# Patient Record
Sex: FEMALE | Race: WHITE | Employment: OTHER | URBAN - METROPOLITAN AREA
[De-identification: names, ages, dates, MRNs, and addresses within clinical notes are randomized per-mention and may not be internally consistent; named-entity substitution may affect disease eponyms.]

---

## 2018-01-18 ENCOUNTER — OFFICE VISIT (OUTPATIENT)
Dept: ENDOCRINOLOGY | Age: 53
End: 2018-01-18

## 2018-01-18 VITALS
HEIGHT: 63 IN | RESPIRATION RATE: 16 BRPM | TEMPERATURE: 98.4 F | DIASTOLIC BLOOD PRESSURE: 87 MMHG | WEIGHT: 177.8 LBS | SYSTOLIC BLOOD PRESSURE: 129 MMHG | HEART RATE: 86 BPM | OXYGEN SATURATION: 98 % | BODY MASS INDEX: 31.5 KG/M2

## 2018-01-18 DIAGNOSIS — E04.1 THYROID NODULE: Primary | ICD-10-CM

## 2018-01-18 DIAGNOSIS — E55.9 VITAMIN D DEFICIENCY: ICD-10-CM

## 2018-01-18 DIAGNOSIS — E03.9 ACQUIRED HYPOTHYROIDISM: ICD-10-CM

## 2018-01-18 DIAGNOSIS — E04.1 THYROID NODULE: ICD-10-CM

## 2018-01-18 LAB
A/G RATIO: 2.3 (ref 1.1–2.2)
ALBUMIN SERPL-MCNC: 4.9 G/DL (ref 3.4–5)
ALP BLD-CCNC: 58 U/L (ref 40–129)
ALT SERPL-CCNC: 16 U/L (ref 10–40)
ANION GAP SERPL CALCULATED.3IONS-SCNC: 13 MMOL/L (ref 3–16)
AST SERPL-CCNC: 16 U/L (ref 15–37)
BILIRUB SERPL-MCNC: 0.4 MG/DL (ref 0–1)
BUN BLDV-MCNC: 10 MG/DL (ref 7–20)
CALCIUM SERPL-MCNC: 9.9 MG/DL (ref 8.3–10.6)
CHLORIDE BLD-SCNC: 101 MMOL/L (ref 99–110)
CO2: 27 MMOL/L (ref 21–32)
CREAT SERPL-MCNC: 0.7 MG/DL (ref 0.6–1.1)
GFR AFRICAN AMERICAN: >60
GFR NON-AFRICAN AMERICAN: >60
GLOBULIN: 2.1 G/DL
GLUCOSE BLD-MCNC: 96 MG/DL (ref 70–99)
POTASSIUM SERPL-SCNC: 4.7 MMOL/L (ref 3.5–5.1)
SODIUM BLD-SCNC: 141 MMOL/L (ref 136–145)
T4 FREE: 1.3 NG/DL (ref 0.9–1.8)
TOTAL PROTEIN: 7 G/DL (ref 6.4–8.2)
TSH SERPL DL<=0.05 MIU/L-ACNC: 2.81 UIU/ML (ref 0.27–4.2)

## 2018-01-18 PROCEDURE — 99204 OFFICE O/P NEW MOD 45 MIN: CPT | Performed by: INTERNAL MEDICINE

## 2018-01-18 RX ORDER — DULOXETIN HYDROCHLORIDE 20 MG/1
20 CAPSULE, DELAYED RELEASE ORAL DAILY
COMMUNITY
End: 2018-04-19 | Stop reason: SDUPTHER

## 2018-01-18 RX ORDER — LEVOTHYROXINE SODIUM 0.05 MG/1
50 TABLET ORAL DAILY
COMMUNITY
End: 2018-04-19 | Stop reason: SDUPTHER

## 2018-01-18 RX ORDER — IBUPROFEN 200 MG
600 TABLET ORAL DAILY
COMMUNITY
End: 2019-05-22 | Stop reason: ALTCHOICE

## 2018-04-19 ENCOUNTER — TELEPHONE (OUTPATIENT)
Dept: ENDOCRINOLOGY | Age: 53
End: 2018-04-19

## 2018-04-19 RX ORDER — LEVOTHYROXINE SODIUM 0.05 MG/1
50 TABLET ORAL DAILY
Qty: 90 TABLET | Refills: 1 | Status: SHIPPED | OUTPATIENT
Start: 2018-04-19 | End: 2018-08-10 | Stop reason: SDUPTHER

## 2018-04-19 RX ORDER — DULOXETIN HYDROCHLORIDE 20 MG/1
20 CAPSULE, DELAYED RELEASE ORAL DAILY
Qty: 90 CAPSULE | Refills: 0 | Status: SHIPPED | OUTPATIENT
Start: 2018-04-19 | End: 2018-08-10 | Stop reason: SDUPTHER

## 2018-05-24 ENCOUNTER — OFFICE VISIT (OUTPATIENT)
Dept: ENDOCRINOLOGY | Age: 53
End: 2018-05-24

## 2018-05-24 VITALS
HEART RATE: 91 BPM | WEIGHT: 179.8 LBS | BODY MASS INDEX: 28.22 KG/M2 | OXYGEN SATURATION: 99 % | DIASTOLIC BLOOD PRESSURE: 83 MMHG | HEIGHT: 67 IN | RESPIRATION RATE: 18 BRPM | SYSTOLIC BLOOD PRESSURE: 120 MMHG

## 2018-05-24 DIAGNOSIS — E03.9 ACQUIRED HYPOTHYROIDISM: ICD-10-CM

## 2018-05-24 DIAGNOSIS — E55.9 VITAMIN D DEFICIENCY: ICD-10-CM

## 2018-05-24 DIAGNOSIS — E03.9 ACQUIRED HYPOTHYROIDISM: Primary | ICD-10-CM

## 2018-05-24 DIAGNOSIS — E04.1 THYROID NODULE: ICD-10-CM

## 2018-05-24 PROCEDURE — 99213 OFFICE O/P EST LOW 20 MIN: CPT | Performed by: INTERNAL MEDICINE

## 2018-05-24 ASSESSMENT — ENCOUNTER SYMPTOMS
BACK PAIN: 0
PHOTOPHOBIA: 0
HEMOPTYSIS: 0
DOUBLE VISION: 0
BLURRED VISION: 0
COUGH: 0
ORTHOPNEA: 0

## 2018-05-25 LAB
T4 FREE: 1.5 NG/DL (ref 0.9–1.8)
TSH SERPL DL<=0.05 MIU/L-ACNC: 3.34 UIU/ML (ref 0.27–4.2)

## 2018-08-10 RX ORDER — LEVOTHYROXINE SODIUM 0.05 MG/1
50 TABLET ORAL DAILY
Qty: 90 TABLET | Refills: 1 | Status: SHIPPED | OUTPATIENT
Start: 2018-08-10 | End: 2018-11-06 | Stop reason: SDUPTHER

## 2018-08-10 RX ORDER — DULOXETIN HYDROCHLORIDE 20 MG/1
20 CAPSULE, DELAYED RELEASE ORAL DAILY
Qty: 90 CAPSULE | Refills: 0 | Status: SHIPPED | OUTPATIENT
Start: 2018-08-10 | End: 2018-11-06 | Stop reason: SDUPTHER

## 2018-11-06 RX ORDER — DULOXETIN HYDROCHLORIDE 20 MG/1
20 CAPSULE, DELAYED RELEASE ORAL DAILY
Qty: 90 CAPSULE | Refills: 1 | Status: SHIPPED | OUTPATIENT
Start: 2018-11-06 | End: 2019-05-22

## 2018-11-06 RX ORDER — LEVOTHYROXINE SODIUM 0.05 MG/1
50 TABLET ORAL DAILY
Qty: 90 TABLET | Refills: 1 | Status: SHIPPED | OUTPATIENT
Start: 2018-11-06 | End: 2019-05-22

## 2018-11-29 ENCOUNTER — OFFICE VISIT (OUTPATIENT)
Dept: ENDOCRINOLOGY | Age: 53
End: 2018-11-29
Payer: COMMERCIAL

## 2018-11-29 VITALS
BODY MASS INDEX: 29.19 KG/M2 | WEIGHT: 186 LBS | HEIGHT: 67 IN | RESPIRATION RATE: 18 BRPM | DIASTOLIC BLOOD PRESSURE: 81 MMHG | OXYGEN SATURATION: 99 % | HEART RATE: 93 BPM | SYSTOLIC BLOOD PRESSURE: 125 MMHG

## 2018-11-29 DIAGNOSIS — E03.9 ACQUIRED HYPOTHYROIDISM: ICD-10-CM

## 2018-11-29 DIAGNOSIS — E04.1 THYROID NODULE: ICD-10-CM

## 2018-11-29 DIAGNOSIS — E55.9 VITAMIN D DEFICIENCY: ICD-10-CM

## 2018-11-29 DIAGNOSIS — E03.9 ACQUIRED HYPOTHYROIDISM: Primary | ICD-10-CM

## 2018-11-29 LAB
T4 FREE: 1.2 NG/DL (ref 0.9–1.8)
TSH SERPL DL<=0.05 MIU/L-ACNC: 1.85 UIU/ML (ref 0.27–4.2)
VITAMIN D 25-HYDROXY: 68.7 NG/ML

## 2018-11-29 PROCEDURE — 99214 OFFICE O/P EST MOD 30 MIN: CPT | Performed by: INTERNAL MEDICINE

## 2018-11-29 ASSESSMENT — ENCOUNTER SYMPTOMS
COUGH: 0
PHOTOPHOBIA: 0
HEMOPTYSIS: 0
DOUBLE VISION: 0
ORTHOPNEA: 0
BLURRED VISION: 0
BACK PAIN: 0

## 2018-11-29 NOTE — PROGRESS NOTES
She is oriented to person, place, and time. She appears well-developed. No distress. HENT:   Mouth/Throat: Oropharynx is clear and moist.   Eyes: EOM are normal.   Neck: No thyromegaly present. Cardiovascular: Normal rate and normal heart sounds. Pulmonary/Chest: Effort normal. No respiratory distress. She has no wheezes. Abdominal: Soft. Bowel sounds are normal. There is no tenderness. Musculoskeletal: She exhibits no edema. Neurological: She is alert and oriented to person, place, and time. Skin: Skin is warm and dry. She is not diaphoretic. Psychiatric: Her behavior is normal. Thought content normal.     EXAM: US-THYROID/NECK/HEAD 11/17    INDICATION: Nontoxic single thyroid nodule    COMPARISON: September 16, 2016    TECHNIQUE: Multiplanar grayscale analysis of the thyroid was performed. FINDINGS:    The right lobe measures 4.8 x 1.1 x 1.9 cm. The left lobe measures 4.1 x 0.9 x 1.5 cm. The isthmus measures 4 mm in anteroposterior dimension. The thyroid gland appears normal in size, contour, and echogenicity  bilaterally. There are unchanged tiny 3 mm nodules within both thyroid  Lobes. IMPRESSION:    Stable tiny thyroid nodules almost certainly benign. No further  follow-up is indicated. Assessment/Plan    1. Hypothyroidism    This 48 yrs old female has hypothyroidism. She is currently on levothyroxine 50 mcg daily. Levels were normal in 01/18 and 05/18  She has gained weight. Will repeat her labs. 2. Thyroid nodules. US in 11/17 showed small 3 mm nodules in both lobes. No change since initial US in 03/16    No further follow-up US recommended by radiologist given small size and benign appearance of the nodules. Normal thyroid on exam    Will continue to monitor with serial exams       3. Vitamin D def. On replacement. Vitamin D normal in 11/17. Results via my chart.

## 2019-05-22 ENCOUNTER — OFFICE VISIT (OUTPATIENT)
Dept: FAMILY MEDICINE CLINIC | Age: 54
End: 2019-05-22
Payer: COMMERCIAL

## 2019-05-22 VITALS
OXYGEN SATURATION: 96 % | SYSTOLIC BLOOD PRESSURE: 126 MMHG | DIASTOLIC BLOOD PRESSURE: 82 MMHG | BODY MASS INDEX: 28.61 KG/M2 | HEART RATE: 90 BPM | WEIGHT: 178 LBS | HEIGHT: 66 IN

## 2019-05-22 DIAGNOSIS — Z00.00 HEALTHCARE MAINTENANCE: ICD-10-CM

## 2019-05-22 DIAGNOSIS — K21.9 GASTROESOPHAGEAL REFLUX DISEASE, ESOPHAGITIS PRESENCE NOT SPECIFIED: ICD-10-CM

## 2019-05-22 DIAGNOSIS — R19.5 POSITIVE COLORECTAL CANCER SCREENING USING COLOGUARD TEST: ICD-10-CM

## 2019-05-22 DIAGNOSIS — E04.1 THYROID NODULE: ICD-10-CM

## 2019-05-22 DIAGNOSIS — R19.7 DIARRHEA, UNSPECIFIED TYPE: ICD-10-CM

## 2019-05-22 DIAGNOSIS — J30.89 NON-SEASONAL ALLERGIC RHINITIS, UNSPECIFIED TRIGGER: ICD-10-CM

## 2019-05-22 DIAGNOSIS — E03.9 ACQUIRED HYPOTHYROIDISM: ICD-10-CM

## 2019-05-22 DIAGNOSIS — Z76.89 ENCOUNTER TO ESTABLISH CARE: ICD-10-CM

## 2019-05-22 DIAGNOSIS — Z76.89 ENCOUNTER TO ESTABLISH CARE: Primary | ICD-10-CM

## 2019-05-22 LAB
A/G RATIO: 2 (ref 1.1–2.2)
ALBUMIN SERPL-MCNC: 5.1 G/DL (ref 3.4–5)
ALP BLD-CCNC: 58 U/L (ref 40–129)
ALT SERPL-CCNC: 14 U/L (ref 10–40)
ANION GAP SERPL CALCULATED.3IONS-SCNC: 20 MMOL/L (ref 3–16)
AST SERPL-CCNC: 12 U/L (ref 15–37)
BASOPHILS ABSOLUTE: 0.1 K/UL (ref 0–0.2)
BASOPHILS RELATIVE PERCENT: 0.7 %
BILIRUB SERPL-MCNC: <0.2 MG/DL (ref 0–1)
BUN BLDV-MCNC: 9 MG/DL (ref 7–20)
CALCIUM SERPL-MCNC: 10.5 MG/DL (ref 8.3–10.6)
CHLORIDE BLD-SCNC: 107 MMOL/L (ref 99–110)
CHOLESTEROL, TOTAL: 187 MG/DL (ref 0–199)
CO2: 20 MMOL/L (ref 21–32)
CREAT SERPL-MCNC: 0.8 MG/DL (ref 0.6–1.1)
EOSINOPHILS ABSOLUTE: 0.1 K/UL (ref 0–0.6)
EOSINOPHILS RELATIVE PERCENT: 0.9 %
GFR AFRICAN AMERICAN: >60
GFR NON-AFRICAN AMERICAN: >60
GLOBULIN: 2.6 G/DL
GLUCOSE BLD-MCNC: 114 MG/DL (ref 70–99)
HCT VFR BLD CALC: 42.9 % (ref 36–48)
HDLC SERPL-MCNC: 52 MG/DL (ref 40–60)
HEMOGLOBIN: 14.3 G/DL (ref 12–16)
LDL CHOLESTEROL CALCULATED: 97 MG/DL
LYMPHOCYTES ABSOLUTE: 1.7 K/UL (ref 1–5.1)
LYMPHOCYTES RELATIVE PERCENT: 18.9 %
MCH RBC QN AUTO: 30.7 PG (ref 26–34)
MCHC RBC AUTO-ENTMCNC: 33.3 G/DL (ref 31–36)
MCV RBC AUTO: 92.1 FL (ref 80–100)
MONOCYTES ABSOLUTE: 0.4 K/UL (ref 0–1.3)
MONOCYTES RELATIVE PERCENT: 4.8 %
NEUTROPHILS ABSOLUTE: 6.9 K/UL (ref 1.7–7.7)
NEUTROPHILS RELATIVE PERCENT: 74.7 %
PDW BLD-RTO: 13.3 % (ref 12.4–15.4)
PLATELET # BLD: 396 K/UL (ref 135–450)
PMV BLD AUTO: 7.5 FL (ref 5–10.5)
POTASSIUM SERPL-SCNC: 4.4 MMOL/L (ref 3.5–5.1)
RBC # BLD: 4.66 M/UL (ref 4–5.2)
SODIUM BLD-SCNC: 147 MMOL/L (ref 136–145)
T4 FREE: 1.3 NG/DL (ref 0.9–1.8)
TOTAL PROTEIN: 7.7 G/DL (ref 6.4–8.2)
TRIGL SERPL-MCNC: 189 MG/DL (ref 0–150)
TSH REFLEX: 4.55 UIU/ML (ref 0.27–4.2)
VITAMIN D 25-HYDROXY: 50.6 NG/ML
VLDLC SERPL CALC-MCNC: 38 MG/DL
WBC # BLD: 9.2 K/UL (ref 4–11)

## 2019-05-22 PROCEDURE — 99204 OFFICE O/P NEW MOD 45 MIN: CPT | Performed by: FAMILY MEDICINE

## 2019-05-22 ASSESSMENT — ENCOUNTER SYMPTOMS
BACK PAIN: 0
ABDOMINAL PAIN: 0
COUGH: 0
VOMITING: 0
NAUSEA: 0
CONSTIPATION: 0
BLOOD IN STOOL: 0
TROUBLE SWALLOWING: 0
COLOR CHANGE: 0
DIARRHEA: 1
SINUS PRESSURE: 1
SHORTNESS OF BREATH: 0

## 2019-05-22 NOTE — PROGRESS NOTES
Riley Mata  YOB: 1965    Date of Service:  5/22/2019    Chief Complaint:   Riley Mata is a 48 y.o. female who presents for new patient physical examination and to discuss her medications and thyroid problems. HPI:      Stopped taking her medications altogether last month. Was tired of being tired.      LMP: recently   Every 29 days  Having menopausal sxs    Was on Cymbalta due to multiple close family losses in 3 years  Was fake laughing and knew she wasn't in a good place  Stopped cold turkey while her  was out of town  Felt better after day 3    PPI messed her stomach up   Stopped her PPI after 1-2 days  Is drinking Baking soda water and feels better     Has Pop every once in a while  No coffee  Alcohol intermittently     Still having diarrhea  Cut back on junk and eating more fiber  No blood     No hemoptysis, hematemesis, melena, untinentional WL, dysphagia    Mammogram with [address]  Normal   Cologuard last year, +     Dr. Damián Tapia, Gyn  Saw on the 20th   Stopped her medications then     No problems with falling or staying asleep   Massage every 2 weeks   Working with a chiropractor, took an X-ray, stated her C1/2 were out of wack     Honey locust and honey suckle  Allergic rhinitis is worse   [employer] Readings from Last 3 Encounters:   05/22/19 178 lb (80.7 kg)   11/29/18 186 lb (84.4 kg)   05/24/18 179 lb 12.8 oz (81.6 kg)     BP Readings from Last 3 Encounters:   05/22/19 126/82   11/29/18 125/81   05/24/18 120/83       Patient Active Problem List   Diagnosis    PMDD (premenstrual dysphoric disorder)    Non-seasonal allergic rhinitis    Thoracic sprain and strain    Thyroid nodule    Acquired hypothyroidism    Positive colorectal cancer screening using Cologuard test    Diarrhea    Gastroesophageal reflux disease       Health Maintenance   Topic Date Due    HIV screen  10/23/1982    DTaP/Tdap/Td vaccine (1 - Tdap) 10/23/1986    Breast cancer screen  10/23/2017    Shingles Vaccine (1 of 2) 10/23/2017    Colon cancer screen colonoscopy  10/23/2017    Flu vaccine (Season Ended) 2019    TSH testing  2019    Cervical cancer screen  03/15/2021    Lipid screen  2021    Pneumococcal 0-64 years Vaccine  Aged Out         There is no immunization history on file for this patient. Allergies   Allergen Reactions    Morphine     Tetracyclines & Related      No current outpatient medications on file. No current facility-administered medications for this visit. No past medical history on file. Past Surgical History:   Procedure Laterality Date    APPENDECTOMY      WISDOM TOOTH EXTRACTION       Family History   Problem Relation Age of Onset    Diabetes Maternal Grandmother     Diabetes Other         great aunts and uncles    Esophageal Cancer Maternal Grandfather     Lupus Other         maternal great aunt   [facility] COPD Mother     Alcohol Abuse Mother         recovering    Other Mother         Mac Lung dz     Social History     Socioeconomic History    Marital status:      Spouse name: Not on file    Number of children: Not on file    Years of education: Not on file    Highest education level: Not on file   Occupational History    Not on file   Social Needs    Financial resource strain: Not on file    Food insecurity:     Worry: Not on file     Inability: Not on file    Transportation needs:     Medical: Not on file     Non-medical: Not on file   Tobacco Use    Smoking status: Former Smoker     Years: 20.00     Last attempt to quit: 2002     Years since quittin.3    Smokeless tobacco: Never Used   Substance and Sexual Activity    Alcohol use:  Yes     Alcohol/week: 0.0 oz     Comment: 1-2 daily    Drug use: Not on file    Sexual activity: Not on file   Lifestyle    Physical activity:     Days per week: Not on file     Minutes per session: Not on file    Stress: Not on file Relationships    Social connections:     Talks on phone: Not on file     Gets together: Not on file     Attends Scientologist service: Not on file     Active member of club or organization: Not on file     Attends meetings of clubs or organizations: Not on file     Relationship status: Not on file    Intimate partner violence:     Fear of current or ex partner: Not on file     Emotionally abused: Not on file     Physically abused: Not on file     Forced sexual activity: Not on file   Other Topics Concern    Not on file   Social History Narrative    Not on file       Review of Systems:  Review of Systems   Constitutional: Positive for activity change and fatigue. Negative for appetite change, chills, diaphoresis, fever and unexpected weight change. HENT: Positive for congestion, sinus pressure and sneezing. Negative for hearing loss and trouble swallowing. Eyes: Negative for visual disturbance. Respiratory: Negative for cough and shortness of breath. Cardiovascular: Negative for chest pain, palpitations and leg swelling. Gastrointestinal: Positive for diarrhea. Negative for abdominal pain, blood in stool, constipation, nausea and vomiting. Genitourinary: Negative for decreased urine volume, difficulty urinating, dysuria, flank pain, hematuria and urgency. Musculoskeletal: Positive for arthralgias. Negative for back pain. Skin: Negative for color change and rash. Neurological: Negative for dizziness, weakness, light-headedness, numbness and headaches. Psychiatric/Behavioral: Negative for dysphoric mood and sleep disturbance. The patient is not nervous/anxious. Physical Exam:   Vitals:    05/22/19 0804   BP: 126/82   Site: Left Upper Arm   Position: Sitting   Cuff Size: Medium Adult   Pulse: 90   SpO2: 96%   Weight: 178 lb (80.7 kg)   Height: 5' 6\" (1.676 m)     Body mass index is 28.73 kg/m². Physical Exam   Constitutional: She appears well-developed and well-nourished. No distress.

## 2019-05-22 NOTE — PATIENT INSTRUCTIONS
antioxidants. New research shows that fasting helps lower risk of breast cancer, diabetes, inflammation, cardiovascular disease, Alzheimers, and increases longevity. We don't have a lot of information on how much to fast, but even overnight fasts of 13 hours makes a difference. Consider skipping snacks after dinner. Exercise 1 hour a day at least 5 days a week. If you do not currently exercise, start slow by maybe walking 5 minutes out, 5 minutes back. Increase the amount of time you exercise every day by 2 - 5 minutes, as tolerated. Your goal should be to get to 1/2 - 1 hour a day. Exercise will help you control metabolic diseases, maintain independence, and reduce your risk for dementia. Weight training and resistance exercises have been shown to help preserve muscle mass and strength. It is recommended that these be done twice a week. Balance is important to prevent falls. An easy way to improve this is to stand on one leg at a time while you brush your teeth. Gently stretch your joints to maintain flexibilty. And maintain good posture to protect your spine.

## 2019-05-23 LAB
ESTIMATED AVERAGE GLUCOSE: 125.5 MG/DL
HBA1C MFR BLD: 6 %

## 2019-05-24 ENCOUNTER — INITIAL CONSULT (OUTPATIENT)
Dept: GASTROENTEROLOGY | Age: 54
End: 2019-05-24
Payer: COMMERCIAL

## 2019-05-24 VITALS
SYSTOLIC BLOOD PRESSURE: 128 MMHG | WEIGHT: 180 LBS | HEIGHT: 66 IN | DIASTOLIC BLOOD PRESSURE: 74 MMHG | BODY MASS INDEX: 28.93 KG/M2

## 2019-05-24 DIAGNOSIS — R19.5 POSITIVE COLORECTAL CANCER SCREENING USING COLOGUARD TEST: Primary | ICD-10-CM

## 2019-05-24 DIAGNOSIS — R19.7 DIARRHEA, UNSPECIFIED TYPE: ICD-10-CM

## 2019-05-24 LAB — MISCELLANEOUS LAB TEST ORDER: NORMAL

## 2019-05-24 PROCEDURE — 99203 OFFICE O/P NEW LOW 30 MIN: CPT | Performed by: INTERNAL MEDICINE

## 2019-05-24 RX ORDER — POLYETHYLENE GLYCOL 3350 17 G/17G
238 POWDER ORAL ONCE
Qty: 238 G | Refills: 0 | Status: SHIPPED | OUTPATIENT
Start: 2019-05-24 | End: 2019-05-24

## 2019-05-24 NOTE — PROGRESS NOTES
[address]  [address] [facility]  Phone: [phone]   [phone]    CHIEF COMPLAINT     Chief Complaint   Patient presents with    New Patient     Postive colorgaurd r/b Dr. Venus Velarde         HPI     Deepali Felton is a 48 y.o. female who presents for diarrhea, positive Cologuard test. Patient had a positive Cologuard test for colon cancer screening done 3/26/19. She has never had a colonoscopy before. Denies rectal bleeding. She has diarrhea for about 3 months but only has 2 loose daily stools. Her mother and her brother both had diverticulitis and needed surgery for it. No family history of colon cancer. She denies any abdominal pain currently. She had some issues with GERD in the past but none for sometime now. CBC and CMP normal 19. PAST MEDICAL HISTORY   History reviewed. No pertinent past medical history. FAMILY HISTORY     Family History   Problem Relation Age of Onset    Diabetes Maternal Grandmother     Diabetes Other         great aunts and uncles    Esophageal Cancer Maternal Grandfather     Lupus Other         maternal great aunt   [facility] COPD Mother     Alcohol Abuse Mother         recovering    Other Mother         Mac Lung dz     SOCIAL HISTORY     Social History     Socioeconomic History    Marital status:      Spouse name: Not on file    Number of children: Not on file    Years of education: Not on file    Highest education level: Not on file   Occupational History    Not on file   Social Needs    Financial resource strain: Not on file    Food insecurity:     Worry: Not on file     Inability: Not on file    Transportation needs:     Medical: Not on file     Non-medical: Not on file   Tobacco Use    Smoking status: Former Smoker     Years: 20.00     Last attempt to quit: 2002     Years since quittin.3    Smokeless tobacco: Never Used   Substance and Sexual Activity    Alcohol use:  Yes     Alcohol/week: 0.0 oz     Comment: 1-2 daily    Drug use: Never    Sexual activity: Not Currently   Lifestyle    Physical activity:     Days per week: Not on file     Minutes per session: Not on file    Stress: Not on file   Relationships    Social connections:     Talks on phone: Not on file     Gets together: Not on file     Attends Orthodox service: Not on file     Active member of club or organization: Not on file     Attends meetings of clubs or organizations: Not on file     Relationship status: Not on file    Intimate partner violence:     Fear of current or ex partner: Not on file     Emotionally abused: Not on file     Physically abused: Not on file     Forced sexual activity: Not on file   Other Topics Concern    Not on file   Social History Narrative    Not on file     SURGICAL HISTORY     Past Surgical History:   Procedure Laterality Date    APPENDECTOMY      WISDOM TOOTH EXTRACTION       [address]   (This list may include medications prescribed during this encounter as epic can not insert only the list prior to this encounter.)     ALLERGIES     Allergies   Allergen Reactions    Morphine     Tetracyclines & Related      IMMUNIZATIONS     There is no immunization history on file for this patient. REVIEW OF SYSTEMS     Constitutional: denies fever and unexpected weight change. HENT: Negative for ear pain, hearing loss and nosebleeds. Eyes: Negative for pain and visual disturbance. Respiratory: Negative for cough, shortness of breath and wheezing. Cardiovascular: Negative for chest pain, palpitations and leg swelling. Gastrointestinal: see HPI for details. Endocrine: Negative for polydipsia, polyphagia and polyuria. Genitourinary: Negative for difficulty urinating, dysuria, hematuria and urgency. Musculoskeletal: Positive for arthralgias and back pain. Skin: Negative for pallor and rash. Allergic/Immunologic: Negative for environmental allergies and immunocompromised state. Neurological: Negative for seizures, syncope. Hematological: Negative for adenopathy. Does not bruise/bleed easily. Psychiatric/Behavioral: Negative for agitation, confusion, hallucinations. PHYSICAL EXAM   /74 (Site: Left Upper Arm, Position: Sitting, Cuff Size: Small Adult)   Ht 5' 6\" (1.676 m)   Wt 180 lb (81.6 kg)   LMP 05/08/2019   BMI 29.05 kg/m²   Wt Readings from Last 3 Encounters:   05/24/19 180 lb (81.6 kg)   05/22/19 178 lb (80.7 kg)   11/29/18 186 lb (84.4 kg)     Constitutional: AAO3, No acute distress  HEENT: no pallor or icterus. Neck: supple, no adenopathy  Cardiovascular: Normal heart rate, Normal rhythm, No murmurs,. RS: Normal breath sounds, No wheezing,   Abdomen: soft, non tender, not distended, BS+. No hepatosplenomegaly. Extremities:  No edema. Neuro: AAO3, non focal.      FINAL IMPRESSION     Positive Cologuard test - mild diarrhea (2 loose bowel movements), no rectal bleeding. A colonoscopy is indicated to rule out large colon polyps or colon cancer. Procedure discussed with patient in detail including risks and benefits. Anesthesia risks, risk of perforation, bleeding discussed with the patient. Diarrhea is mild and could be functional. Trial of fiber supplementation daily. Reduce lactose containing foods.

## 2019-05-24 NOTE — LETTER
COLONOSCOPY PREP INSTRUCTIONS  MlRALAX SPLIT DOSE   [facility] PHYSICIAN ENDOSCOPY    Your colonoscopy is scheduled on: _6/4/19 @ 11:30am_   __Paulo/Marky_  Arrival Time: __10:30am_   DO NOT EAT OR DRINK (INCLUDING WATER) AFTER: _6:30am after drinking second dose of prep Milagros Coronel Name_[facility] [phone]  [facility]- [phone]    Keep these papers together; REVIEW ALL OF THEM AT LEAST 7 DAYS BEFORE THE PROCEDURE. Please complete all paperwork; including a current list of your medications, to avoid delays in the admission process. The following instructions must be followed in order to ensure your procedure has optimal outcomes. - KEEP YOUR APPOINTMENT. If for any reason, you are unable to keep your appointment, please notify us within 72 hours before your procedure. - You MUST have a responsible adult to drive you, who MUST remain at our facility the ENTIRE time. If not the procedure will be cancelled. You may go by taxi ONLY if you have a responsible adult with you. You may experience light headedness, dizziness etc., therefore you should have a responsible adult remain with you until the morning after your procedure. - Bring your insurance card and 's license. Call your insurance carrier to verify your benefits, and confirm that our facility is in your network, prior to the procedure date to ensure coverage. The facility name is listed as [facility] or [facility] [address]  and the tax ID# is [id number].  - Due to the safety and privacy of our patients, only one visitor is allowed in the recovery area after the procedure. The center will not be responsible for lost valuables so please leave them at home. - Try to avoid seeds (strawberries, oren, and rye) for one week prior to your procedure.   - If you have questions after beginning the prep, call between 8:30 am & 4:30pm you will speak to a nurse or medical assistant, after 4:30pm you will reach the physician on call. - Hydration (body fluid) is the most important aspect of an effective and safe prep. If you are not drinking enough fluid you may experience cramping, nausea and dizziness. - Common side effects of the prep are nausea, bloating and shaking chills. If any of these occur, take a break from the prep (30 minutes to 1 hour) and then restart. If unable to complete after that notify the Physician as your procedure may need to be cancelled. Nausea medication or an alternative prep is sometimes called in.  - Do not leave home after starting the prep. Frequent, liquid stools may begin as soon as 15 minutes after the first bottle, although it could take up to 4 hours or longer for your first bowel movement. - Even if your stools are clear and watery in appearance, TAKE ALL OF THE PREP.  - Using baby wipes and nonprescription ointments, such as Desitin, will help with the irritation caused by frequent loose stools. - Due to unforeseen schedule changes, you may be asked to move your appointment time to an earlier/later time slot. To insure that your prep gives you the best results for your Colonoscopy, Please follow all directions closely. 3-5 days prior to your procedure:  1. Begin a low-fiber diet (no corn, dried beans, tomatoes, nuts, seeds, lettuce). 2. No bran or bulking agents. 3. Stop taking your multivitamin or iron supplements.   4. If you currently take Aggrenox, Dipyridamole, Persantine, Fondaparinux sodium (Arixtra), Dalteparin sodium Klarissa Michaels), Warfarin (Coumadin), Clopidogrel (Plavix), Prasugrel (Effient), Enoxaparin, Lovenox, or Heparin, Cilostazol (Pletal), Rivoroxaban (Xarelto), Desirudin (Iprivask), Cyclopentyltrazolopyrimide (Ticagrelor or Brilinta), Cangrelor (Michelle Six), Dabigatran (Pradaxa), Apixaban (Eliquis), Edoxaban (Savaysa)you should have received instructions regarding if and when to discontinue the medication. If you have not, or do not clearly understand the instructions, please call the office for clarification (number listed above). 5. Drink plenty of fluid. 1 day prior to your procedure:  1. Do not eat any SOLID FOOD, beginning with breakfast drink clear liquids only, which includes: Chicken or Beef Broth, Coffee/tea (without milk or creamer) Gatorade/PowerAde (no red or purple), JeIl-O (no red or purple), All Soda (even dark cola), Sorbet/Popsicles (no red or purple), Water If you take Diabetic medications (insulin/oral medication)-reduce the amount by one half on the morning of prep. You may resume the meds once you begin eating again. You must drink 8oz of liquids every hour to avoid dehydration. 2. If you take Diabetic medications (insulin/oral medication) - reduce the amount by one half on morning of prep. You may resume the meds once you begin eating again. 3. Bowel Cleansing Prep  ** 3:00pm Take 4 dulcolax (bisacodyl) tablets with a full glass of water  ** 5:00pm Mix one bottle of Miralax (polyethlene glycol) (238/255gm) in one bottle of Gatorade (64oz). Shake until dissolved. Drink 8 oz every 15 minutes until you have finished half of the solution. MORNING OF PROCEDURE  1. __6:30AM__ (5 hours prior to the procedure) Drink the remaining portion of the solution 8 oz. every 15 minutes until completely finished, followed by 8 oz of any of the approved liquids. 2. Take your Blood pressure, Heart and Seizure medication the morning of the procedure with sips of water. 3. Bring inhalers with you. 4. Do not take your Diabetic medication the morning of procedure. 5. You must have a  stay with you during the entire procedure. Dear Patient,      Brandie Molina will receive a call from the [facility] pre-registration department prior to your GI procedure.  This will help streamline your check-in process on the day of service. During this call a skilled associate will review your demographic and insurance benefits information. The associate will answer any question pertaining to your insurance contract, such as deductible/co-insurance/ co-pay or any other financial concerns. They may offer an amount that you could pay on the day of surgery but this is not a requirement. Thank you for allowing [facility] to be part of your [address]  [address], [address] [facility] [address] Is located at the intersection of [address] [place] [address] and [facility], next to [facility]. From [address]: Exit at [address]. Travel on [address] [facility] past [facility] and turn right onto [facility].  Then turn left into the main driveway facing the [facility], bare to the right of the driveway and look for the building to the right of the hospital.    If you need further directions, pleae call our main number at [phone]

## 2019-05-30 ENCOUNTER — OFFICE VISIT (OUTPATIENT)
Dept: ENDOCRINOLOGY | Age: 54
End: 2019-05-30
Payer: COMMERCIAL

## 2019-05-30 VITALS
DIASTOLIC BLOOD PRESSURE: 76 MMHG | BODY MASS INDEX: 27.88 KG/M2 | OXYGEN SATURATION: 100 % | WEIGHT: 177.6 LBS | HEART RATE: 94 BPM | SYSTOLIC BLOOD PRESSURE: 108 MMHG | HEIGHT: 67 IN

## 2019-05-30 DIAGNOSIS — E03.8 SUBCLINICAL HYPOTHYROIDISM: Primary | ICD-10-CM

## 2019-05-30 PROCEDURE — 99213 OFFICE O/P EST LOW 20 MIN: CPT | Performed by: INTERNAL MEDICINE

## 2019-05-30 ASSESSMENT — ENCOUNTER SYMPTOMS
COUGH: 0
DOUBLE VISION: 0
HEMOPTYSIS: 0
ORTHOPNEA: 0
BLURRED VISION: 0
PHOTOPHOBIA: 0
BACK PAIN: 0

## 2019-05-30 NOTE — PROGRESS NOTES
Endocrinology  Rosio Pond M.D. Phone: [phone]   FAX: [phone]       Flora Dey   YOB: 1965    Date of Visit:  2019    Allergies   Allergen Reactions    Morphine     Tetracyclines & Related      Outpatient Medications Marked as Taking for the 19 encounter (Office Visit) with Alina Santoyo MD   Medication Sig Dispense Refill    bisacodyl (BISACODYL) 5 MG EC tablet Take 4 tablets of Bisacodyl for colonoscopy prep as directed. 4 tablet 0         Vitals:    19 1116   BP: 108/76   Site: Right Upper Arm   Position: Sitting   Cuff Size: Large Adult   Pulse: 94   SpO2: 100%   Weight: 177 lb 9.6 oz (80.6 kg)   Height: 5' 7\" (1.702 m)     Body mass index is 27.82 kg/m². Wt Readings from Last 3 Encounters:   19 177 lb 9.6 oz (80.6 kg)   19 180 lb (81.6 kg)   19 178 lb (80.7 kg)     BP Readings from Last 3 Encounters:   19 108/76   19 128/74   19 126/82        History reviewed. No pertinent past medical history. Past Surgical History:   Procedure Laterality Date    APPENDECTOMY      WISDOM TOOTH EXTRACTION       Family History   Problem Relation Age of Onset    Diabetes Maternal Grandmother     Diabetes Other         great aunts and uncles    Esophageal Cancer Maternal Grandfather     Lupus Other         maternal great aunt    COPD Mother     Alcohol Abuse Mother         recovering    Other Mother         Mac Lung dz     Social History     Tobacco Use   Smoking Status Former Smoker    Years: 20.00    Last attempt to quit: 2002    Years since quittin.3   Smokeless Tobacco Never Used      Social History     Substance and Sexual Activity   Alcohol Use Yes    Alcohol/week: 0.0 oz    Comment: 1-2 daily       HPI      Flora Dey is a 48 y.o. female who is here for a follow-up for management of thyroid disease    Self-referred.        Patient has a PMH of depression, hypothyroidism, vitamin D tenderness. Musculoskeletal: She exhibits no edema. Neurological: She is alert and oriented to person, place, and time. Skin: Skin is warm and dry. She is not diaphoretic. Psychiatric: Her behavior is normal. Thought content normal.     EXAM: US-THYROID/NECK/HEAD 11/17    INDICATION: Nontoxic single thyroid nodule    COMPARISON: September 16, 2016    TECHNIQUE: Multiplanar grayscale analysis of the thyroid was performed. FINDINGS:    The right lobe measures 4.8 x 1.1 x 1.9 cm. The left lobe measures 4.1 x 0.9 x 1.5 cm. The isthmus measures 4 mm in anteroposterior dimension. The thyroid gland appears normal in size, contour, and echogenicity  bilaterally. There are unchanged tiny 3 mm nodules within both thyroid  Lobes. IMPRESSION:    Stable tiny thyroid nodules almost certainly benign. No further  follow-up is indicated. Assessment/Plan    1. Subclinical Hypothyroidism    This 48 yrs old female has subclinical hypothyroidism with normal Free T4 and slightly high TSH    She was on levothyroxine 50 mcg daily from 2016-02/19    She is clinically euthyroid    Will recommend repeating labs in 6 months. 2. Thyroid nodules. US in 11/17 showed small 3 mm nodules in both lobes. No change since initial US in 03/16    No further follow-up US recommended by radiologist given small size and benign appearance of the nodules. Normal thyroid on exam    Will continue to monitor with serial exams       3. Vitamin D def. Off replacement. Vitamin D normal in 11/17, 11/18    4. Pre-diabetes Discussed life style changes. Results via my chart.

## 2019-06-04 ENCOUNTER — HOSPITAL ENCOUNTER (OUTPATIENT)
Age: 54
Setting detail: OUTPATIENT SURGERY
Discharge: HOME | End: 2019-06-04
Attending: INTERNAL MEDICINE | Admitting: INTERNAL MEDICINE
Payer: COMMERCIAL

## 2019-06-04 VITALS
RESPIRATION RATE: 20 BRPM | BODY MASS INDEX: 27.15 KG/M2 | WEIGHT: 173 LBS | DIASTOLIC BLOOD PRESSURE: 78 MMHG | HEART RATE: 84 BPM | TEMPERATURE: 98.3 F | SYSTOLIC BLOOD PRESSURE: 117 MMHG | OXYGEN SATURATION: 98 % | HEIGHT: 67 IN

## 2019-06-04 PROBLEM — K57.30 DIVERTICULOSIS OF LARGE INTESTINE WITHOUT HEMORRHAGE: Status: ACTIVE | Noted: 2019-06-04

## 2019-06-04 PROBLEM — K63.5 POLYP, SIGMOID COLON: Status: ACTIVE | Noted: 2019-06-04

## 2019-06-04 LAB — PREGNANCY, URINE: NEGATIVE

## 2019-06-04 PROCEDURE — 88305 TISSUE EXAM BY PATHOLOGIST: CPT

## 2019-06-04 PROCEDURE — 3609009900 HC COLONOSCOPY W/CONTROL BLEEDING ANY METHOD: Performed by: INTERNAL MEDICINE

## 2019-06-04 PROCEDURE — 6370000000 HC RX 637 (ALT 250 FOR IP): Performed by: INTERNAL MEDICINE

## 2019-06-04 PROCEDURE — 3609010400 HC COLONOSCOPY POLYPECTOMY HOT BIOPSY: Performed by: INTERNAL MEDICINE

## 2019-06-04 PROCEDURE — 2580000003 HC RX 258: Performed by: INTERNAL MEDICINE

## 2019-06-04 PROCEDURE — 45385 COLONOSCOPY W/LESION REMOVAL: CPT | Performed by: INTERNAL MEDICINE

## 2019-06-04 PROCEDURE — 7100000010 HC PHASE II RECOVERY - FIRST 15 MIN: Performed by: INTERNAL MEDICINE

## 2019-06-04 PROCEDURE — 84703 CHORIONIC GONADOTROPIN ASSAY: CPT

## 2019-06-04 PROCEDURE — 99153 MOD SED SAME PHYS/QHP EA: CPT | Performed by: INTERNAL MEDICINE

## 2019-06-04 PROCEDURE — 7100000011 HC PHASE II RECOVERY - ADDTL 15 MIN: Performed by: INTERNAL MEDICINE

## 2019-06-04 PROCEDURE — 6360000002 HC RX W HCPCS: Performed by: INTERNAL MEDICINE

## 2019-06-04 PROCEDURE — 2709999900 HC NON-CHARGEABLE SUPPLY: Performed by: INTERNAL MEDICINE

## 2019-06-04 PROCEDURE — 45384 COLONOSCOPY W/LESION REMOVAL: CPT | Performed by: INTERNAL MEDICINE

## 2019-06-04 PROCEDURE — 99152 MOD SED SAME PHYS/QHP 5/>YRS: CPT | Performed by: INTERNAL MEDICINE

## 2019-06-04 RX ORDER — FENTANYL CITRATE 50 UG/ML
INJECTION, SOLUTION INTRAMUSCULAR; INTRAVENOUS PRN
Status: DISCONTINUED | OUTPATIENT
Start: 2019-06-04 | End: 2019-06-04 | Stop reason: ALTCHOICE

## 2019-06-04 RX ORDER — SODIUM CHLORIDE, SODIUM LACTATE, POTASSIUM CHLORIDE, CALCIUM CHLORIDE 600; 310; 30; 20 MG/100ML; MG/100ML; MG/100ML; MG/100ML
INJECTION, SOLUTION INTRAVENOUS ONCE
Status: COMPLETED | OUTPATIENT
Start: 2019-06-04 | End: 2019-06-04

## 2019-06-04 RX ORDER — SIMETHICONE 20 MG/.3ML
EMULSION ORAL PRN
Status: DISCONTINUED | OUTPATIENT
Start: 2019-06-04 | End: 2019-06-04 | Stop reason: ALTCHOICE

## 2019-06-04 RX ORDER — MIDAZOLAM HYDROCHLORIDE 5 MG/ML
INJECTION INTRAMUSCULAR; INTRAVENOUS PRN
Status: DISCONTINUED | OUTPATIENT
Start: 2019-06-04 | End: 2019-06-04 | Stop reason: ALTCHOICE

## 2019-06-04 RX ADMIN — SODIUM CHLORIDE, POTASSIUM CHLORIDE, SODIUM LACTATE AND CALCIUM CHLORIDE: 600; 310; 30; 20 INJECTION, SOLUTION INTRAVENOUS at 10:01

## 2019-06-04 ASSESSMENT — PAIN SCALES - GENERAL
PAINLEVEL_OUTOF10: 0

## 2019-06-04 ASSESSMENT — PAIN - FUNCTIONAL ASSESSMENT: PAIN_FUNCTIONAL_ASSESSMENT: 0-10

## 2019-06-04 NOTE — OP NOTE
[address] ,  [address] [facility]  Phone: [phone]   HJX:[phone]    Colonoscopy Procedure Note    Patient: Raine Castanon  : 1965    Procedure: Colonoscopy     Date:  2019     Endoscopist:  Clayton Polanco MD    Referring Physician:  Kong Packer MD    Preoperative Diagnosis:  POSITIVE COLORECTAL CANCER SCREENING,DIARRHEA    Postoperative Diagnosis:  See impression    Anesthesia: Anesthesia: Moderate Sedation  Sedation: Versed 5 mg IV, fentanyl 100 mcg IV  Start Time: 10:26  Stop Time: 10:46  Withdrawal time: 14 minutes  ASA Class: 2  Mallampati: II (soft palate, uvula, fauces visible)    Indications: This is a 48y.o. year old female who presents today with positive Cologuard test. A colonoscopy is indicated to rule out large colon polyps or colon cancer.     Procedure Details  Informed consent was obtained for the procedure, including sedation. Risks of perforation, hemorrhage, adverse drug reaction and aspiration were discussed. The patient was placed in the left lateral decubitus position. Based on the pre-procedure assessment, including review of the patient's medical history, medications, allergies, and review of systems, she had been deemed to be an appropriate candidate for conscious sedation; she was therefore sedated with the medications listed below. The patient was monitored continuously with ECG tracing, pulse oximetry, blood pressure monitoring, and direct observations. rectal examination was performed. The colonoscope was inserted into the rectum and advanced under direct vision to the cecum, which was identified by the ileocecal valve and appendiceal orifice. The quality of the colonic preparation was good. A careful inspection was made as the colonoscope was withdrawn, including a retroflexed view of the rectum; findings and interventions are described below.   Appropriate photodocumentation was obtained. Patient tolerated the procedure well. Findings: -There is mild diverticulosis in the sigmoid, descending colon. There was a 5 mm sessile ascending colon polyp removed with cold forceps. There was a 1.5 cm sessile distal sigmoid polyp removed with hot snare. One clip placed prophylactically at the site. - Anesthesia issues: no    Specimens: Was Obtained: bottle 1, sessile polyp, transverse colon, cold forceps  Bottle 2, sessile polyp, sigmoid colon, hot snare    Complications:   None    Estimated blood loss: minimal    Disposition:   PACU - hemodynamically stable. Impression:   -There is mild diverticulosis in the sigmoid, descending colon. There was a 5 mm sessile ascending colon polyp removed with cold forceps. There was a 1.5 cm sessile distal sigmoid polyp removed with hot snare. One clip placed prophylactically at the site. Recommendations:  -Await pathology. Repeat colonoscopy based on pathology. - High fiber diet for diverticulosis. - Avoid NSAIDs for 1 week.         Gregorio Lott 6/4/19 10:13 AM

## 2019-06-04 NOTE — H&P
Chief Complaint   Patient presents with    New Patient       Postive marie r/b Dr. Felipe Concepcion  [facility]      Roxy Canales is a 48 y.o. female who presents for diarrhea, positive Cologuard test. Patient had a positive Cologuard test for colon cancer screening done 3/26/19. She has never had a colonoscopy before. Denies rectal bleeding. She has diarrhea for about 3 months but only has 2 loose daily stools. Her mother and her brother both had diverticulitis and needed surgery for it. No family history of colon cancer. She denies any abdominal pain currently. She had some issues with GERD in the past but none for sometime now. CBC and CMP normal 19.     PAST MEDICAL HISTORY   Past Medical History   History reviewed. No pertinent past medical history. FAMILY HISTORY      Family History   Family History   Problem Relation Age of Onset    Diabetes Maternal Grandmother      Diabetes Other           great aunts and uncles    Esophageal Cancer Maternal Grandfather      Lupus Other           maternal great aunt    COPD Mother      Alcohol Abuse Mother           recovering    Other Mother           Mac Lung dz         SOCIAL HISTORY      Social History               Socioeconomic History    Marital status:        Spouse name: Not on file    Number of children: Not on file    Years of education: Not on file    Highest education level: Not on file   Occupational History    Not on file   Social Needs    Financial resource strain: Not on file    Food insecurity:       Worry: Not on file       Inability: Not on file    Transportation needs:       Medical: Not on file       Non-medical: Not on file   Tobacco Use    Smoking status: Former Smoker       Years: 20.00       Last attempt to quit: 2002       Years since quittin.3    Smokeless tobacco: Never Used   Substance and Sexual Activity    Alcohol use:  Yes       Alcohol/week: 0.0 oz       Comment: 1-2 daily    Drug use:

## 2019-06-28 ENCOUNTER — OFFICE VISIT (OUTPATIENT)
Dept: FAMILY MEDICINE CLINIC | Age: 54
End: 2019-06-28
Payer: COMMERCIAL

## 2019-06-28 VITALS
HEART RATE: 81 BPM | SYSTOLIC BLOOD PRESSURE: 126 MMHG | WEIGHT: 174 LBS | BODY MASS INDEX: 27.25 KG/M2 | OXYGEN SATURATION: 97 % | DIASTOLIC BLOOD PRESSURE: 82 MMHG

## 2019-06-28 DIAGNOSIS — Z00.00 ANNUAL PHYSICAL EXAM: Primary | ICD-10-CM

## 2019-06-28 PROCEDURE — 99396 PREV VISIT EST AGE 40-64: CPT | Performed by: FAMILY MEDICINE

## 2019-06-28 ASSESSMENT — ENCOUNTER SYMPTOMS
SHORTNESS OF BREATH: 0
DIARRHEA: 0
CONSTIPATION: 0
ABDOMINAL DISTENTION: 0
VOMITING: 0
BLOOD IN STOOL: 0
COUGH: 0
BACK PAIN: 0
NAUSEA: 0
ABDOMINAL PAIN: 0
COLOR CHANGE: 0
TROUBLE SWALLOWING: 0

## 2019-06-28 NOTE — PROGRESS NOTES
Deisi Pham  YOB: 1965    Date of Service:  6/28/2019    Chief Complaint:   Deisi Pham is a 48 y.o. female who presents for a preventative visit     HPI:    Follows with Endocrinology, las visit with Dr. Adri Martinez on 5/20/19    Patient's last menstrual period was 06/27/2019.   menstrual cycle: regular, 28 days  Sexual activity: has sex with male,     AbnormalSxs: no    Last PAP: 5/20/19 with Dr. Bob Guerra    Normal pap, neg HPV     Last Mammogram: yes - earlier this year, in April   With Lizzie Craig Hx of ovarian, breast, or uterine cancer: no  Self-breast exams: no    Previous Colonoscopy yes - recently due to + Cologuard  BRBR, unexplained WL, bloating, abd pain No  Family Hx of Colon Ca  no    Exercise: walks 7 time(s) per week  Diet: trying to stay away from junk food     Wt Readings from Last 3 Encounters:   06/28/19 174 lb (78.9 kg)   06/04/19 173 lb (78.5 kg)   05/30/19 177 lb 9.6 oz (80.6 kg)     BP Readings from Last 3 Encounters:   06/28/19 126/82   06/04/19 117/78   05/30/19 108/76       Patient Active Problem List   Diagnosis    PMDD (premenstrual dysphoric disorder)    Non-seasonal allergic rhinitis    Thoracic sprain and strain    Thyroid nodule    Acquired hypothyroidism    Positive colorectal cancer screening using Cologuard test    Diarrhea    Gastroesophageal reflux disease    Polyp, sigmoid colon    Diverticulosis of large intestine without hemorrhage       Health Maintenance   Topic Date Due    HIV screen  10/23/1982    DTaP/Tdap/Td vaccine (1 - Tdap) 10/23/1986    Breast cancer screen  10/23/2017    Shingles Vaccine (1 of 2) 10/23/2017    Flu vaccine (Season Ended) 09/01/2019    A1C test (Diabetic or Prediabetic)  05/22/2020    TSH testing  05/22/2020    Cervical cancer screen  03/15/2021    Lipid screen  05/22/2024    Colon cancer screen colonoscopy  06/04/2024    Pneumococcal 0-64 years Vaccine  Aged Out         There is no together: Not on file     Attends Jew service: Not on file     Active member of club or organization: Not on file     Attends meetings of clubs or organizations: Not on file     Relationship status: Not on file    Intimate partner violence:     Fear of current or ex partner: Not on file     Emotionally abused: Not on file     Physically abused: Not on file     Forced sexual activity: Not on file   Other Topics Concern    Not on file   Social History Narrative    Not on file       Review of Systems:  Review of Systems   Constitutional: Negative for activity change, appetite change, chills, diaphoresis, fatigue, fever and unexpected weight change. HENT: Negative for ear pain, hearing loss and trouble swallowing. Eyes: Negative for visual disturbance. Respiratory: Negative for cough and shortness of breath. Cardiovascular: Negative for chest pain, palpitations and leg swelling. Gastrointestinal: Negative for abdominal distention, abdominal pain, blood in stool, constipation, diarrhea, nausea and vomiting. Genitourinary: Negative for decreased urine volume, difficulty urinating, dyspareunia, dysuria, flank pain, frequency, genital sores, hematuria, menstrual problem, pelvic pain, urgency, vaginal bleeding, vaginal discharge and vaginal pain. Musculoskeletal: Negative for arthralgias and back pain. Skin: Negative for color change and rash. Allergic/Immunologic: Negative for immunocompromised state. Neurological: Negative for dizziness, weakness, light-headedness, numbness and headaches. Hematological: Negative for adenopathy. Psychiatric/Behavioral: Negative for dysphoric mood and sleep disturbance. The patient is not nervous/anxious. Physical Exam:   Vitals:    06/28/19 1000   BP: 126/82   Site: Left Upper Arm   Position: Sitting   Cuff Size: Medium Adult   Pulse: 81   SpO2: 97%   Weight: 174 lb (78.9 kg)     Body mass index is 27.25 kg/m².    Physical Exam   Constitutional: She mis-transcribed.       Return in about 1 year (around 6/28/2020) for annual physical.

## 2019-06-28 NOTE — PATIENT INSTRUCTIONS
Eat 5 - 6 servings of fruit per day. Locally grown fresh fruit has the most antioxidants. If they are not available, frozen fruit is the next best.     Eat more fresh vegetables, olive oil, and a handful of nuts (unsalted) every day. Make all your grains (breads, pasta, rice) 'whole grain' only to increase natural fiber in your diet     Fish has healthy omega-3 oils. Eating fish twice a week has been shown to improve health. If you take fish oil, take at least 1,000mg EPA + DHA a day (you must look at the food label on the back of the bottle and add up these omega-3s to know how much of this beneficial nutrient is contained in the product). There is more evidence that eating fish improves health more than taking fish oi supplements. Eating eggs has benefit if you eat the high omega-3 eggs. In these eggs, the yolks are good for you. At [facility], go to the [employer] food section and get the 660mg omega-3 eggs. These are really good for you. The chickens are fed fish, and the benefits of the fish are imparted into the egg yolk. If you have diabetes, you should probably avoid eggs. Current research shows that a pesco-vegetarian diet (eating a plant based diet with fish) is the best for improving longevity and reducing cardiovascular disease. Limit saturated fats, sugar and red meat. Avoid trans-fats and processed meat (e.g. Salami). Avoid fried foods, potato dishes and white (or enriched, processed) flour. Foods to avoid! Trans-fats - increases risk of heart disease, stroke, and development of diabetes     Processed meats (lunch meat, goetta, sausage, pepperoni, etc.. ) - increases risk of cancers     High fructose corn syrup (fructose, corn syrup) - increases risk of high blood pressure, obesity and diabetes     More information on healthful diets and recipes is available at www. choosemyplate.gov, or ww.mypyramid.gov     Enhance your foods with spices.  They are full of nutritional benefit and antioxidants. New research shows that fasting helps lower risk of breast cancer, diabetes, inflammation, cardiovascular disease, Alzheimers, and increases longevity. We don't have a lot of information on how much to fast, but even overnight fasts of 13 hours makes a difference. Consider skipping snacks after dinner. Exercise 1 hour a day at least 5 days a week. If you do not currently exercise, start slow by maybe walking 5 minutes out, 5 minutes back. Increase the amount of time you exercise every day by 2 - 5 minutes, as tolerated. Your goal should be to get to 1/2 - 1 hour a day. Exercise will help you control metabolic diseases, maintain independence, and reduce your risk for dementia. Weight training and resistance exercises have been shown to help preserve muscle mass and strength. It is recommended that these be done twice a week. Balance is important to prevent falls. An easy way to improve this is to stand on one leg at a time while you brush your teeth. Gently stretch your joints to maintain flexibilty. And maintain good posture to protect your spine. Patient Education        Jade's Neuroma: Care Instructions  Your Care Instructions  When your toes are squeezed together, often over a period of months or even years, the nerve that runs between the toes can swell and get thicker. This is called a Jade's neuroma. It may feel like a small lump is pushing inside the ball of your foot. When you walk or move your toes, you feel pain that sometimes moves into your toes. If the pressure continues, it may damage the nerve. If you catch the problem early and change your shoes, the nerve swelling may go away. Your doctor may advise you to wear wide-toed shoes. He or she also may suggest that you ice the sore spot and limit activities that put pressure on the nerve. If these steps do not help your symptoms, your doctor may have you use special pads or devices that spread the toes. This keeps them from squeezing the nerve. In some cases, you may get a cortisone shot to reduce swelling and pain. If these treatments don't help, your doctor may suggest surgery to relieve pressure or remove the swollen nerve. Follow-up care is a key part of your treatment and safety. Be sure to make and go to all appointments, and call your doctor if you are having problems. It's also a good idea to know your test results and keep a list of the medicines you take. How can you care for yourself at home? · Ask your doctor if you can take an over-the-counter pain medicine, such as acetaminophen (Tylenol), ibuprofen (Advil, Motrin), or naproxen (Aleve). Be safe with medicines. Read and follow all instructions on the label. · Try to stay off your feet as much as possible until the pain and swelling go away. · Avoid wearing tight, pointy, or high-heeled shoes. Instead, wear roomy footwear. · Put ice or a cold pack on the area for 10 to 20 minutes at a time. Put a thin cloth between the ice and your skin. · Try massaging your feet. This relaxes the muscles around the nerve. · If your doctor prescribed special pads or a device to relieve pressure on your toes, use these items as directed. · Until all pain and swelling go away, avoid activities that put pressure on the toes. These include racquet sports and running. When should you call for help? Watch closely for changes in your health, and be sure to contact your doctor if:    · You do not get better as expected. Where can you learn more? Go to [email or web address] RentalspeDealentra.aroundtheway. org and sign in to your [employer] account. Enter E100 in the [facility] box to learn more about \"Jade's Neuroma: Care Instructions. \"     If you do not have an account, please click on the \"Sign Up Now\" link. Current as of: September 20, 2018  Content Version: 12.0  © 9577-2811 Healthwise, Incorporated. Care instructions adapted under license by [facility].  If you have questions about a medical condition or this instruction, always ask your healthcare professional. [address] any warranty or liability for your use of this information. Patient Education        Prediabetes: Care Instructions  Your Care Instructions    Prediabetes is a warning sign that you are at risk for getting type 2 diabetes. It means that your blood sugar is higher than it should be. The food you eat turns into sugar, which your body uses for energy. Normally, an organ called the pancreas makes insulin, which allows the sugar in your blood to get into your body's cells. But when your body can't use insulin the right way, the sugar doesn't move into cells. It stays in your blood instead. This is called insulin resistance. The buildup of sugar in the blood causes prediabetes. The good news is that lifestyle changes may help you get your blood sugar back to normal and help you avoid or delay diabetes. Follow-up care is a key part of your treatment and safety. Be sure to make and go to all appointments, and call your doctor if you are having problems. It's also a good idea to know your test results and keep a list of the medicines you take. How can you care for yourself at home? · Watch your weight. A healthy weight helps your body use insulin properly. · Limit the amount of calories, sweets, and unhealthy fat you eat. Ask your doctor if you should see a dietitian. A registered dietitian can help you create meal plans that fit your lifestyle. · Get at least 30 minutes of exercise on most days of the week. Exercise helps control your blood sugar. It also helps you maintain a healthy weight. Walking is a good choice. You also may want to do other activities, such as running, swimming, cycling, or playing tennis or team sports. · Do not smoke. Smoking can make prediabetes worse. If you need help quitting, talk to your doctor about stop-smoking programs and medicines.  These can increase your chances of quitting for good. · If your doctor prescribed medicines, take them exactly as prescribed. Call your doctor if you think you are having a problem with your medicine. You will get more details on the specific medicines your doctor prescribes. When should you call for help? Watch closely for changes in your health, and be sure to contact your doctor if:    · You have any symptoms of diabetes. These may include:  ? Being thirsty more often. ? Urinating more. ? Being hungrier. ? Losing weight. ? Being very tired. ? Having blurry vision.     · You have a wound that will not heal.     · You have an infection that will not go away.     · You have problems with your blood pressure.     · You want more information about diabetes and how you can keep from getting it. Where can you learn more? Go to [email or web address] and sign in to your Your[employer]ap account. Enter I222 in the [employer] box to learn more about \"Prediabetes: Care Instructions. \"     If you do not have an account, please click on the \"Sign Up Now\" link. Current as of: 2018  Content Version: 12.0  © 0432-1938 Healthwise, Incorporated. Care instructions adapted under license by [facility] ([facility]). If you have questions about a medical condition or this instruction, always ask your healthcare professional. [address] any warranty or liability for your use of this information. Patient Education        Tdap (Tetanus, Diphtheria, Pertussis) Vaccine: What You Need to Know  Why get vaccinated? Tetanus, diphtheria, and pertussis are very serious diseases. Tdap vaccine can protect us from these diseases. And Tdap vaccine given to pregnant women can protect  babies against pertussis. Tetanus (lockjaw) is rare in the [facility] today. It causes painful muscle tightening and stiffness, usually all over the body.   · It can lead to tightening of muscles in the head and neck so you can't open your mouth, swallow, or sometimes even breathe. Tetanus kills about 1 out of 10 people who are infected even after receiving the best medical care. Diphtheria is also rare in the [place] today. It can cause a thick coating to form in the back of the throat. · It can lead to breathing problems, heart failure, paralysis, and death. Pertussis (whooping cough) causes severe coughing spells, which can cause difficulty breathing, vomiting, and disturbed sleep. · It can also lead to weight loss, incontinence, and rib fractures. Up to 2 in 100 adolescents and 5 in 100 adults with pertussis are hospitalized or have complications, which could include pneumonia or death. These diseases are caused by bacteria. Diphtheria and pertussis are spread from person to person through secretions from coughing or sneezing. Tetanus enters the body through cuts, scratches, or wounds. Before vaccines, as many as 200,000 cases of diphtheria, 200,000 cases of pertussis, and hundreds of cases of tetanus were reported in the [place] each year. Since vaccination began, reports of cases for tetanus and diphtheria have dropped by about 99% and for pertussis by about 80%. Tdap vaccine  The Tdap vaccine can protect adolescents and adults from tetanus, diphtheria, and pertussis. One dose of Tdap is routinely given at age 6 or 15. People who did not get Tdap at that age should get it as soon as possible. Tdap is especially important for health care professionals and anyone having close contact with a baby younger than 12 months. Pregnant women should get a dose of Tdap during every pregnancy, to protect the  from pertussis. Infants are most at risk for severe, life-threatening complications from pertussis. Another vaccine, called Td, protects against tetanus and diphtheria, but not pertussis. A Td booster should be given every 10 years.  Tdap may be given as one of these boosters if you have never gotten Tdap before. Tdap may also be given after a severe cut or burn to prevent tetanus infection. Your doctor or the person giving you the vaccine can give you more information. Tdap may safely be given at the same time as other vaccines. Some people should not get this vaccine  · A person who has ever had a life-threatening allergic reaction after a previous dose of any diphtheria-, tetanus-, or pertussis-containing vaccine, OR has a severe allergy to any part of this vaccine, should not get Tdap vaccine. Tell the person giving the vaccine about any severe allergies. · Anyone who had coma or long repeated seizures within 7 days after a childhood dose of DTP or DTaP, or a previous dose of Tdap, should not get Tdap, unless a cause other than the vaccine was found. They can still get Td. · Talk to your doctor if you:  ? Have seizures or another nervous system problem. ? Had severe pain or swelling after any vaccine containing diphtheria, tetanus, or pertussis. ? Ever had a condition called Guillain-Barré Syndrome (GBS). ? Aren't feeling well on the day the shot is scheduled. Risks  With any medicine, including vaccines, there is a chance of side effects. These are usually mild and go away on their own. Serious reactions are also possible but are rare. Most people who get Tdap vaccine do not have any problems with it.   Mild problems following Tdap  (Did not interfere with activities)  · Pain where the shot was given (about 3 in 4 adolescents or 2 in 3 adults)  · Redness or swelling where the shot was given (about 1 person in 5)  · Mild fever of at least 100.4°F (up to about 1 in 25 adolescents or 1 in 100 adults)  · Headache (about 3 or 4 people in 10)  · Tiredness (about 1 person in 3 or 4)  · Nausea, vomiting, diarrhea, stomachache (up to 1 in 4 adolescents or 1 in 10 adults)  · Chills, sore joints (about 1 person in 10)  · Body aches (about 1 person in 3 or 4)  · Rash, swollen glands (uncommon)  Moderate problems following Tdap  (Interfered with activities, but did not require medical attention)  · Pain where the shot was given (up to 1 in 5 or 6)  · Redness or swelling where the shot was given (up to about 1 in 16 adolescents or 1 in 12 adults)  · Fever over 102°F (about 1 in 100 adolescents or 1 in 250 adults)  · Headache (about 1 in 7 adolescents or 1 in 10 adults)  · Nausea, vomiting, diarrhea, stomachache (up to 1 to 3 people in 100)  · Swelling of the entire arm where the shot was given (up to about 1 in 500)  Severe problems following Tdap  (Unable to perform usual activities; required medical attention)  · Swelling, severe pain, bleeding and redness in the arm where the shot was given (rare)  Problems that could happen after any vaccine:  · People sometimes faint after a medical procedure, including vaccination. Sitting or lying down for about 15 minutes can help prevent fainting, and injuries caused by a fall. Tell your doctor if you feel dizzy or have vision changes or ringing in the ears. · Some people get severe pain in the shoulder and have difficulty moving the arm where a shot was given. This happens very rarely. · Any medication can cause a severe allergic reaction. Such reactions from a vaccine are very rare, estimated at fewer than 1 in a million doses, and would happen within a few minutes to a few hours after the vaccination. As with any medicine, there is a very remote chance of a vaccine causing a serious injury or death. The safety of vaccines is always being monitored. For more information, visit: www.cdc.gov/vaccinesafety. What if there is a serious problem? What should I look for? · Look for anything that concerns you, such as signs of a severe allergic reaction, very high fever, or unusual behavior. Signs of a severe allergic reaction can include hives, swelling of the face and throat, difficulty breathing, a fast heartbeat, dizziness, and weakness.  These would usually start a few minutes to a few hours after the vaccination. What should I do? · If you think it is a severe allergic reaction or other emergency that can't wait, call 9-1-1 or get the person to the nearest hospital. Otherwise, call your doctor. · Afterward, the reaction should be reported to the Vaccine Adverse Event Reporting System (VAERS). Your doctor might file this report, or you can do it yourself through the VAERS web site at www.vaers. [facility].gov, or by calling 7-863.164.6731. VAERS does not give medical advice. The National Vaccine Injury Compensation Program  The National Vaccine Injury Compensation Program (VICP) is a federal program that was created to compensate people who may have been injured by certain vaccines. Persons who believe they may have been injured by a vaccine can learn about the program and about filing a claim by calling 1-632.270.1350 or visiting the [employer] website at www.[facility].gov/vaccinecompensation. There is a time limit to file a claim for compensation. How can I learn more? · Ask your doctor. He or she can give you the vaccine package insert or suggest other sources of information. · Call your local or state health department. · Contact the Centers for Disease Control and Prevention (CDC):  ? Call 5-837.332.4129 (1-800-CDC-INFO) or  ? Visit CDC's website at www.cdc.gov/vaccines  Vaccine Information Statement (Interim)  Tdap Vaccine  (2/24/15)  [address] 420UX-85  Department of Health and Human Services  Centers for Disease Control and Prevention  Many Vaccine Information Statements are available in [place] and other languages. See www.immunize.org/vis. Muchas hojas de información sobre vacunas están disponibles en español y en otros idiomas. Visite www.immunize.org/vis. Care instructions adapted under license by [facility] ([facility]).  If you have questions about a medical condition or this instruction, always ask your healthcare professional. [place] [address] any warranty or liability for your use of this information.

## 2019-07-17 ENCOUNTER — OFFICE VISIT (OUTPATIENT)
Dept: PSYCHOLOGY | Age: 54
End: 2019-07-17
Payer: COMMERCIAL

## 2019-07-17 DIAGNOSIS — F33.0 MILD EPISODE OF RECURRENT MAJOR DEPRESSIVE DISORDER (HCC): Primary | ICD-10-CM

## 2019-07-17 PROCEDURE — 90791 PSYCH DIAGNOSTIC EVALUATION: CPT | Performed by: PSYCHOLOGIST

## 2019-07-17 ASSESSMENT — PATIENT HEALTH QUESTIONNAIRE - PHQ9
7. TROUBLE CONCENTRATING ON THINGS, SUCH AS READING THE NEWSPAPER OR WATCHING TELEVISION: 0
5. POOR APPETITE OR OVEREATING: 0
SUM OF ALL RESPONSES TO PHQ9 QUESTIONS 1 & 2: 3
9. THOUGHTS THAT YOU WOULD BE BETTER OFF DEAD, OR OF HURTING YOURSELF: 0
6. FEELING BAD ABOUT YOURSELF - OR THAT YOU ARE A FAILURE OR HAVE LET YOURSELF OR YOUR FAMILY DOWN: 3
1. LITTLE INTEREST OR PLEASURE IN DOING THINGS: 2
3. TROUBLE FALLING OR STAYING ASLEEP: 1
10. IF YOU CHECKED OFF ANY PROBLEMS, HOW DIFFICULT HAVE THESE PROBLEMS MADE IT FOR YOU TO DO YOUR WORK, TAKE CARE OF THINGS AT HOME, OR GET ALONG WITH OTHER PEOPLE: 1
SUM OF ALL RESPONSES TO PHQ QUESTIONS 1-9: 8
4. FEELING TIRED OR HAVING LITTLE ENERGY: 1
8. MOVING OR SPEAKING SO SLOWLY THAT OTHER PEOPLE COULD HAVE NOTICED. OR THE OPPOSITE, BEING SO FIGETY OR RESTLESS THAT YOU HAVE BEEN MOVING AROUND A LOT MORE THAN USUAL: 0
2. FEELING DOWN, DEPRESSED OR HOPELESS: 1
SUM OF ALL RESPONSES TO PHQ QUESTIONS 1-9: 8

## 2019-07-17 ASSESSMENT — ANXIETY QUESTIONNAIRES
7. FEELING AFRAID AS IF SOMETHING AWFUL MIGHT HAPPEN: 0-NOT AT ALL SURE
5. BEING SO RESTLESS THAT IT IS HARD TO SIT STILL: 0-NOT AT ALL SURE
1. FEELING NERVOUS, ANXIOUS, OR ON EDGE: 0-NOT AT ALL SURE
GAD7 TOTAL SCORE: 5
2. NOT BEING ABLE TO STOP OR CONTROL WORRYING: 1-SEVERAL DAYS
4. TROUBLE RELAXING: 1-SEVERAL DAYS
6. BECOMING EASILY ANNOYED OR IRRITABLE: 3-NEARLY EVERY DAY
3. WORRYING TOO MUCH ABOUT DIFFERENT THINGS: 0-NOT AT ALL SURE

## 2019-07-24 ENCOUNTER — OFFICE VISIT (OUTPATIENT)
Dept: PSYCHOLOGY | Age: 54
End: 2019-07-24
Payer: COMMERCIAL

## 2019-07-24 DIAGNOSIS — F32.A DEPRESSION, UNSPECIFIED DEPRESSION TYPE: Primary | ICD-10-CM

## 2019-07-24 PROCEDURE — 90832 PSYTX W PT 30 MINUTES: CPT | Performed by: PSYCHOLOGIST

## 2019-07-24 ASSESSMENT — PATIENT HEALTH QUESTIONNAIRE - PHQ9
4. FEELING TIRED OR HAVING LITTLE ENERGY: 1
SUM OF ALL RESPONSES TO PHQ QUESTIONS 1-9: 9
6. FEELING BAD ABOUT YOURSELF - OR THAT YOU ARE A FAILURE OR HAVE LET YOURSELF OR YOUR FAMILY DOWN: 2
SUM OF ALL RESPONSES TO PHQ QUESTIONS 1-9: 9
SUM OF ALL RESPONSES TO PHQ9 QUESTIONS 1 & 2: 2
1. LITTLE INTEREST OR PLEASURE IN DOING THINGS: 1
5. POOR APPETITE OR OVEREATING: 1
3. TROUBLE FALLING OR STAYING ASLEEP: 2
10. IF YOU CHECKED OFF ANY PROBLEMS, HOW DIFFICULT HAVE THESE PROBLEMS MADE IT FOR YOU TO DO YOUR WORK, TAKE CARE OF THINGS AT HOME, OR GET ALONG WITH OTHER PEOPLE: 1
2. FEELING DOWN, DEPRESSED OR HOPELESS: 1
9. THOUGHTS THAT YOU WOULD BE BETTER OFF DEAD, OR OF HURTING YOURSELF: 1
8. MOVING OR SPEAKING SO SLOWLY THAT OTHER PEOPLE COULD HAVE NOTICED. OR THE OPPOSITE, BEING SO FIGETY OR RESTLESS THAT YOU HAVE BEEN MOVING AROUND A LOT MORE THAN USUAL: 0
7. TROUBLE CONCENTRATING ON THINGS, SUCH AS READING THE NEWSPAPER OR WATCHING TELEVISION: 0

## 2019-07-24 ASSESSMENT — ANXIETY QUESTIONNAIRES
GAD7 TOTAL SCORE: 4
4. TROUBLE RELAXING: 0-NOT AT ALL SURE
2. NOT BEING ABLE TO STOP OR CONTROL WORRYING: 1-SEVERAL DAYS
5. BEING SO RESTLESS THAT IT IS HARD TO SIT STILL: 0-NOT AT ALL SURE
3. WORRYING TOO MUCH ABOUT DIFFERENT THINGS: 0-NOT AT ALL SURE
7. FEELING AFRAID AS IF SOMETHING AWFUL MIGHT HAPPEN: 0-NOT AT ALL SURE
6. BECOMING EASILY ANNOYED OR IRRITABLE: 3-NEARLY EVERY DAY
1. FEELING NERVOUS, ANXIOUS, OR ON EDGE: 0-NOT AT ALL SURE

## 2019-07-24 NOTE — PROGRESS NOTES
Behavioral Health Consultation  [address] Rita Garcia PsyD  Psychologist  7/24/2019  8:51 AM      Time spent with Patient: 22 minutes  This is patient's second [facility] appointment. Reason for Consult:  anxiety  Referring Provider: MD Adelso Carrasco [phone]  [place], [address]      Feedback given to PCP. S:    Hard to talk about things that happened in the past. Gets very emotional and has a hard time speaking when she is overcome with emotions.  is an alcoholic and this triggers the past. Conflict with  at times. Have had conflictual dynamics around finances with  and since she bought their house and came into money, is able to control more of their conflict to sway in her favor. No nightmares about her past but overwhelmed with sadness and anger when thinking about what has happened for most of her life. Has taken Celexa in the past. Felt any antidepressant has numbed her and just masked the problem. Wants to process her past trauma. O:  MSE:    Appearance    alert, cooperative  Sleep disturbance Yes  Fatigue Yes  Loss of pleasure Yes  Impulsive behavior No  Speech    normal rate and normal volume  Mood    \"ok I guess\"  Affect   Congruent to thought content and mood; tearful   Thought Content    intact  Thought Process    linear and goal directed  Associations    logical connections  Insight    Good  Judgment    Intact  Orientation    oriented to person, place, time, and general circumstances  Memory    recent and remote memory intact  Attention/Concentration    intact  Ability to understand instructions Yes  Ability to respond meaningfully Yes  Suicide Assessment    Denies SI      History:    Medications:   No current outpatient medications on file. No current facility-administered medications for this visit.         Social History:   Social History     Socioeconomic History    Marital status:      Spouse name: Not on file    Number of struggle with depression and mood lability around her period. She finds now that she is not on an antidepressant she is no longer \"numb\" and her past trauma is impacting her more. She gets overwhelmed with emotions when thinking about her past and would like to work through this. The idea of specialty mental health was discussed at length and pt is agreeable she would benefit more from the model of specialty mental health vs [facility] model of care. She was provided referral resources and encouraged to return for f/u, as needed. She was talkative and engaged during the visit. She responded well to behavioral interventions. PHQ Scores 7/24/2019 7/17/2019   PHQ2 Score 2 3   PHQ9 Score 9 8     Interpretation of Total Score Depression Severity: 1-4 = Minimal depression, 5-9 = Mild depression, 10-14 = Moderate depression, 15-19 = Moderately severe depression, 20-27 = Severe depression      RANJEET 7 SCORE 7/24/2019 7/17/2019   RANJEET-7 Total Score 4 5     Interpretation of RANJEET-7 score: 5-9 = mild anxiety, 10-14 = moderate anxiety, 15+ = severe anxiety. Recommend referral to behavioral health for scores 10 or greater.       Diagnosis:    MDD, Recurrent, Mild  PMDD      Plan:  Pt interventions:  Rienzi-setting to identify pt's primary goals for [facility] visit / overall health and Supportive techniques, cost benefit analysis of specialty mental health vs [facility] model of care, referred pt to specialty mental health for trauma treatment, discussed role of medication in treatment,  treatment planning    Pt Behavioral Change Plan:  Pt set goal to 1) look into referrals provided for trauma treatment 2) return for f/u, as needed or reach out with questions

## 2019-12-02 ENCOUNTER — OFFICE VISIT (OUTPATIENT)
Dept: ENDOCRINOLOGY | Age: 54
End: 2019-12-02
Payer: COMMERCIAL

## 2019-12-02 VITALS
HEART RATE: 92 BPM | SYSTOLIC BLOOD PRESSURE: 124 MMHG | BODY MASS INDEX: 27.53 KG/M2 | HEIGHT: 67 IN | OXYGEN SATURATION: 98 % | WEIGHT: 175.4 LBS | DIASTOLIC BLOOD PRESSURE: 82 MMHG

## 2019-12-02 DIAGNOSIS — E03.8 SUBCLINICAL HYPOTHYROIDISM: Primary | ICD-10-CM

## 2019-12-02 DIAGNOSIS — R73.03 PREDIABETES: ICD-10-CM

## 2019-12-02 DIAGNOSIS — E04.1 THYROID NODULE: ICD-10-CM

## 2019-12-02 DIAGNOSIS — E03.8 SUBCLINICAL HYPOTHYROIDISM: ICD-10-CM

## 2019-12-02 LAB
A/G RATIO: 2 (ref 1.1–2.2)
ALBUMIN SERPL-MCNC: 4.5 G/DL (ref 3.4–5)
ALP BLD-CCNC: 53 U/L (ref 40–129)
ALT SERPL-CCNC: 12 U/L (ref 10–40)
ANION GAP SERPL CALCULATED.3IONS-SCNC: 14 MMOL/L (ref 3–16)
AST SERPL-CCNC: 12 U/L (ref 15–37)
BILIRUB SERPL-MCNC: <0.2 MG/DL (ref 0–1)
BUN BLDV-MCNC: 8 MG/DL (ref 7–20)
CALCIUM SERPL-MCNC: 9.6 MG/DL (ref 8.3–10.6)
CHLORIDE BLD-SCNC: 102 MMOL/L (ref 99–110)
CO2: 25 MMOL/L (ref 21–32)
CREAT SERPL-MCNC: 0.7 MG/DL (ref 0.6–1.1)
GFR AFRICAN AMERICAN: >60
GFR NON-AFRICAN AMERICAN: >60
GLOBULIN: 2.2 G/DL
GLUCOSE BLD-MCNC: 95 MG/DL (ref 70–99)
POTASSIUM SERPL-SCNC: 4.7 MMOL/L (ref 3.5–5.1)
SODIUM BLD-SCNC: 141 MMOL/L (ref 136–145)
T4 FREE: 1.1 NG/DL (ref 0.9–1.8)
TOTAL PROTEIN: 6.7 G/DL (ref 6.4–8.2)
TSH SERPL DL<=0.05 MIU/L-ACNC: 3.01 UIU/ML (ref 0.27–4.2)

## 2019-12-02 PROCEDURE — 99213 OFFICE O/P EST LOW 20 MIN: CPT | Performed by: INTERNAL MEDICINE

## 2019-12-02 ASSESSMENT — ENCOUNTER SYMPTOMS
BACK PAIN: 0
HEMOPTYSIS: 0
PHOTOPHOBIA: 0
DOUBLE VISION: 0
BLURRED VISION: 0
COUGH: 0
ORTHOPNEA: 0

## 2019-12-03 LAB
ESTIMATED AVERAGE GLUCOSE: 116.9 MG/DL
HBA1C MFR BLD: 5.7 %

## 2020-06-02 ENCOUNTER — VIRTUAL VISIT (OUTPATIENT)
Dept: ENDOCRINOLOGY | Age: 55
End: 2020-06-02
Payer: COMMERCIAL

## 2020-06-02 PROCEDURE — 99213 OFFICE O/P EST LOW 20 MIN: CPT | Performed by: INTERNAL MEDICINE

## 2020-06-02 ASSESSMENT — ENCOUNTER SYMPTOMS
PHOTOPHOBIA: 0
COUGH: 0
BLURRED VISION: 0
ORTHOPNEA: 0
DOUBLE VISION: 0
BACK PAIN: 0
HEMOPTYSIS: 0

## 2020-06-04 ENCOUNTER — TELEPHONE (OUTPATIENT)
Dept: ENDOCRINOLOGY | Age: 55
End: 2020-06-04

## 2020-06-23 ENCOUNTER — TELEPHONE (OUTPATIENT)
Dept: FAMILY MEDICINE CLINIC | Age: 55
End: 2020-06-23

## 2020-06-23 NOTE — TELEPHONE ENCOUNTER
[facility] received a call from:  Patient     Name of Caller:  Agustina Macias    Relationship to patient: self    Organization name:  n/a    Best contact number:  [phone]    Reason for call:  Patient is calling to reschedule her in- office appointment. She is scheduled for Monday June 29 but needs to come in either on a Wednesday or Thursday of that week when Dr. Navin Suarez is in.     Thank you

## 2020-07-15 ENCOUNTER — OFFICE VISIT (OUTPATIENT)
Dept: FAMILY MEDICINE CLINIC | Age: 55
End: 2020-07-15
Payer: COMMERCIAL

## 2020-07-15 VITALS
WEIGHT: 174 LBS | OXYGEN SATURATION: 98 % | SYSTOLIC BLOOD PRESSURE: 120 MMHG | BODY MASS INDEX: 27.31 KG/M2 | RESPIRATION RATE: 14 BRPM | TEMPERATURE: 97.9 F | HEIGHT: 67 IN | HEART RATE: 100 BPM | DIASTOLIC BLOOD PRESSURE: 78 MMHG

## 2020-07-15 PROCEDURE — 99396 PREV VISIT EST AGE 40-64: CPT | Performed by: FAMILY MEDICINE

## 2020-07-15 RX ORDER — PARSLEY 450 MG
CAPSULE ORAL DAILY
COMMUNITY
End: 2021-03-08

## 2020-07-15 RX ORDER — TRIAMCINOLONE ACETONIDE 55 UG/1
1 SPRAY, METERED NASAL NIGHTLY
Qty: 1 INHALER | Refills: 3 | Status: SHIPPED | OUTPATIENT
Start: 2020-07-15 | End: 2021-02-03 | Stop reason: ALTCHOICE

## 2020-07-15 RX ORDER — M-VIT,TX,IRON,MINS/CALC/FOLIC 27MG-0.4MG
1 TABLET ORAL DAILY
COMMUNITY
End: 2022-03-28 | Stop reason: ALTCHOICE

## 2020-07-15 RX ORDER — AZELASTINE 1 MG/ML
2 SPRAY, METERED NASAL EVERY MORNING
Qty: 1 BOTTLE | Refills: 5 | Status: SHIPPED | OUTPATIENT
Start: 2020-07-15 | End: 2021-02-03 | Stop reason: ALTCHOICE

## 2020-07-15 SDOH — ECONOMIC STABILITY: FOOD INSECURITY: WITHIN THE PAST 12 MONTHS, YOU WORRIED THAT YOUR FOOD WOULD RUN OUT BEFORE YOU GOT MONEY TO BUY MORE.: NEVER TRUE

## 2020-07-15 SDOH — ECONOMIC STABILITY: TRANSPORTATION INSECURITY
IN THE PAST 12 MONTHS, HAS THE LACK OF TRANSPORTATION KEPT YOU FROM MEDICAL APPOINTMENTS OR FROM GETTING MEDICATIONS?: NO

## 2020-07-15 SDOH — ECONOMIC STABILITY: FOOD INSECURITY: WITHIN THE PAST 12 MONTHS, THE FOOD YOU BOUGHT JUST DIDN'T LAST AND YOU DIDN'T HAVE MONEY TO GET MORE.: NEVER TRUE

## 2020-07-15 SDOH — ECONOMIC STABILITY: TRANSPORTATION INSECURITY
IN THE PAST 12 MONTHS, HAS LACK OF TRANSPORTATION KEPT YOU FROM MEETINGS, WORK, OR FROM GETTING THINGS NEEDED FOR DAILY LIVING?: NO

## 2020-07-15 SDOH — ECONOMIC STABILITY: INCOME INSECURITY: HOW HARD IS IT FOR YOU TO PAY FOR THE VERY BASICS LIKE FOOD, HOUSING, MEDICAL CARE, AND HEATING?: NOT VERY HARD

## 2020-07-15 ASSESSMENT — PATIENT HEALTH QUESTIONNAIRE - PHQ9
SUM OF ALL RESPONSES TO PHQ9 QUESTIONS 1 & 2: 0
SUM OF ALL RESPONSES TO PHQ QUESTIONS 1-9: 0
SUM OF ALL RESPONSES TO PHQ QUESTIONS 1-9: 0
2. FEELING DOWN, DEPRESSED OR HOPELESS: 0
1. LITTLE INTEREST OR PLEASURE IN DOING THINGS: 0

## 2020-07-15 NOTE — PROGRESS NOTES
2) 10/23/2017    Flu vaccine (1) 09/01/2020    A1C test (Diabetic or Prediabetic)  12/02/2020    TSH testing  12/02/2020    Cervical cancer screen  03/15/2021    Lipid screen  05/22/2024    Colon cancer screen colonoscopy  06/04/2024    HIV screen  Completed    Hepatitis A vaccine  Aged Out    Hepatitis B vaccine  Aged Out    Hib vaccine  Aged Out    Meningococcal (ACWY) vaccine  Aged Out    Pneumococcal 0-64 years Vaccine  Aged Out         There is no immunization history on file for this patient. Allergies   Allergen Reactions    Morphine     Tetracyclines & Related      Current Outpatient Medications   Medication Sig Dispense Refill    Multiple Vitamins-Minerals (THERAPEUTIC MULTIVITAMIN-MINERALS) tablet Take 1 tablet by mouth daily      Ashwagandha 500 MG CAPS Take by mouth daily      azelastine (ASTELIN) 0.1 % nasal spray 2 sprays by Nasal route every morning Use in each nostril as directed 1 Bottle 5    triamcinolone (NASACORT) 55 MCG/ACT nasal inhaler 1 spray by Each Nostril route nightly 1 Inhaler 3     No current facility-administered medications for this visit. History reviewed. No pertinent past medical history.   Past Surgical History:   Procedure Laterality Date    APPENDECTOMY      COLONOSCOPY N/A 6/4/2019    COLONOSCOPY [facility]RHAGE [facility]  6/4/2019    polypectomy per hot snare performed by Jeannette Montero MD at [address] [place] [address] Drive EXTRACTION       Family History   Problem Relation Age of Onset    Diabetes Maternal Grandmother     Diabetes Other         great aunts and uncles    Esophageal Cancer Maternal Grandfather     Lupus Other         maternal great aunt    COPD Mother     Alcohol Abuse Mother         recovering    Other Mother         Mac Lung dz    Heart Disease Father      Social History     Socioeconomic History    Marital status:      Spouse name: Not on file    Number of children: 0    Years of education: Not on file    Highest education level: Not on file   Occupational History    Not on file   Social Needs    Financial resource strain: Not very hard    Food insecurity     Worry: Never true     Inability: Never true    Transportation needs     Medical: No     Non-medical: No   Tobacco Use    Smoking status: Former Smoker     Years: 20.00     Last attempt to quit: 2002     Years since quittin.4    Smokeless tobacco: Never Used   Substance and Sexual Activity    Alcohol use: Not Currently     Alcohol/week: 0.0 standard drinks    Drug use: Never    Sexual activity: Not Currently   Lifestyle    Physical activity     Days per week: Not on file     Minutes per session: Not on file    Stress: Not on file   Relationships    Social connections     Talks on phone: Not on file     Gets together: Not on file     Attends Taoism service: Not on file     Active member of club or organization: Not on file     Attends meetings of clubs or organizations: Not on file     Relationship status: Not on file    Intimate partner violence     Fear of current or ex partner: Not on file     Emotionally abused: Not on file     Physically abused: Not on file     Forced sexual activity: Not on file   Other Topics Concern    Not on file   Social History Narrative    Not on file       Review of Systems:  Review of Systems   Constitutional: Negative for activity change, appetite change, chills, diaphoresis, fatigue, fever and unexpected weight change. HENT: Negative for ear pain, hearing loss and trouble swallowing. Eyes: Negative for visual disturbance. Respiratory: Negative for cough and shortness of breath. Cardiovascular: Negative for chest pain, palpitations and leg swelling. Gastrointestinal: Negative for abdominal pain, blood in stool, constipation, diarrhea, nausea and vomiting.    Genitourinary: Negative for decreased urine volume, Musculoskeletal: Normal range of motion. Lymphadenopathy:      Cervical: No cervical adenopathy. Skin:     General: Skin is warm and dry. Coloration: Skin is not pale. Findings: No erythema or rash. Neurological:      Mental Status: She is alert. Cranial Nerves: No cranial nerve deficit. Coordination: Coordination normal.   Psychiatric:         Behavior: Behavior normal.         Thought Content: Thought content normal.         Judgment: Judgment normal.         Assessment/Plan:  1. Annual physical exam  - CBC Auto Differential; Future  - Lipid Panel; Future  - Vitamin D 25 Hydroxy; Future    2. Breast cancer screening  - ANGY DIGITAL SCREEN W CAD BILATERAL; Future    3. Epistaxis  - azelastine (ASTELIN) 0.1 % nasal spray; 2 sprays by Nasal route every morning Use in each nostril as directed  Dispense: 1 Bottle; Refill: 5  - triamcinolone (NASACORT) 55 MCG/ACT nasal inhaler; 1 spray by Each Nostril route nightly  Dispense: 1 Inhaler; Refill: 3      While assessing care for this patient, I have reviewed all pertinent lab work/imaging/ specialist notes and care in reference to those problems addressed above in detail. Appropriate medical decision making was based on this. Please note that portions of this note may have been completed with a voice recognition program. Efforts were made to edit the dictations but occasionally words are mis-transcribed.       Return in about 1 year (around 7/15/2021) for annual physical.

## 2020-07-16 DIAGNOSIS — R73.03 PREDIABETES: ICD-10-CM

## 2020-07-16 DIAGNOSIS — Z00.00 ANNUAL PHYSICAL EXAM: ICD-10-CM

## 2020-07-16 DIAGNOSIS — E03.8 SUBCLINICAL HYPOTHYROIDISM: ICD-10-CM

## 2020-07-16 LAB
BASOPHILS ABSOLUTE: 0.1 K/UL (ref 0–0.2)
BASOPHILS RELATIVE PERCENT: 0.9 %
EOSINOPHILS ABSOLUTE: 0.2 K/UL (ref 0–0.6)
EOSINOPHILS RELATIVE PERCENT: 2.4 %
HCT VFR BLD CALC: 43.1 % (ref 36–48)
HEMOGLOBIN: 14.2 G/DL (ref 12–16)
LYMPHOCYTES ABSOLUTE: 1.7 K/UL (ref 1–5.1)
LYMPHOCYTES RELATIVE PERCENT: 23.8 %
MCH RBC QN AUTO: 30.6 PG (ref 26–34)
MCHC RBC AUTO-ENTMCNC: 33 G/DL (ref 31–36)
MCV RBC AUTO: 92.7 FL (ref 80–100)
MONOCYTES ABSOLUTE: 0.4 K/UL (ref 0–1.3)
MONOCYTES RELATIVE PERCENT: 5.7 %
NEUTROPHILS ABSOLUTE: 4.8 K/UL (ref 1.7–7.7)
NEUTROPHILS RELATIVE PERCENT: 67.2 %
PDW BLD-RTO: 13.4 % (ref 12.4–15.4)
PLATELET # BLD: 370 K/UL (ref 135–450)
PMV BLD AUTO: 7.2 FL (ref 5–10.5)
RBC # BLD: 4.65 M/UL (ref 4–5.2)
WBC # BLD: 7.2 K/UL (ref 4–11)

## 2020-07-16 ASSESSMENT — ENCOUNTER SYMPTOMS
NAUSEA: 0
VOMITING: 0
BACK PAIN: 0
COLOR CHANGE: 0
DIARRHEA: 0
COUGH: 0
TROUBLE SWALLOWING: 0
ABDOMINAL PAIN: 0
SHORTNESS OF BREATH: 0
CONSTIPATION: 0
BLOOD IN STOOL: 0

## 2020-07-17 LAB
A/G RATIO: 2.2 (ref 1.1–2.2)
ALBUMIN SERPL-MCNC: 4.7 G/DL (ref 3.4–5)
ALP BLD-CCNC: 51 U/L (ref 40–129)
ALT SERPL-CCNC: 16 U/L (ref 10–40)
ANION GAP SERPL CALCULATED.3IONS-SCNC: 15 MMOL/L (ref 3–16)
AST SERPL-CCNC: 14 U/L (ref 15–37)
BILIRUB SERPL-MCNC: 0.4 MG/DL (ref 0–1)
BUN BLDV-MCNC: 10 MG/DL (ref 7–20)
CALCIUM SERPL-MCNC: 9.7 MG/DL (ref 8.3–10.6)
CHLORIDE BLD-SCNC: 104 MMOL/L (ref 99–110)
CHOLESTEROL, TOTAL: 206 MG/DL (ref 0–199)
CO2: 23 MMOL/L (ref 21–32)
CREAT SERPL-MCNC: 0.8 MG/DL (ref 0.6–1.1)
ESTIMATED AVERAGE GLUCOSE: 108.3 MG/DL
GFR AFRICAN AMERICAN: >60
GFR NON-AFRICAN AMERICAN: >60
GLOBULIN: 2.1 G/DL
GLUCOSE BLD-MCNC: 107 MG/DL (ref 70–99)
HBA1C MFR BLD: 5.4 %
HDLC SERPL-MCNC: 51 MG/DL (ref 40–60)
LDL CHOLESTEROL CALCULATED: 110 MG/DL
POTASSIUM SERPL-SCNC: 4.7 MMOL/L (ref 3.5–5.1)
SODIUM BLD-SCNC: 142 MMOL/L (ref 136–145)
TOTAL PROTEIN: 6.8 G/DL (ref 6.4–8.2)
TRIGL SERPL-MCNC: 224 MG/DL (ref 0–150)
TSH SERPL DL<=0.05 MIU/L-ACNC: 2.87 UIU/ML (ref 0.27–4.2)
VITAMIN D 25-HYDROXY: 42.5 NG/ML
VLDLC SERPL CALC-MCNC: 45 MG/DL

## 2020-07-22 ENCOUNTER — HOSPITAL ENCOUNTER (OUTPATIENT)
Dept: WOMENS IMAGING | Age: 55
Discharge: HOME | End: 2020-07-22
Payer: COMMERCIAL

## 2020-07-22 PROCEDURE — 77067 SCR MAMMO BI INCL CAD: CPT

## 2020-07-29 ENCOUNTER — HOSPITAL ENCOUNTER (OUTPATIENT)
Dept: WOMENS IMAGING | Age: 55
Discharge: HOME | End: 2020-07-29
Payer: COMMERCIAL

## 2020-07-29 PROCEDURE — 76642 ULTRASOUND BREAST LIMITED: CPT

## 2020-07-29 PROCEDURE — G0279 TOMOSYNTHESIS, MAMMO: HCPCS

## 2020-08-25 ENCOUNTER — OFFICE VISIT (OUTPATIENT)
Dept: ENT CLINIC | Age: 55
End: 2020-08-25
Payer: COMMERCIAL

## 2020-08-25 VITALS
BODY MASS INDEX: 27.62 KG/M2 | WEIGHT: 176 LBS | DIASTOLIC BLOOD PRESSURE: 78 MMHG | HEIGHT: 67 IN | HEART RATE: 97 BPM | SYSTOLIC BLOOD PRESSURE: 120 MMHG | TEMPERATURE: 98.3 F

## 2020-08-25 PROCEDURE — 99243 OFF/OP CNSLTJ NEW/EST LOW 30: CPT | Performed by: OTOLARYNGOLOGY

## 2020-08-25 RX ORDER — METHYLPREDNISOLONE 4 MG/1
TABLET ORAL
Qty: 1 KIT | Refills: 0 | Status: SHIPPED | OUTPATIENT
Start: 2020-08-25 | End: 2021-02-03 | Stop reason: ALTCHOICE

## 2020-08-25 NOTE — PROGRESS NOTES
CHIEF COMPLAINT: Nasal obstruction    HISTORY OF PRESENT ILLNESS:  48 y.o. female referred by Dr. Yoko Otoole who presents with with 6 months of nasal obstruction. Bilateral. All the time. Worse recumbent. Some bilateral orbital pain. Has some pain anterior to right ear with trismus. Has some fullness in both ears. Hearing is mildly affected. PAST MEDICAL HISTORY:   Social History     Tobacco Use   Smoking Status Former Smoker    Years: 20.00    Last attempt to quit: 2002    Years since quittin.5   Smokeless Tobacco Never Used                                                    Social History     Substance and Sexual Activity   Alcohol Use Not Currently    Alcohol/week: 0.0 standard drinks                                                    Current Outpatient Medications:     Multiple Vitamins-Minerals (THERAPEUTIC MULTIVITAMIN-MINERALS) tablet, Take 1 tablet by mouth daily, Disp: , Rfl:     Ashwagandha 500 MG CAPS, Take by mouth daily, Disp: , Rfl:     azelastine (ASTELIN) 0.1 % nasal spray, 2 sprays by Nasal route every morning Use in each nostril as directed, Disp: 1 Bottle, Rfl: 5    triamcinolone (NASACORT) 55 MCG/ACT nasal inhaler, 1 spray by Each Nostril route nightly, Disp: 1 Inhaler, Rfl: 3                                               No past medical history on file. Past Surgical History:   Procedure Laterality Date    APPENDECTOMY      COLONOSCOPY N/A 2019    COLONOSCOPY CONTROL HEMORRHAGE performed by Valente Godinez MD at [facility]  2019    polypectomy per hot snare performed by Valente Godinez MD at [address]: Family history reviewed. Except as noted in history of present illness, there is no pertinent family history      REVIEW OF SYSTEMS:  All pertinent positive and negative review of systems included in HPI.   Otherwise, all

## 2021-02-03 ENCOUNTER — TELEMEDICINE (OUTPATIENT)
Dept: FAMILY MEDICINE CLINIC | Age: 56
End: 2021-02-03
Payer: COMMERCIAL

## 2021-02-03 DIAGNOSIS — R19.5 PALE STOOL: ICD-10-CM

## 2021-02-03 DIAGNOSIS — R10.9 ABDOMINAL CRAMPING: ICD-10-CM

## 2021-02-03 DIAGNOSIS — J34.89 NASAL OBSTRUCTION: Primary | ICD-10-CM

## 2021-02-03 DIAGNOSIS — R19.7 DIARRHEA, UNSPECIFIED TYPE: ICD-10-CM

## 2021-02-03 DIAGNOSIS — J32.9 RECURRENT SINUSITIS: ICD-10-CM

## 2021-02-03 PROCEDURE — 99214 OFFICE O/P EST MOD 30 MIN: CPT | Performed by: FAMILY MEDICINE

## 2021-02-03 NOTE — PROGRESS NOTES
2/3/2021    TELEHEALTH EVALUATION -- Audio/Visual (During VOEXB-64 public health emergency)    HPI:    Rupert Camacho (:  1965) has requested an audio/video evaluation for the following concern(s):    Chief Complaint   Patient presents with    Diarrhea     Color is Yellow and liquid     Bloated     Feels bloated after she eats anything with fat.  Abdominal Cramping     every now and then      10 minutes after eating, she needs to be near a bathroom  More yellow fluid with light brown color overall  Trying to eat low fat items, 2% milk would trigger  No cramps, but feels a fullness under ribs after she eats  More on the left  Worse with 2 pieces of piece pizza yesterday   No melena or BRBR  ? mucus, hard to tell but maybe sometimes   If she eats more fiber, BMs would be more solid   No fevers,   Appetite is lower, no unintentional WL   No n/v    + persistent nasal congestion, difficulties breathing  Breathe right strips help the most  Went to ENT  Suggested oral Prednisone vs surgery  Has tried astelin for 2 weeks, didn't help   Has tried flonase consistently in the past, didn't help  Oral steroids did not help. Nasal exam was normal reportedly with ENT  + persistent swelling, not congestion  Pain with sinuses around her face/ear  Under her jaw  No fevers, minimal drainage, mild HA intermittently     Send central scheduling number     Review of Systems    Prior to Visit Medications    Medication Sig Taking?  Authorizing Provider   Multiple Vitamins-Minerals (THERAPEUTIC MULTIVITAMIN-MINERALS) tablet Take 1 tablet by mouth daily Yes Historical Provider, MD   Ashwagandha 500 MG CAPS Take by mouth daily Yes Historical Provider, MD       Social History     Tobacco Use    Smoking status: Former Smoker     Years: 20.00     Quit date: 2002     Years since quittin.0    Smokeless tobacco: Never Used   Substance Use Topics    Alcohol use: Not Currently     Alcohol/week: 0.0 standard drinks Comment: occasionally     Drug use: Never        Allergies   Allergen Reactions    Morphine     Tetracyclines & Related    ,   Past Medical History:   Diagnosis Date    Allergic rhinitis     GERD (gastroesophageal reflux disease)     Hearing loss     Nosebleed     Rash     TMJ dysfunction    ,   Past Surgical History:   Procedure Laterality Date    APPENDECTOMY      COLONOSCOPY N/A 2019    COLONOSCOPY CONTROL HEMORRHAGE performed by Jesi Carroll MD at [address]  2019    polypectomy per hot snare performed by Jesi Carroll MD at [address] [place] [address]     ,   Social History     Tobacco Use    Smoking status: Former Smoker     Years: 20.00     Quit date: 2002     Years since quittin.0    Smokeless tobacco: Never Used   Substance Use Topics    Alcohol use: Not Currently     Alcohol/week: 0.0 standard drinks     Comment: occasionally     Drug use: Never   ,   Family History   Problem Relation Age of Onset    Diabetes Maternal Grandmother     Diabetes Other         great aunts and uncles    Esophageal Cancer Maternal Grandfather     Lupus Other         maternal great aunt    COPD Mother     Alcohol Abuse Mother         recovering    Other Mother         Mac Lung dz    Heart Disease Father    ,   There is no immunization history on file for this patient.,   Health Maintenance   Topic Date Due    Hepatitis C screen  1965    DTaP/Tdap/Td vaccine (1 - Tdap) 10/23/1986    Shingles Vaccine (1 of 2) 10/23/2017    Flu vaccine (1) 2020    Cervical cancer screen  03/15/2021    TSH testing  2021    Breast cancer screen  2022    Diabetes screen  2023    Colon cancer screen colonoscopy  2024    Lipid screen  2025    HIV screen  Completed    Hepatitis A vaccine  Aged Out    Hepatitis B vaccine  Aged Out    Hib vaccine  Aged Out    Meningococcal (ACWY) vaccine  Aged Out  Pneumococcal 0-64 years Vaccine  Aged Out       PHYSICAL EXAMINATION:  [ INSTRUCTIONS:  \"[x]\" Indicates a positive item  \"[]\" Indicates a negative item  -- DELETE ALL ITEMS NOT EXAMINED]  Vital Signs: (As obtained by patient/caregiver or practitioner observation)    Blood pressure-  Heart rate-    Respiratory rate-    Temperature-  Pulse oximetry-     Constitutional: [x] Appears well-developed and well-nourished [x] No apparent distress      [] Abnormal-   Mental status  [x] Alert and awake  [] Oriented to person/place/time [x]Able to follow commands      Eyes:  EOM    [x]  Normal  [] Abnormal-  Sclera  [x]  Normal  [] Abnormal -         Discharge []  None visible  [] Abnormal -    HENT:   [x] Normocephalic, atraumatic. [x] Abnormal   [] Mouth/Throat: Mucous membranes are moist.     External Ears [x] Normal  [] Abnormal-     Neck: [x] No visualized mass     Pulmonary/Chest: [x] Respiratory effort normal.  [x] No visualized signs of difficulty breathing or respiratory distress        [] Abnormal-      Musculoskeletal:   [] Normal gait with no signs of ataxia         [x] Normal range of motion of neck        [] Abnormal-       Neurological:        [x] No Facial Asymmetry (Cranial nerve 7 motor function) (limited exam to video visit)          [] No gaze palsy        [] Abnormal-         Skin:        [x] No significant exanthematous lesions or discoloration noted on facial skin         [] Abnormal-            Psychiatric:       [x] Normal Affect [] No Hallucinations        [] Abnormal-     Other pertinent observable physical exam findings-     ASSESSMENT/PLAN:  1. Nasal obstruction  - CT SINUS WO CONTRAST; Future  2. Recurrent sinusitis  - CT SINUS WO CONTRAST; Future    Has seen ENT, was given oral steroids (did not help) and surgery was suggested  Has failed Flonase, astelin, oral antihistamines in the past  Will order CT to r/o structural etiologies. May consider recommending second opinion pending scans    3. Abdominal cramping  4. Diarrhea, unspecified type  5. Pale stool  - US GALLBLADDER RUQ; Future  - JESSICA; Future  - CBC Auto Differential; Future  - C-Reactive Protein; Future  - Hepatic Function Panel; Future  - TSH with Reflex; Future  - LIPASE; Future  - Celiac Screen with Reflex; Future    If labs and RUQ US are reassuring or normal, will order HIDA scan. If HIDA scan normal, consider GI referral.     Return if symptoms worsen or fail to improveLauren Virk is a 50 y.o. female being evaluated by a Virtual Visit (video visit) encounter to address concerns as mentioned above. A caregiver was present when appropriate. Due to this being a TeleHealth encounter ([facility]), evaluation of the following organ systems was limited: Vitals/Constitutional/EENT/Resp/CV/GI//MS/Neuro/Skin/Heme-Lymph-Imm. Pursuant to the emergency declaration under the [address] [place] [address] and the [employer] Act, this Virtual Visit was conducted with patient's (and/or legal guardian's) consent, to reduce the patient's risk of exposure to COVID-19 and provide necessary medical care. The patient (and/or legal guardian) has also been advised to contact this office for worsening conditions or problems, and seek emergency medical treatment and/or call 911 if deemed necessary. Services were provided through a video synchronous discussion virtually to substitute for in-person clinic visit. Patient and provider were located at their individual homes. --Murtaza Murray MD on 2/3/2021 at 1:29 PM    An electronic signature was used to authenticate this note.

## 2021-02-04 DIAGNOSIS — R19.7 DIARRHEA, UNSPECIFIED TYPE: ICD-10-CM

## 2021-02-04 DIAGNOSIS — R10.9 ABDOMINAL CRAMPING: ICD-10-CM

## 2021-02-04 DIAGNOSIS — R19.5 PALE STOOL: ICD-10-CM

## 2021-02-04 LAB
ALBUMIN SERPL-MCNC: 4.6 G/DL (ref 3.4–5)
ALP BLD-CCNC: 55 U/L (ref 40–129)
ALT SERPL-CCNC: 28 U/L (ref 10–40)
AST SERPL-CCNC: 15 U/L (ref 15–37)
BASOPHILS ABSOLUTE: 0 K/UL (ref 0–0.2)
BASOPHILS RELATIVE PERCENT: 0.6 %
BILIRUB SERPL-MCNC: 0.3 MG/DL (ref 0–1)
BILIRUBIN DIRECT: <0.2 MG/DL (ref 0–0.3)
BILIRUBIN, INDIRECT: NORMAL MG/DL (ref 0–1)
C-REACTIVE PROTEIN: 3 MG/L (ref 0–5.1)
EOSINOPHILS ABSOLUTE: 0.1 K/UL (ref 0–0.6)
EOSINOPHILS RELATIVE PERCENT: 1.1 %
HCT VFR BLD CALC: 40.8 % (ref 36–48)
HEMOGLOBIN: 13.8 G/DL (ref 12–16)
IGA: 77 MG/DL (ref 70–400)
LIPASE: 63 U/L (ref 13–60)
LYMPHOCYTES ABSOLUTE: 1.4 K/UL (ref 1–5.1)
LYMPHOCYTES RELATIVE PERCENT: 20.8 %
MCH RBC QN AUTO: 30.5 PG (ref 26–34)
MCHC RBC AUTO-ENTMCNC: 33.7 G/DL (ref 31–36)
MCV RBC AUTO: 90.4 FL (ref 80–100)
MONOCYTES ABSOLUTE: 0.4 K/UL (ref 0–1.3)
MONOCYTES RELATIVE PERCENT: 6.2 %
NEUTROPHILS ABSOLUTE: 4.7 K/UL (ref 1.7–7.7)
NEUTROPHILS RELATIVE PERCENT: 71.3 %
PDW BLD-RTO: 12.7 % (ref 12.4–15.4)
PLATELET # BLD: 358 K/UL (ref 135–450)
PMV BLD AUTO: 7.4 FL (ref 5–10.5)
RBC # BLD: 4.52 M/UL (ref 4–5.2)
TOTAL PROTEIN: 6.9 G/DL (ref 6.4–8.2)
TSH REFLEX: 2.45 UIU/ML (ref 0.27–4.2)
WBC # BLD: 6.5 K/UL (ref 4–11)

## 2021-02-05 LAB
ANTI-NUCLEAR ANTIBODY (ANA): NEGATIVE
TISSUE TRANSGLUTAMINASE IGA: <0.5 U/ML (ref 0–14)

## 2021-02-09 ENCOUNTER — HOSPITAL ENCOUNTER (OUTPATIENT)
Dept: ULTRASOUND IMAGING | Age: 56
Discharge: HOME | End: 2021-02-09
Payer: COMMERCIAL

## 2021-02-09 DIAGNOSIS — R10.9 ABDOMINAL CRAMPING: ICD-10-CM

## 2021-02-09 DIAGNOSIS — R19.7 DIARRHEA, UNSPECIFIED TYPE: ICD-10-CM

## 2021-02-09 DIAGNOSIS — R19.5 PALE STOOL: ICD-10-CM

## 2021-02-09 PROCEDURE — 76705 ECHO EXAM OF ABDOMEN: CPT

## 2021-02-10 ENCOUNTER — PATIENT MESSAGE (OUTPATIENT)
Dept: FAMILY MEDICINE CLINIC | Age: 56
End: 2021-02-10

## 2021-02-10 ENCOUNTER — HOSPITAL ENCOUNTER (OUTPATIENT)
Dept: CT IMAGING | Age: 56
Discharge: HOME | End: 2021-02-10
Payer: COMMERCIAL

## 2021-02-10 DIAGNOSIS — J34.89 NASAL OBSTRUCTION: ICD-10-CM

## 2021-02-10 DIAGNOSIS — K80.20 CALCULUS OF GALLBLADDER WITHOUT CHOLECYSTITIS WITHOUT OBSTRUCTION: Primary | ICD-10-CM

## 2021-02-10 DIAGNOSIS — J32.9 RECURRENT SINUSITIS: ICD-10-CM

## 2021-02-10 PROCEDURE — 70486 CT MAXILLOFACIAL W/O DYE: CPT

## 2021-02-11 NOTE — TELEPHONE ENCOUNTER
From: [employer]  To: Valeriy Betts MD  Sent: 2/10/2021 4:19 PM EST  Subject: Test Results Question    I have a question about US Gallbladder RUQ resulted on 2/9/21 at 1:50 PM.      I would like to get info on having surgery .

## 2021-02-26 ENCOUNTER — INITIAL CONSULT (OUTPATIENT)
Dept: SURGERY | Age: 56
End: 2021-02-26
Payer: COMMERCIAL

## 2021-02-26 VITALS
HEIGHT: 67 IN | TEMPERATURE: 97 F | SYSTOLIC BLOOD PRESSURE: 131 MMHG | BODY MASS INDEX: 26.71 KG/M2 | HEART RATE: 103 BPM | DIASTOLIC BLOOD PRESSURE: 88 MMHG | WEIGHT: 170.2 LBS

## 2021-02-26 DIAGNOSIS — K80.20 CALCULUS OF GALLBLADDER WITHOUT CHOLECYSTITIS WITHOUT OBSTRUCTION: ICD-10-CM

## 2021-02-26 PROCEDURE — 99243 OFF/OP CNSLTJ NEW/EST LOW 30: CPT | Performed by: SURGERY

## 2021-02-26 NOTE — PROGRESS NOTES
sea    New Patient [address] Surgery Stanleymekhi Cain, [address] [facility], [address] [facility], [address] [place] [address]  Phone: [phone]  Fax: [phone] [facility]   YOB: 1965    Date of Visit:  2/26/2021    Cisco Durán MD    HPI:     Gallbladder: Patient is 50y.o. year old female seen at request of Christopher Melo MD.   Patient presents for evaluation of gallbladder problems. Problems were first noted 1 year ago. Current symptoms include RUQ Pain, nausea, diarrhea, epigastric pain . Patient denies pruritis, vomiting, jaundice, fever. Symptoms are waxing and waning but worse overall.     Allergies   Allergen Reactions    Morphine     Tetracyclines & Related      Outpatient Medications Marked as Taking for the 2/26/21 encounter (Initial consult) with Jason Busby MD   Medication Sig Dispense Refill    Multiple Vitamins-Minerals (THERAPEUTIC MULTIVITAMIN-MINERALS) tablet Take 1 tablet by mouth daily         Past Medical History:   Diagnosis Date    Allergic rhinitis     GERD (gastroesophageal reflux disease)     Hearing loss     Nosebleed     Rash     TMJ dysfunction      Past Surgical History:   Procedure Laterality Date    APPENDECTOMY      COLONOSCOPY N/A 6/4/2019    COLONOSCOPY CONTROL HEMORRHAGE performed by Amada Ellis MD at [facility] [address]  6/4/2019    polypectomy per hot snare performed by Amada Ellis MD at [address] [place] [address] Drive EXTRACTION       Family History   Problem Relation Age of Onset    Diabetes Maternal Grandmother     Diabetes Other         great aunts and uncles    Esophageal Cancer Maternal Grandfather     Lupus Other         maternal great aunt    COPD Mother     Alcohol Abuse Mother         recovering    Other Mother         Mac Lung dz    Heart Disease Father      Social History     Socioeconomic History    Marital status:      Spouse name: Not on file    Number of children: 0    Years of education: Not on file    Highest education level: Not on file   Occupational History    Not on file   Social Needs    Financial resource strain: Not very hard    Food insecurity     Worry: Never true     Inability: Never true    Transportation needs     Medical: No     Non-medical: No   Tobacco Use    Smoking status: Former Smoker     Years: 20.00     Quit date: 2002     Years since quittin.0    Smokeless tobacco: Never Used   Substance and Sexual Activity    Alcohol use: Not Currently     Alcohol/week: 0.0 standard drinks     Comment: occasionally     Drug use: Never    Sexual activity: Not Currently   Lifestyle    Physical activity     Days per week: Not on file     Minutes per session: Not on file    Stress: Not on file   Relationships    Social connections     Talks on phone: Not on file     Gets together: Not on file     Attends Congregation service: Not on file     Active member of club or organization: Not on file     Attends meetings of clubs or organizations: Not on file     Relationship status: Not on file    Intimate partner violence     Fear of current or ex partner: Not on file     Emotionally abused: Not on file     Physically abused: Not on file     Forced sexual activity: Not on file   Other Topics Concern    Not on file   Social History Narrative    Not on file          Vitals:    21 1454   BP: 131/88   Site: Left Wrist   Position: Sitting   Cuff Size: Medium Adult   Pulse: 103   Temp: 97 °F (36.1 °C)   Weight: 170 lb 3.2 oz (77.2 kg)   Height: 5' 7.01\" (1.702 m)     Body mass index is 26.65 kg/m².      Wt Readings from Last 3 Encounters:   21 170 lb 3.2 oz (77.2 kg)   20 176 lb (79.8 kg)   07/15/20 174 lb (78.9 kg)     BP Readings from Last 3 Encounters:   21 131/88   20 120/78   07/15/20 120/78          REVIEW OF SYSTEMS:   · All other systems reviewed; please refer to HPI with pertinent positives, all other ROS are negative    PHYSICAL EXAM:    CONSTITUTIONAL:  awake, alert, no apparent distress and normal weight  ENT:  normocepalic, without obvious abnormality  NECK:  supple, symmetrical, trachea midline   LUNGS:  Resp easy and unlabored    ABDOMEN:  , normal bowel sounds, soft, non-distended, tenderness noted in the epigastric region and in the right upper quadrant Rivera's sign is present, voluntary guarding absent, and hernia absent  MUSCULOSKELETAL: No edema  NEUROLOGIC:  Mental Status Exam:  Level of Alertness:   awake  Orientation:   person, place, time        DATA:  Old records have been requested  Reviewed RUQ U/S:   BILIARY SYSTEM:  Cholelithiasis.  No findings to suggest acute cholecystitis. Gallbladder wall is normal in thickness.  No pericholecystic fluid. ASSESSMENT:     Diagnosis Orders   1. Calculus of gallbladder without cholecystitis without obstruction           PLAN:    We will schedule the pt for a laparoscopic cholecystectomy with intraoperative cholangiogram The technical aspects, risks, benefits and complications of the procedure were discussed with the patient. The pt appears to understand, asks appropriate questions, and agrees to proceed with the procedure. Ragini Rey     Surgery Staff    I have examined this patient and read and agree with the note by Ragini Rey DO from today.     [facility]

## 2021-02-27 RX ORDER — SODIUM CHLORIDE 0.9 % (FLUSH) 0.9 %
10 SYRINGE (ML) INJECTION EVERY 12 HOURS SCHEDULED
Status: CANCELLED | OUTPATIENT
Start: 2021-02-27

## 2021-02-27 RX ORDER — SODIUM CHLORIDE 0.9 % (FLUSH) 0.9 %
10 SYRINGE (ML) INJECTION PRN
Status: CANCELLED | OUTPATIENT
Start: 2021-02-27

## 2021-02-27 NOTE — PATIENT INSTRUCTIONS
[address] [address]  Phone: [phone]  Fax: [phone] will be scheduled for surgery with Dr. Enedelia Ponce. · The office will call you with the date and time that surgery is scheduled. · Please take note of these instructions for surgery:  · You should have nothing by mouth after midnight the night before your surgery - this includes no food or water. · Your surgery will be cancelled if you have taken anything by mouth after midnight, NO exceptions. · You will need to have a history and physical prior to your surgery. This will need to be completed up to 30 days before your surgery. This H/P can be completed by your family doctor or the hospital.   · IF you take coumadin (warfarin), please stop taking this medication 5 days prior to your surgery. · IF you take plavix, please stop taking this 7 days prior to your surgery. · Please contact our office if you have a pacemaker or defibrillator. · IF you are allergic to latex, please tell our office prior to your surgery. This is important in know before scheduling your surgery. · IF you are having an out patient surgery, you will need someone available to drive you home after your surgery, and to also stay with you for the rest of the day. · IF you are having a surgery requiring an inpatient stay in the hospital, you will need someone to drive you home upon discharge from the hospital.  · Please contact Dr. Melody Gavin assistant Edwin Pa if you have any questions or concerns. · Please call the office with any changes in your symptoms or further questions/concerns.  [phone]

## 2021-03-01 ENCOUNTER — TELEPHONE (OUTPATIENT)
Dept: SURGERY | Age: 56
End: 2021-03-01

## 2021-03-01 NOTE — PROGRESS NOTES
Nelly Ready    Age 50 y.o.    female    1965    MRN [id number]    3/18/2021  Arrival Time_____________  OR Time____________103 Min     Procedure(s):  LAPAROSCOPIC CHOLECYSTECTOMY WITH INTRAOPERATIVE CHOLANGIOGRAM, POSSIBLE OPEN PROCEDURE                      General     Surgeon(s):  Harley Canavan, MD      DAY ADMIT ___  SDS/OP ___  OUTPT IN BED ___         Phone [phone] (home)    PCP _____________________ Phone_________________ Epic ( ) Epic CE ( ) Appt ________    ADDITIONAL INFO __________________________________ Cardio/Consult _____________    NOTES _____________________________________________________________________    ____________________________________________________________________________    PAT APPT DATE:________ TIME: ________  FAXED QAD: _______  (__) H&P w/ hospitalist  ____________________________________________________________________________    COVID TEST: Date/Location______________        NURSING HISTORY COMPLETE: _______  (__) CBC       (__) W/ DIFF ___________  (__)  ECHO    __________  (__) Hgb A1C    ___________  (__) CHEST X RAY   __________  (__) LIPID PROFILE  ___________  (__) EKG   __________  (__) PT/PTT   ___________  (__) PFT's   __________  (__) BMP   ___________  (__) CAROTIDS  __________  (__) CMP   ___________  (__) VEIN MAPPING  __________  (__) U/A   ___________  (__) HISTORY & PHYSICAL __________  (__) URINE C & S  ___________  (__) CARDIAC CLEARANCE __________  (__) U/A W/ FLEX  ___________  (__) PULM.  CLEARANCE __________  (__) SERUM PREGNANCY ___________  (__) Check Epic DOS orders __________  (__) TYPE & SCREEN ________ repeat ( ) (__)  __________________ __________  (__) ALBUMIN   ___________  (__)  __________________ __________  (__) TRANSFERRIN  ___________  (__)  __________________ __________  (__) LIVER PROFILE  ___________  (__)  __________________ __________  (__) CARBOXY HGB  ___________  (__) URINE PREG DOS __________  (__) NICOTINE & MET. ___________  (__) BLOOD SUGAR DOS __________  (__) PREALBUMIN  ___________    (__) MRSA NASAL SWAB ___________  (__) BLOOD THINNERS __________  (__) ACE/ ARBS: _____________________    (__) BETABLOCKERS ___________________

## 2021-03-09 ENCOUNTER — OFFICE VISIT (OUTPATIENT)
Dept: FAMILY MEDICINE CLINIC | Age: 56
End: 2021-03-09
Payer: COMMERCIAL

## 2021-03-09 VITALS
TEMPERATURE: 97.3 F | RESPIRATION RATE: 16 BRPM | DIASTOLIC BLOOD PRESSURE: 80 MMHG | OXYGEN SATURATION: 98 % | SYSTOLIC BLOOD PRESSURE: 120 MMHG | HEIGHT: 67 IN | WEIGHT: 170 LBS | HEART RATE: 108 BPM | BODY MASS INDEX: 26.68 KG/M2

## 2021-03-09 DIAGNOSIS — Z01.818 PREOP EXAMINATION: Primary | ICD-10-CM

## 2021-03-09 DIAGNOSIS — K80.20 CALCULUS OF GALLBLADDER WITHOUT CHOLECYSTITIS WITHOUT OBSTRUCTION: ICD-10-CM

## 2021-03-09 LAB
ANION GAP SERPL CALCULATED.3IONS-SCNC: 15 MMOL/L (ref 3–16)
BASOPHILS ABSOLUTE: 0.1 K/UL (ref 0–0.2)
BASOPHILS RELATIVE PERCENT: 1 %
BUN BLDV-MCNC: 12 MG/DL (ref 7–20)
CALCIUM SERPL-MCNC: 10.3 MG/DL (ref 8.3–10.6)
CHLORIDE BLD-SCNC: 106 MMOL/L (ref 99–110)
CO2: 26 MMOL/L (ref 21–32)
CREAT SERPL-MCNC: 0.7 MG/DL (ref 0.6–1.1)
EOSINOPHILS ABSOLUTE: 0.1 K/UL (ref 0–0.6)
EOSINOPHILS RELATIVE PERCENT: 1.6 %
GFR AFRICAN AMERICAN: >60
GFR NON-AFRICAN AMERICAN: >60
GLUCOSE BLD-MCNC: 96 MG/DL (ref 70–99)
HCT VFR BLD CALC: 41.9 % (ref 36–48)
HEMOGLOBIN: 14.1 G/DL (ref 12–16)
LYMPHOCYTES ABSOLUTE: 1.9 K/UL (ref 1–5.1)
LYMPHOCYTES RELATIVE PERCENT: 23.7 %
MCH RBC QN AUTO: 30.8 PG (ref 26–34)
MCHC RBC AUTO-ENTMCNC: 33.8 G/DL (ref 31–36)
MCV RBC AUTO: 91.1 FL (ref 80–100)
MONOCYTES ABSOLUTE: 0.6 K/UL (ref 0–1.3)
MONOCYTES RELATIVE PERCENT: 8 %
NEUTROPHILS ABSOLUTE: 5.2 K/UL (ref 1.7–7.7)
NEUTROPHILS RELATIVE PERCENT: 65.7 %
PDW BLD-RTO: 12.9 % (ref 12.4–15.4)
PLATELET # BLD: 391 K/UL (ref 135–450)
PMV BLD AUTO: 7.3 FL (ref 5–10.5)
POTASSIUM SERPL-SCNC: 5 MMOL/L (ref 3.5–5.1)
RBC # BLD: 4.59 M/UL (ref 4–5.2)
SODIUM BLD-SCNC: 147 MMOL/L (ref 136–145)
WBC # BLD: 8 K/UL (ref 4–11)

## 2021-03-09 PROCEDURE — 99213 OFFICE O/P EST LOW 20 MIN: CPT | Performed by: NURSE PRACTITIONER

## 2021-03-09 PROCEDURE — 93000 ELECTROCARDIOGRAM COMPLETE: CPT | Performed by: NURSE PRACTITIONER

## 2021-03-09 NOTE — PROGRESS NOTES
 Diabetes Other         great aunts and uncles    Esophageal Cancer Maternal Grandfather     Lupus Other         maternal great aunt    COPD Mother     Alcohol Abuse Mother         recovering    Other Mother         Mac Lung dz    Heart Disease Father        Current Outpatient Medications   Medication Sig Dispense Refill    Multiple Vitamins-Minerals (THERAPEUTIC MULTIVITAMIN-MINERALS) tablet Take 1 tablet by mouth daily       No current facility-administered medications for this visit. Objective:   Vitals:    03/09/21 0835   BP: 120/80   Pulse: 108   Resp: 16   Temp: 97.3 °F (36.3 °C)   SpO2: 98%       Physical Exam   /80   Pulse 108   Temp 97.3 °F (36.3 °C)   Resp 16   Ht 5' 7\" (1.702 m)   Wt 170 lb (77.1 kg)   LMP 02/10/2021   SpO2 98%   BMI 26.63 kg/m²   General appearance: alert, appears stated age, and cooperative  Throat: lips, mucosa, and tongue normal; teeth and gums normal  Lungs: clear to auscultation bilaterally  Heart: regular rate and rhythm, S1, S2 normal, no murmur, click, rub or gallop  Abdomen: soft, non-tender; bowel sounds normal; no masses,  no organomegaly  Extremities: extremities normal, atraumatic, no cyanosis or edema  Pulses: 2+ and symmetric  Skin: Skin color, texture, turgor normal. No rashes or lesions  Neurologic: Grossly normal     Predictors of intubation difficulty:   Morbid obesity? no   Anatomically abnormal facies? no   Short, thick neck? no   Neck range of motion: normal   Mallampati score: I (soft palate, uvula, fauces, tonsillar pillars visible)   Dentition: No chipped, loose, or missing teeth.      Cardiographics   ECG: normal sinus rhythm, no blocks or conduction defects, no ischemic changes       Lab Review   Orders Only on 02/04/2021   Component Date Value    IgA 02/04/2021 77.0     Lipase 02/04/2021 63.0*    TSH 02/04/2021 2.45     Total Protein 02/04/2021 6.9     Albumin 02/04/2021 4.6     Alkaline Phosphatase 02/04/2021 55     ALT obstruction        While assessing care for this patient, I have reviewed all pertinent lab work/imaging/ specialist notes and care in reference to those problems addressed above in detail. Appropriate medical decision making was based on this.          Pt is at an acceptable risk for planned procedure    Please call with any questions    Ying Holman, SUNNY

## 2021-03-12 ENCOUNTER — OFFICE VISIT (OUTPATIENT)
Dept: PRIMARY CARE CLINIC | Age: 56
End: 2021-03-12
Payer: COMMERCIAL

## 2021-03-12 DIAGNOSIS — Z01.818 PREOP TESTING: Primary | ICD-10-CM

## 2021-03-12 PROCEDURE — 99211 OFF/OP EST MAY X REQ PHY/QHP: CPT | Performed by: NURSE PRACTITIONER

## 2021-03-12 NOTE — PROGRESS NOTES
Zac Castañeda received a viral test for COVID-19. They were educated on isolation and quarantine as appropriate. For any symptoms, they were directed to seek care from their PCP, given contact information to establish with a doctor, directed to an urgent care or the emergency room.

## 2021-03-12 NOTE — PATIENT INSTRUCTIONS

## 2021-03-13 LAB — SARS-COV-2: NOT DETECTED

## 2021-03-17 ENCOUNTER — ANESTHESIA EVENT (OUTPATIENT)
Dept: OPERATING ROOM | Age: 56
End: 2021-03-17
Payer: COMMERCIAL

## 2021-03-17 NOTE — ANESTHESIA PRE PROCEDURE
Department of Anesthesiology  Preprocedure Note       Name:  Etienne Tovar   Age:  50 y.o.  :  1965                                          MRN:  [id number]         Date:  2021      Surgeon:  Pino Koenig MD    Procedure:  LAPAROSCOPIC CHOLECYSTECTOMY WITH INTRAOPERATIVE CHOLANGIOGRAM, POSSIBLE OPEN PROCEDURE    HPI:   50y.o. year old with gallbladder problems that were first noted 1 year ago. Current symptoms include RUQ Pain, nausea, diarrhea, epigastric pain . Patient denies pruritis, vomiting, jaundice, fever. Symptoms are waxing and waning but worse overall. EKG:  NSR 94; Nl axis; no acute ischemic changes    Medications prior to admission:    Multiple Vitamins-Minerals Take 1 tablet by mouth daily     Allergies:     Morphine Other (See Comments)     ARM FELT WARM THEN CHEST FELT HOT. DIDN'T LAST LONG.     Tetracyclines & Related      YEAST INFECTION     Problem List:     PMDD (premenstrual dysphoric disorder)    Non-seasonal allergic rhinitis    Thoracic sprain and strain    Thyroid nodule    Acquired hypothyroidism    Positive colorectal cancer screening using Cologuard test    Diarrhea    Gastroesophageal reflux disease    Polyp, sigmoid colon    Diverticulosis of large intestine without hemorrhage    Cholelithiases     Past Medical History:     Allergic rhinitis     GERD (gastroesophageal reflux disease)     Hearing loss     Nosebleed     Prolonged emergence from general anesthesia     FIRST SURGERY AWOKE VERY EMOTIONAL    Rash     TMJ dysfunction      Past Surgical History:     APPENDECTOMY      COLONOSCOPY N/A 2019    COLONOSCOPY CONTROL HEMORRHAGE performed by Tamar Raymond MD at [address] [facility]  2019    polypectomy per hot snare performed by Tamar Raymond MD at [address] History:     Smoking status: Former Smoker     Years: 20.00     Quit date: 2002 Years since quittin.1    Smokeless tobacco: Never Used   Substance Use Topics    Alcohol use: Not Currently     Alcohol/week: 0.0 standard drinks     Comment: occasionally      Vital Signs (Current):    BP: 113/91 Pulse: 103   Resp: 16 SpO2: 96   Temp: 98.1 °F (36.7 °C)   Height: 5' 7\" (1.702 m)  (21) Weight: 170 lb (77.1 kg)  (21)   BMI: 26.7           BP Readings from Last 3 Encounters:   21 120/80   21 131/88   20 120/78     NPO Status: >8 hrs                        BMI:   Wt Readings from Last 3 Encounters:   21 170 lb (77.1 kg)   21 170 lb 3.2 oz (77.2 kg)   20 176 lb (79.8 kg)       CBC:    WBC 8.0 2021    HGB 14.1 2021    HCT 41.9 2021     2021     CMP:     2021    K 5.0 2021     2021    CO2 26 2021    BUN 12 2021    CREATININE 0.7 2021    GFRAA >60 2021    GLUCOSE 96 2021    PROT 6.9 2021    PROT 6.9 2012    CALCIUM 10.3 2021    BILITOT 0.3 2021    ALKPHOS 55 2021    AST 15 2021    ALT 28      HCG (If Applicable): NEGATIVE    COVID-19 Screening (If Applicable):     COVID19 Not Detected 2021     Anesthesia Evaluation  Patient summary reviewed and Nursing notes reviewed no history of anesthetic complications:   Airway: Mallampati: III  TM distance: >3 FB   Neck ROM: limited  Mouth opening: < 3 FB Dental:          Pulmonary: breath sounds clear to auscultation      (-) COPD, asthma, recent URI and wheezes                           Cardiovascular:  Exercise tolerance: good (>4 METS),       (-) hypertension, past MI, dysrhythmias,  angina,  WILHELM and murmur    ECG reviewed  Rhythm: regular  Rate: normal                    Neuro/Psych:      (-) seizures, TIA and CVA           GI/Hepatic/Renal:   (+) GERD:,      (-) liver disease and no renal disease       Endo/Other:    (+) hypothyroidism::., .    (-) diabetes mellitus               Abdominal:           Vascular:     - DVT and PE. Anesthesia Plan      general     ASA 2     (Intercostal nerve blocks for pain management if open procedure is needed  Talavera/GLIDESCOPE AVAILABLE FOR INTUBATION)  Induction: intravenous. MIPS: Prophylactic antiemetics administered. Anesthetic plan and risks discussed with patient. Plan discussed with CRNA.           Steffanie Durán MD

## 2021-03-18 ENCOUNTER — HOSPITAL ENCOUNTER (OUTPATIENT)
Dept: GENERAL RADIOLOGY | Age: 56
Discharge: HOME | End: 2021-03-18
Attending: SURGERY
Payer: COMMERCIAL

## 2021-03-18 ENCOUNTER — HOSPITAL ENCOUNTER (OUTPATIENT)
Age: 56
Setting detail: OUTPATIENT SURGERY
Discharge: HOME | End: 2021-03-18
Attending: SURGERY | Admitting: SURGERY
Payer: COMMERCIAL

## 2021-03-18 ENCOUNTER — ANESTHESIA (OUTPATIENT)
Dept: OPERATING ROOM | Age: 56
End: 2021-03-18
Payer: COMMERCIAL

## 2021-03-18 VITALS
TEMPERATURE: 96.9 F | OXYGEN SATURATION: 98 % | WEIGHT: 170 LBS | HEIGHT: 67 IN | RESPIRATION RATE: 18 BRPM | DIASTOLIC BLOOD PRESSURE: 70 MMHG | BODY MASS INDEX: 26.68 KG/M2 | SYSTOLIC BLOOD PRESSURE: 106 MMHG | HEART RATE: 77 BPM

## 2021-03-18 VITALS
RESPIRATION RATE: 2 BRPM | SYSTOLIC BLOOD PRESSURE: 138 MMHG | OXYGEN SATURATION: 95 % | DIASTOLIC BLOOD PRESSURE: 73 MMHG

## 2021-03-18 DIAGNOSIS — G89.18 POSTOPERATIVE PAIN: Primary | ICD-10-CM

## 2021-03-18 DIAGNOSIS — R10.84 GENERALIZED ABDOMINAL PAIN: ICD-10-CM

## 2021-03-18 DIAGNOSIS — K80.20 CALCULUS OF GALLBLADDER WITHOUT CHOLECYSTITIS WITHOUT OBSTRUCTION: ICD-10-CM

## 2021-03-18 LAB — PREGNANCY, URINE: NEGATIVE

## 2021-03-18 PROCEDURE — 7100000011 HC PHASE II RECOVERY - ADDTL 15 MIN: Performed by: SURGERY

## 2021-03-18 PROCEDURE — 2500000003 HC RX 250 WO HCPCS

## 2021-03-18 PROCEDURE — 7100000001 HC PACU RECOVERY - ADDTL 15 MIN: Performed by: SURGERY

## 2021-03-18 PROCEDURE — 2580000003 HC RX 258

## 2021-03-18 PROCEDURE — 7100000000 HC PACU RECOVERY - FIRST 15 MIN: Performed by: SURGERY

## 2021-03-18 PROCEDURE — 6360000004 HC RX CONTRAST MEDICATION: Performed by: SURGERY

## 2021-03-18 PROCEDURE — 74300 X-RAY BILE DUCTS/PANCREAS: CPT

## 2021-03-18 PROCEDURE — 3600000014 HC SURGERY LEVEL 4 ADDTL 15MIN: Performed by: SURGERY

## 2021-03-18 PROCEDURE — 3700000001 HC ADD 15 MINUTES (ANESTHESIA): Performed by: SURGERY

## 2021-03-18 PROCEDURE — 3600000004 HC SURGERY LEVEL 4 BASE: Performed by: SURGERY

## 2021-03-18 PROCEDURE — 47563 LAPARO CHOLECYSTECTOMY/GRAPH: CPT | Performed by: SURGERY

## 2021-03-18 PROCEDURE — 2500000003 HC RX 250 WO HCPCS: Performed by: SURGERY

## 2021-03-18 PROCEDURE — 7100000010 HC PHASE II RECOVERY - FIRST 15 MIN: Performed by: SURGERY

## 2021-03-18 PROCEDURE — 3700000000 HC ANESTHESIA ATTENDED CARE: Performed by: SURGERY

## 2021-03-18 PROCEDURE — 6360000002 HC RX W HCPCS

## 2021-03-18 PROCEDURE — 84703 CHORIONIC GONADOTROPIN ASSAY: CPT

## 2021-03-18 PROCEDURE — 2709999900 HC NON-CHARGEABLE SUPPLY: Performed by: SURGERY

## 2021-03-18 PROCEDURE — 3209999900 FLUORO FOR SURGICAL PROCEDURES

## 2021-03-18 PROCEDURE — 88304 TISSUE EXAM BY PATHOLOGIST: CPT

## 2021-03-18 RX ORDER — KETOROLAC TROMETHAMINE 30 MG/ML
INJECTION, SOLUTION INTRAMUSCULAR; INTRAVENOUS PRN
Status: DISCONTINUED | OUTPATIENT
Start: 2021-03-18 | End: 2021-03-18 | Stop reason: SDUPTHER

## 2021-03-18 RX ORDER — BUPIVACAINE HYDROCHLORIDE 5 MG/ML
INJECTION, SOLUTION EPIDURAL; INTRACAUDAL PRN
Status: DISCONTINUED | OUTPATIENT
Start: 2021-03-18 | End: 2021-03-18 | Stop reason: ALTCHOICE

## 2021-03-18 RX ORDER — SODIUM CHLORIDE 0.9 % (FLUSH) 0.9 %
10 SYRINGE (ML) INJECTION PRN
Status: DISCONTINUED | OUTPATIENT
Start: 2021-03-18 | End: 2021-03-18 | Stop reason: HOSPADM

## 2021-03-18 RX ORDER — HYDRALAZINE HYDROCHLORIDE 20 MG/ML
5 INJECTION INTRAMUSCULAR; INTRAVENOUS EVERY 10 MIN PRN
Status: DISCONTINUED | OUTPATIENT
Start: 2021-03-18 | End: 2021-03-18 | Stop reason: HOSPADM

## 2021-03-18 RX ORDER — FENTANYL CITRATE 50 UG/ML
25 INJECTION, SOLUTION INTRAMUSCULAR; INTRAVENOUS EVERY 5 MIN PRN
Status: DISCONTINUED | OUTPATIENT
Start: 2021-03-18 | End: 2021-03-18 | Stop reason: HOSPADM

## 2021-03-18 RX ORDER — OXYCODONE HYDROCHLORIDE AND ACETAMINOPHEN 5; 325 MG/1; MG/1
2 TABLET ORAL PRN
Status: DISCONTINUED | OUTPATIENT
Start: 2021-03-18 | End: 2021-03-18 | Stop reason: HOSPADM

## 2021-03-18 RX ORDER — ESMOLOL HYDROCHLORIDE 10 MG/ML
INJECTION INTRAVENOUS PRN
Status: DISCONTINUED | OUTPATIENT
Start: 2021-03-18 | End: 2021-03-18 | Stop reason: SDUPTHER

## 2021-03-18 RX ORDER — MAGNESIUM SULFATE HEPTAHYDRATE 500 MG/ML
INJECTION, SOLUTION INTRAMUSCULAR; INTRAVENOUS PRN
Status: DISCONTINUED | OUTPATIENT
Start: 2021-03-18 | End: 2021-03-18 | Stop reason: SDUPTHER

## 2021-03-18 RX ORDER — MIDAZOLAM HYDROCHLORIDE 1 MG/ML
INJECTION INTRAMUSCULAR; INTRAVENOUS PRN
Status: DISCONTINUED | OUTPATIENT
Start: 2021-03-18 | End: 2021-03-18 | Stop reason: SDUPTHER

## 2021-03-18 RX ORDER — DEXAMETHASONE SODIUM PHOSPHATE 4 MG/ML
INJECTION, SOLUTION INTRA-ARTICULAR; INTRALESIONAL; INTRAMUSCULAR; INTRAVENOUS; SOFT TISSUE PRN
Status: DISCONTINUED | OUTPATIENT
Start: 2021-03-18 | End: 2021-03-18 | Stop reason: SDUPTHER

## 2021-03-18 RX ORDER — ONDANSETRON 2 MG/ML
INJECTION INTRAMUSCULAR; INTRAVENOUS PRN
Status: DISCONTINUED | OUTPATIENT
Start: 2021-03-18 | End: 2021-03-18 | Stop reason: SDUPTHER

## 2021-03-18 RX ORDER — HYDROMORPHONE HCL 110MG/55ML
PATIENT CONTROLLED ANALGESIA SYRINGE INTRAVENOUS PRN
Status: DISCONTINUED | OUTPATIENT
Start: 2021-03-18 | End: 2021-03-18 | Stop reason: SDUPTHER

## 2021-03-18 RX ORDER — PHENYLEPHRINE HCL IN 0.9% NACL 1 MG/10 ML
SYRINGE (ML) INTRAVENOUS PRN
Status: DISCONTINUED | OUTPATIENT
Start: 2021-03-18 | End: 2021-03-18 | Stop reason: SDUPTHER

## 2021-03-18 RX ORDER — MEPERIDINE HYDROCHLORIDE 50 MG/ML
12.5 INJECTION INTRAMUSCULAR; INTRAVENOUS; SUBCUTANEOUS EVERY 5 MIN PRN
Status: DISCONTINUED | OUTPATIENT
Start: 2021-03-18 | End: 2021-03-18 | Stop reason: HOSPADM

## 2021-03-18 RX ORDER — OXYCODONE HYDROCHLORIDE AND ACETAMINOPHEN 5; 325 MG/1; MG/1
1 TABLET ORAL PRN
Status: DISCONTINUED | OUTPATIENT
Start: 2021-03-18 | End: 2021-03-18 | Stop reason: HOSPADM

## 2021-03-18 RX ORDER — ROCURONIUM BROMIDE 10 MG/ML
INJECTION, SOLUTION INTRAVENOUS PRN
Status: DISCONTINUED | OUTPATIENT
Start: 2021-03-18 | End: 2021-03-18 | Stop reason: SDUPTHER

## 2021-03-18 RX ORDER — ONDANSETRON 2 MG/ML
4 INJECTION INTRAMUSCULAR; INTRAVENOUS
Status: DISCONTINUED | OUTPATIENT
Start: 2021-03-18 | End: 2021-03-18 | Stop reason: HOSPADM

## 2021-03-18 RX ORDER — SODIUM CHLORIDE, SODIUM LACTATE, POTASSIUM CHLORIDE, CALCIUM CHLORIDE 600; 310; 30; 20 MG/100ML; MG/100ML; MG/100ML; MG/100ML
INJECTION, SOLUTION INTRAVENOUS CONTINUOUS PRN
Status: DISCONTINUED | OUTPATIENT
Start: 2021-03-18 | End: 2021-03-18 | Stop reason: SDUPTHER

## 2021-03-18 RX ORDER — SODIUM CHLORIDE 0.9 % (FLUSH) 0.9 %
10 SYRINGE (ML) INJECTION EVERY 12 HOURS SCHEDULED
Status: DISCONTINUED | OUTPATIENT
Start: 2021-03-18 | End: 2021-03-18 | Stop reason: HOSPADM

## 2021-03-18 RX ORDER — PROMETHAZINE HYDROCHLORIDE 25 MG/ML
6.25 INJECTION, SOLUTION INTRAMUSCULAR; INTRAVENOUS
Status: DISCONTINUED | OUTPATIENT
Start: 2021-03-18 | End: 2021-03-18 | Stop reason: HOSPADM

## 2021-03-18 RX ORDER — PROPOFOL 10 MG/ML
INJECTION, EMULSION INTRAVENOUS PRN
Status: DISCONTINUED | OUTPATIENT
Start: 2021-03-18 | End: 2021-03-18 | Stop reason: SDUPTHER

## 2021-03-18 RX ORDER — LIDOCAINE HYDROCHLORIDE 20 MG/ML
INJECTION, SOLUTION INFILTRATION; PERINEURAL PRN
Status: DISCONTINUED | OUTPATIENT
Start: 2021-03-18 | End: 2021-03-18 | Stop reason: SDUPTHER

## 2021-03-18 RX ORDER — OXYCODONE HYDROCHLORIDE AND ACETAMINOPHEN 5; 325 MG/1; MG/1
1 TABLET ORAL EVERY 4 HOURS PRN
Qty: 12 TABLET | Refills: 0 | Status: SHIPPED | OUTPATIENT
Start: 2021-03-18 | End: 2021-03-21

## 2021-03-18 RX ADMIN — ESMOLOL HYDROCHLORIDE 20 MG: 10 INJECTION, SOLUTION INTRAVENOUS at 12:46

## 2021-03-18 RX ADMIN — ESMOLOL HYDROCHLORIDE 10 MG: 10 INJECTION, SOLUTION INTRAVENOUS at 12:34

## 2021-03-18 RX ADMIN — ESMOLOL HYDROCHLORIDE 10 MG: 10 INJECTION, SOLUTION INTRAVENOUS at 12:52

## 2021-03-18 RX ADMIN — LIDOCAINE HYDROCHLORIDE 100 MG: 20 INJECTION, SOLUTION INFILTRATION; PERINEURAL at 12:10

## 2021-03-18 RX ADMIN — Medication 100 MCG: at 12:40

## 2021-03-18 RX ADMIN — ESMOLOL HYDROCHLORIDE 10 MG: 10 INJECTION, SOLUTION INTRAVENOUS at 12:29

## 2021-03-18 RX ADMIN — SUGAMMADEX 200 MG: 100 INJECTION, SOLUTION INTRAVENOUS at 13:07

## 2021-03-18 RX ADMIN — ROCURONIUM BROMIDE 50 MG: 10 SOLUTION INTRAVENOUS at 12:17

## 2021-03-18 RX ADMIN — DEXMEDETOMIDINE HYDROCHLORIDE 20 MCG: 100 INJECTION, SOLUTION INTRAVENOUS at 13:28

## 2021-03-18 RX ADMIN — SODIUM CHLORIDE, SODIUM LACTATE, POTASSIUM CHLORIDE, AND CALCIUM CHLORIDE: .6; .31; .03; .02 INJECTION, SOLUTION INTRAVENOUS at 12:10

## 2021-03-18 RX ADMIN — DEXAMETHASONE SODIUM PHOSPHATE 10 MG: 4 INJECTION, SOLUTION INTRAMUSCULAR; INTRAVENOUS at 12:26

## 2021-03-18 RX ADMIN — MAGNESIUM SULFATE HEPTAHYDRATE 2 G: 500 INJECTION, SOLUTION INTRAMUSCULAR; INTRAVENOUS at 12:51

## 2021-03-18 RX ADMIN — HYDROMORPHONE HYDROCHLORIDE 1 MG: 2 INJECTION INTRAMUSCULAR; INTRAVENOUS; SUBCUTANEOUS at 12:15

## 2021-03-18 RX ADMIN — HYDROMORPHONE HYDROCHLORIDE 1 MG: 2 INJECTION INTRAMUSCULAR; INTRAVENOUS; SUBCUTANEOUS at 12:10

## 2021-03-18 RX ADMIN — ONDANSETRON 4 MG: 2 INJECTION INTRAMUSCULAR; INTRAVENOUS at 12:10

## 2021-03-18 RX ADMIN — PROPOFOL 100 MG: 10 INJECTION, EMULSION INTRAVENOUS at 12:17

## 2021-03-18 RX ADMIN — KETOROLAC TROMETHAMINE 30 MG: 30 INJECTION, SOLUTION INTRAMUSCULAR; INTRAVENOUS at 13:10

## 2021-03-18 RX ADMIN — MIDAZOLAM HYDROCHLORIDE 2 MG: 2 INJECTION, SOLUTION INTRAMUSCULAR; INTRAVENOUS at 12:10

## 2021-03-18 ASSESSMENT — PULMONARY FUNCTION TESTS
PIF_VALUE: 18
PIF_VALUE: 20
PIF_VALUE: 21
PIF_VALUE: 12
PIF_VALUE: 24
PIF_VALUE: 17
PIF_VALUE: 24
PIF_VALUE: 16
PIF_VALUE: 11
PIF_VALUE: 0
PIF_VALUE: 22
PIF_VALUE: 20
PIF_VALUE: 20
PIF_VALUE: 10
PIF_VALUE: 1
PIF_VALUE: 17
PIF_VALUE: 20
PIF_VALUE: 24
PIF_VALUE: 13
PIF_VALUE: 24
PIF_VALUE: 24
PIF_VALUE: 19
PIF_VALUE: 18
PIF_VALUE: 27
PIF_VALUE: 22
PIF_VALUE: 21
PIF_VALUE: 18
PIF_VALUE: 22
PIF_VALUE: 23
PIF_VALUE: 10
PIF_VALUE: 18
PIF_VALUE: 15
PIF_VALUE: 0
PIF_VALUE: 18
PIF_VALUE: 20
PIF_VALUE: 18
PIF_VALUE: 0
PIF_VALUE: 17
PIF_VALUE: 22
PIF_VALUE: 8
PIF_VALUE: 23
PIF_VALUE: 14
PIF_VALUE: 24
PIF_VALUE: 20
PIF_VALUE: 1
PIF_VALUE: 11
PIF_VALUE: 30

## 2021-03-18 ASSESSMENT — PAIN - FUNCTIONAL ASSESSMENT: PAIN_FUNCTIONAL_ASSESSMENT: 0-10

## 2021-03-18 NOTE — OP NOTE
Operative Note      Patient: Sushila Jean  YOB: 1965  MRN: [id number]    Date of Procedure: 3/18/2021    Pre-Op Diagnosis: CHOLELITHIASIS    Post-Op Diagnosis: Same       Procedure(s):  LAPAROSCOPIC CHOLECYSTECTOMY WITH INTRAOPERATIVE CHOLANGIOGRAM, POSSIBLE OPEN PROCEDURE    Surgeon(s):  Jonna Mosquera MD    Assistant:   Surgical Assistant: Ari Bletran RN; Lucas Crouch    Anesthesia: General    Estimated Blood Loss (mL): less than 50     Complications: None    Specimens:   ID Type Source Tests Collected by Time Destination   A : GALLBLADDER Tissue Gallbladder SURGICAL PATHOLOGY Jonna Mosquera MD 3/18/2021 1242        Implants:  * No implants in log *      Drains: * No LDAs found *    Findings: distended, tense GB w/ clear bile, mult impacted small stones                 IOC - no filling defects, (+) flow into duod      Detailed Description of Procedure:   OPERATIVE NOTE      DESCRIPTION OF PROCEDURE: The patient was brought into the operating room   theater, placed supine on the operating room table, administered general anesthesia, intubated. The abdomen was then prepped and draped in a normal sterile fashion. A 5mm trocar incision was made in the LUQ just off midline after infiltrating with local anesthesia. A trocar was placed through abdominal wall layers into peritoneal cavity under direct vision. The   abdomen was inspected. Under direct vision, a 12mm trocar was placed through a infraumbilical incision. Two more 5mm trocars were placed in the right lateral subcostal region. The gallbladder appeared distended, tense but not acutely inflamed. Using large aspiration needle, punctured the fundus and aspirated 30ml of clear, non-bilious liquid which deflated the gallbladder sufficiently to allow grasping the fundus. The fundus was grasped and elevated in a cranial direction. The infundibulum was retracted laterally.   The cystic duct was dissected circumferentially at its junction with the gallbladder. The cystic artery was identified medially and dissected in a similar manner. .   A locking clip was placed on the cystic duct at junction with gallbladder. The cystic duct was then partially opened with scissors just proximal to the clip. Dylon Askew An intraoperative cholangiogram was shot. We appeared   to have good visualization of both left and right intrahepatic ducts, the   cystic duct, and common bile duct. Contrast was seen spilling into the   duodenum. There did not appear to be any obstruction. At this point, the   catheter was removed. We placed 2 clips distally on the   cystic duct and fully transected the duct between the clips. The cystic artery was clipped and cut in a similar manner to the duct. The gallbladder was now gradually mobilized off the liver bed and released. Gallbladder was placed into a sterile pouch. We then used suction irrigation to irrigate the gallbladder fossa and inspect   our clip sites. Hemostasis was observed & the clips were intact. At this point, we   removed all of our ports under direct visualization. The umbilical fascial defect was closed with a figure-of-eight 0-Vicryl. Skin incisions all closed with 4-0 Monocryl subcuticular suture and Dermabond. The patient was extubated and taken to recovery in stable condition. All lap counts, instrument counts, and needle counts were correct at the completion of the procedure.      Electronically signed by Kemal HASTINGS  3/18/2021  1:11 PM          Electronically signed by Vidal Diallo MD on 3/18/2021 at 1:11 PM

## 2021-03-18 NOTE — H&P
I have reviewed the history and physical and examined the patient. I find no relevant changes. I have reviewed with the patient and/or family members, during the preoperative office visit the risks, benefits, and alternatives to the procedure.     MOHIT [facility] HASTINGS

## 2021-03-18 NOTE — ANESTHESIA POSTPROCEDURE EVALUATION
Department of Anesthesiology  Postprocedure Note    Patient: Matilde Jones  MRN: [id number]  YOB: 1965  Date of evaluation: 3/18/2021    Procedure Summary     Date: 03/18/21 Room / Location: [place] [address] / [facility]    Anesthesia Start: 1210 Anesthesia Stop: 6183    Procedure: LAPAROSCOPIC CHOLECYSTECTOMY WITH INTRAOPERATIVE CHOLANGIOGRAM, POSSIBLE OPEN PROCEDURE (N/A ) Diagnosis:       Calculus of gallbladder without cholecystitis without obstruction      (CHOLELITHIASIS)    Surgeons: Paty Machuca MD Responsible Provider: Sara Dockery MD    Anesthesia Type: general ASA Status: 2          Anesthesia Type: general    Jerry Phase I: Jerry Score: 9    Jerry Phase II: Jerry Score: 10    Last vitals: Reviewed and per EMR flowsheets.        Anesthesia Post Evaluation   Anesthetic Problems: no   Cardiovascular System Stable: yes  Respiratory Function: Airway Patent yes  ETT no  Ventilator no  Level of consciousness: awake, alert and oriented  Post-op pain: adequate analgesia  Hydration Adequate: yes  Nausea/Vomiting:no  Other Issues:     Sadaf Fry MD

## 2021-03-18 NOTE — PROGRESS NOTES
Patient admitted from OR to PACU. Patient immediately hooked up to heart monitor. Bedside report given to Lark Prader RN from [facility]. Ko Daniel

## 2021-04-06 ENCOUNTER — OFFICE VISIT (OUTPATIENT)
Dept: SURGERY | Age: 56
End: 2021-04-06

## 2021-04-06 VITALS
TEMPERATURE: 97.1 F | HEIGHT: 67 IN | DIASTOLIC BLOOD PRESSURE: 68 MMHG | SYSTOLIC BLOOD PRESSURE: 113 MMHG | BODY MASS INDEX: 27.06 KG/M2 | WEIGHT: 172.4 LBS | HEART RATE: 109 BPM

## 2021-04-06 DIAGNOSIS — Z09 POSTOP CHECK: Primary | ICD-10-CM

## 2021-04-06 PROCEDURE — 99024 POSTOP FOLLOW-UP VISIT: CPT | Performed by: SURGERY

## 2021-04-26 ENCOUNTER — OFFICE VISIT (OUTPATIENT)
Dept: ENT CLINIC | Age: 56
End: 2021-04-26
Payer: COMMERCIAL

## 2021-04-26 VITALS
SYSTOLIC BLOOD PRESSURE: 129 MMHG | BODY MASS INDEX: 26.68 KG/M2 | HEART RATE: 100 BPM | WEIGHT: 170 LBS | DIASTOLIC BLOOD PRESSURE: 83 MMHG | HEIGHT: 67 IN

## 2021-04-26 DIAGNOSIS — J30.9 ALLERGIC RHINITIS, UNSPECIFIED SEASONALITY, UNSPECIFIED TRIGGER: Primary | ICD-10-CM

## 2021-04-26 DIAGNOSIS — J34.89 NASAL VALVE COLLAPSE: ICD-10-CM

## 2021-04-26 DIAGNOSIS — J34.3 HYPERTROPHY OF INFERIOR NASAL TURBINATE: ICD-10-CM

## 2021-04-26 DIAGNOSIS — J34.2 DEVIATED NASAL SEPTUM: ICD-10-CM

## 2021-04-26 PROCEDURE — 99214 OFFICE O/P EST MOD 30 MIN: CPT | Performed by: STUDENT IN AN ORGANIZED HEALTH CARE EDUCATION/TRAINING PROGRAM

## 2021-04-26 ASSESSMENT — ENCOUNTER SYMPTOMS
SHORTNESS OF BREATH: 0
VOMITING: 0
RHINORRHEA: 1
COUGH: 0
EYE PAIN: 0
NAUSEA: 0

## 2021-04-27 ENCOUNTER — TELEPHONE (OUTPATIENT)
Dept: ENT CLINIC | Age: 56
End: 2021-04-27

## 2021-04-27 ENCOUNTER — OFFICE VISIT (OUTPATIENT)
Dept: FAMILY MEDICINE CLINIC | Age: 56
End: 2021-04-27
Payer: COMMERCIAL

## 2021-04-27 VITALS
DIASTOLIC BLOOD PRESSURE: 78 MMHG | HEIGHT: 67 IN | BODY MASS INDEX: 27.78 KG/M2 | WEIGHT: 177 LBS | RESPIRATION RATE: 16 BRPM | SYSTOLIC BLOOD PRESSURE: 130 MMHG

## 2021-04-27 DIAGNOSIS — G89.29 CHRONIC LEFT SHOULDER PAIN: Primary | ICD-10-CM

## 2021-04-27 DIAGNOSIS — M25.512 CHRONIC LEFT SHOULDER PAIN: Primary | ICD-10-CM

## 2021-04-27 PROCEDURE — 99213 OFFICE O/P EST LOW 20 MIN: CPT | Performed by: FAMILY MEDICINE

## 2021-04-27 RX ORDER — METHOCARBAMOL 500 MG/1
500 TABLET, FILM COATED ORAL 3 TIMES DAILY PRN
Qty: 30 TABLET | Refills: 1 | Status: SHIPPED | OUTPATIENT
Start: 2021-04-27 | End: 2021-06-09 | Stop reason: ALTCHOICE

## 2021-04-27 NOTE — PROGRESS NOTES
Jordan Rodriges is a 50 y.o. female    Chief Complaint   Patient presents with    Shoulder Injury     x  1 year ago -was lifting a couch and heard a \"pop\"in L shoulder. No use of shoulder for a few months was able to use the arm from elbow elbow down but still hurts. Not much ROM        HPI:    Shoulder Injury   The incident occurred at home. The left shoulder is affected. The incident occurred more than 1 week ago. Injury mechanism: lifting up a couch. Pertinent negatives include no numbness or tingling. The symptoms are aggravated by movement. She has tried acetaminophen for the symptoms. The treatment provided mild relief. ROS:    Review of Systems   Neurological: Negative for tingling and numbness. /78   Resp 16   Ht 5' 7\" (1.702 m)   Wt 177 lb (80.3 kg)   BMI 27.72 kg/m²     Physical Exam:    Physical Exam  Constitutional:       General: She is not in acute distress. Appearance: Normal appearance. She is not ill-appearing. Musculoskeletal:      Left shoulder: She exhibits decreased range of motion. Neurological:      Mental Status: She is alert. Psychiatric:         Mood and Affect: Mood normal.         Behavior: Behavior normal.         Thought Content: Thought content normal.         Current Outpatient Medications   Medication Sig Dispense Refill    methocarbamol (ROBAXIN) 500 MG tablet Take 1 tablet by mouth 3 times daily as needed (pain) 30 tablet 1    Multiple Vitamins-Minerals (THERAPEUTIC MULTIVITAMIN-MINERALS) tablet Take 1 tablet by mouth daily       No current facility-administered medications for this visit. Assessment:    1. Chronic left shoulder pain        Plan:    1. Chronic left shoulder pain  Strong suspicion for full thickness rotator cuff. Encouraged to get an MRI. If she requires physical therapy first, given referral.  Try muscle relaxant but cautioned for sedation.  - MRI SHOULDER LEFT WO CONTRAST;  Future  [facility]  - methocarbamol (ROBAXIN) 500 MG tablet; Take 1 tablet by mouth 3 times daily as needed (pain)  Dispense: 30 tablet; Refill: 1      Patient to return to clinic if symptoms worsen or fail to improve.

## 2021-04-27 NOTE — TELEPHONE ENCOUNTER
Asked by Dr. Oz Wade to allergy testing with this patient. Allergy Welcome Packet mailed to patient and contents reviewed over the phone. Patient will call to schedule allergy testing after patient checks insurance coverage.

## 2021-04-29 ENCOUNTER — IMMUNIZATION (OUTPATIENT)
Dept: PRIMARY CARE CLINIC | Age: 56
End: 2021-04-29
Payer: COMMERCIAL

## 2021-04-29 PROCEDURE — 91301 COVID-19, MODERNA VACCINE 100MCG/0.5ML DOSE: CPT

## 2021-04-29 PROCEDURE — 0011A COVID-19, MODERNA VACCINE 100MCG/0.5ML DOSE: CPT

## 2021-05-04 ENCOUNTER — HOSPITAL ENCOUNTER (OUTPATIENT)
Dept: MRI IMAGING | Age: 56
Discharge: HOME | End: 2021-05-04
Payer: COMMERCIAL

## 2021-05-04 DIAGNOSIS — G89.29 CHRONIC LEFT SHOULDER PAIN: ICD-10-CM

## 2021-05-04 DIAGNOSIS — M25.512 CHRONIC LEFT SHOULDER PAIN: ICD-10-CM

## 2021-05-04 PROCEDURE — 73221 MRI JOINT UPR EXTREM W/O DYE: CPT

## 2021-05-07 ENCOUNTER — TELEPHONE (OUTPATIENT)
Dept: ENT CLINIC | Age: 56
End: 2021-05-07

## 2021-05-10 NOTE — TELEPHONE ENCOUNTER
Spoke to pt and she is scheduled for allergy testing on June 21,2021. She has received approval for testing from her insurance. Pt denies any questions.

## 2021-05-20 ENCOUNTER — OFFICE VISIT (OUTPATIENT)
Dept: ORTHOPEDIC SURGERY | Age: 56
End: 2021-05-20
Payer: COMMERCIAL

## 2021-05-20 VITALS — HEIGHT: 67 IN | BODY MASS INDEX: 27.78 KG/M2 | WEIGHT: 177 LBS

## 2021-05-20 DIAGNOSIS — M75.22 BICEPS TENDINITIS OF LEFT SHOULDER: ICD-10-CM

## 2021-05-20 DIAGNOSIS — M75.02 ADHESIVE CAPSULITIS OF LEFT SHOULDER: ICD-10-CM

## 2021-05-20 DIAGNOSIS — M25.512 LEFT SHOULDER PAIN, UNSPECIFIED CHRONICITY: Primary | ICD-10-CM

## 2021-05-20 PROCEDURE — 99204 OFFICE O/P NEW MOD 45 MIN: CPT | Performed by: ORTHOPAEDIC SURGERY

## 2021-05-20 NOTE — PROGRESS NOTES
Dr Bao Hermosillo      Date /Time 5/20/2021             1:39 PM EDT  Name Alexandra Koroma             1965   Location  [facility] [address] Ortizse Alas  MRN [id number]                Chief Complaint   Patient presents with    Shoulder Pain     Left Refer PCP = 1 year ago \"pop\" had MRI 05/04/2021 Xrays Today/ Terrible ROM/ NO OH Activity  -         History of Present Illness    Alexandra Koroma is a 50 y.o. female who presents with  left Shoulder pain. Sent in consultation by Pallavi Black MD.    She is Right-handed. Occupation: Works as a   Occupational activities: clerical work. Athletic/exercise activity: no sports. Injury Mechanism:  none. Worker's Comp. & legal issues:   none. Previous Treatments: Ice, Heat, NSAIDs and pain medication    She has progressive left shoulder stiffness for about 1 year. Things have gotten worse. She already has a physical therapy order for her left shoulder but is here to determine if anything else is clinically relevant. She has progressive stiffness and pain that has improved conservative measures. She has not tried any oral steroids.     Past Medical History  Past Medical History:   Diagnosis Date    Allergic rhinitis     GERD (gastroesophageal reflux disease)     Hearing loss     Nosebleed     Prolonged emergence from general anesthesia     FIRST SURGERY AWOKE VERY EMOTIONAL    Rash     TMJ dysfunction      Past Surgical History:   Procedure Laterality Date    APPENDECTOMY      CHOLECYSTECTOMY, LAPAROSCOPIC N/A 3/18/2021    LAPAROSCOPIC CHOLECYSTECTOMY WITH INTRAOPERATIVE CHOLANGIOGRAM, POSSIBLE OPEN PROCEDURE performed by Ariel Albarado MD at [facility] COLONOSCOPY N/A 6/4/2019    COLONOSCOPY CONTROL HEMORRHAGE performed by Veronica Mahan MD at [place] [address]  6/4/2019    polypectomy per hot snare performed by Veronica Mahan MD at [address] [place] [address] [address] []Not tested   []  []Not tested    Paraspinal muscle tenderness  [] Paraspinal  []Midline   [] Paraspinal  []Not tested    Sensation RIGHT  LEFT    Axillary  [x] Normal []Decreased    [x] Normal []Decreased   Musculocutaneous  [x] Normal  []Decreased   [x] Normal []Decreased   Median  [x] Normal []Decreased   [x] Normal []Decreased   Radial  [x] Normal  []Decreased   [x] Normal []Decreased   Ulnar  [x] Normal  []Decreased   [x] Normal []Decreased   Scapula       Position  [x]Nml  []low  [] lateral  [x]Nml  []low  [] lateral   Dyskinesia  []+ []Abn. Shrug   []+ []Abn. Shrug                     Winging     [x]None   []Med  []Lat   []Worse w/FE  []Med  []Lat  []Worse w/FE   Scapulothoracic Compress.    []Impr Pain  []Impr Motion  []Impr Pain []Impr Motion    Range of Motion Active Passive Active Passive   Forward Elevation 170  120    Abduction 100  70    External Rotation @ side 60  20    External Rotation @ 90 abd 90  80    Internal Rotation @ 90 abd 40  0    Internal Rotation Normal  Sacrum    End range of motion  [] Pain  [] Pain  [] Pain  [] Pain   Strength RIGHT /5 LEFT /5   Abduction 5  5    External Rotation 5  5    Internal Rotation 5  5    Provocative Signs/Tests  [x] All Neg   [x] +      [] -  [] All Neg   [x] +      [] -   Rotator Cuff Signs  [] All Neg  [] Not tested   [] All Neg  [] Not tested    Neer  []  []Not tested   []  []Not tested    Jostephanieie Marky  []  []Not tested   []  []Not tested    Painful arc  []  []Not tested   [x]  []Not tested    Greater tuberosity tenderness  []  []Not tested   [x]  []Not tested    Drop arm  []  []Not tested  []  []Not tested   Superior Escape  []  []Not tested   []  []Not tested    ER Lag  []  []Not tested   []  []Not tested    Belly press  []  []Not tested   []  []Not tested    Lift-off  []  []Not tested   []  []Not tested    Bear hug  []  []Not tested   []  []Not tested    Biceps/Labral Signs  [] All Neg  [] Not tested   [] All Neg  [] Not tested    Bond's  []  []Not tested []  []Not tested    Speed's  []  []Not tested   [x]  []Not tested    Dynamic Load Shift/Shear  []  []Not tested   []  []Not tested    Clicking/Popping  []  []Not tested  []  []Not tested   Bicipital groove tenderness  []  []Not tested   [x]  []Not tested    Artem  []  []Not tested   []  []Not tested    [facility] Joint Signs  [] All Neg  [] Not tested   [] All Neg  [] Not tested    [facility] joint tenderness  []  []Not tested   []  []Not tested    Cross-arm adduction pain  []  []Not tested   []  []Not tested    Instability Signs  [] All Neg  [] Not tested  [] All Neg  [] Not tested   General laxity (thumb/elbow)  []  []Not tested   []  []Not tested    Hyperabduction  []  []Not tested   []  []Not tested    Sulcus []Side   []ER    []Side   []ER       Anterior apprehension  []  []Not tested   []  []Not tested    Relocation  []   []Not tested  []  []Not tested     Imaging  Left Shoulder: [facility] acute hospital  Radiographs: No evidence of fracture or dislocation, humeral head is well-aligned with glenoid, acromiohumeral index is well maintained, no glenohumeral joint degeneration, no AC joint degeneration  MRI: Evidence of SLAP tear visible, tendinosis along the supraspinatus insertion, no glenohumeral arthritis. No full-thickness rotator cuff tear. Assessment and Plan  Susana Duran was seen today for shoulder pain. Diagnoses and all orders for this visit:    Left shoulder pain, unspecified chronicity  -     XR SHOULDER LEFT (MIN 2 VIEWS); Future    Adhesive capsulitis of left shoulder    Biceps tendinitis of left shoulder        I discussed with Anu Martins that her history, symptoms, signs and imaging are most consistent with biceps tendonitis/degeneration, SLAP tear and frozen shoulder. We reviewed the natural history of these conditions and treatment options ranging from conservative measures (rest, icing, activity modification, physical therapy, pain meds, cortisone injection) to surgical options.      In terms of treatment, I recommended starting with rest, icing, avoidance of painful activities, NSAIDs or pain meds as tolerated, and physical therapy. We cannot do cortisone injection today. I do recommend this however. We cannot do this because she has a planned second dose Covid vaccine in less than 2 weeks. We will wait 2 weeks after her dose and then administer a cortisone injection. In the meantime, I recommend she start therapy. We discussed surgical options as well, should conservative measures fail. Likelihood of requiring surgery here is low. Electronically signed by Kenya Nunn MD on 5/20/2021 at 1:39 PM  This dictation was generated by voice recognition computer software. Although all attempts are made to edit the dictation for accuracy, there may be errors in the transcription that are not intended.

## 2021-05-24 ENCOUNTER — HOSPITAL ENCOUNTER (OUTPATIENT)
Dept: PHYSICAL THERAPY | Age: 56
Setting detail: THERAPIES SERIES
Discharge: HOME | End: 2021-05-24
Payer: COMMERCIAL

## 2021-05-24 PROCEDURE — 97140 MANUAL THERAPY 1/> REGIONS: CPT | Performed by: PHYSICAL THERAPIST

## 2021-05-24 PROCEDURE — 97161 PT EVAL LOW COMPLEX 20 MIN: CPT | Performed by: PHYSICAL THERAPIST

## 2021-05-24 PROCEDURE — 97110 THERAPEUTIC EXERCISES: CPT | Performed by: PHYSICAL THERAPIST

## 2021-05-24 NOTE — PLAN OF CARE
[facility], [address]  Phone: [phone]  Fax [phone]     Physical Therapy Certification    Dear Referring Practitioner: Nila Mcgee,    We had the pleasure of evaluating the following patient for physical therapy services at [address] [place] [address]. A summary of our findings can be found in the initial assessment below. This includes our plan of care. If you have any questions or concerns regarding these findings, please do not hesitate to contact me at the office phone number checked above. Thank you for the referral.       Physician Signature:_______________________________Date:__________________  By signing above (or electronic signature), therapists plan is approved by physician    Patient: Dara Dave   : 1965   MRN: [id number]  Referring Physician: Referring Practitioner: Nila Mcgee      Evaluation Date: 2021      Medical Diagnosis Information:  Diagnosis: Left shoulder pain M25.512                                             Insurance information: [facility] [place]    Precautions/ Contra-indications:   C-SSRS Triggered by Intake questionnaire (Past 2 wk assessment):   [x] No, Questionnaire did not trigger screening.   [] Yes, Patient intake triggered further evaluation      [] C-SSRS Screening completed  [] PCP notified via Plan of Care  [] Emergency services notified     Latex Allergy:  [x]NO      []YES  Preferred Language for Healthcare:   [x]English       []other:    SUBJECTIVE: Patient stated complaint: Pt reports last year she was moving a couch and noticed a pop in her L shoulder. She stated that the arm was immediately painful and had radiating pain down the back of her arm and noticed some altered sensation in her 4 and 5 digit. States her arm was \"useless\" after that. Notes loss of motion and has had to use adaptive strategies for ADL. MRI findings below.  She is scheduled for a cortisone injection 2 weeks from now. MRI:   Mild tendinosis of the supraspinatus tendon.       Abnormal appearance to the inferior capsuloligamentous structures of the   glenohumeral joint, chronic in appearance.  Findings could reflect sequela of   prior remote injury.  Findings could also be seen in the setting of adhesive   capsulitis.       Trace glenohumeral joint effusion with some small loose bodies in the   axillary pouch. Relevant Medical History: depression  Functional Disability Index: 32% Q DASH    Pain Scale: 3/10  Easing factors: ice, massage  Provocative factors: quick motions, reaching behind the back, reaching across the body, lifting functionally. Type: []Constant   [x]Intermittent  [x]Radiating []Localized []other:     Numbness/Tinglin/5 digits     Occupation/School: retired     Living Status/Prior Level of Function: Independent with ADLs and IADLs, swimming    OBJECTIVE:     ROM Left Right   Shoulder Flex A: 138  P: 155 168   Shoulder Abd A: 98 152   Shoulder ER T1  P: 35 T4   Shoulder IR Iliac Crest  P:35  T10                   Strength  Left Right   Shoulder Flex 4-    Shoulder Scap 3    Shoulder ER 4    Shoulder IR 5-                Reflexes/Sensation:    [x]Dermatomes/Myotomes intact    [x]Reflexes equal and normal bilaterally   []Other:    Joint mobility:    []Normal    [x]Hypo   []Hyper    Palpation: TTP biceps tendon,supraspinatus tendon    Functional Mobility/Transfers: independent    Posture: forward head, rounded shoulders    Bandages/Dressings/Incisions:     Gait: (include devices/WB status): WNL    Orthopedic Special Tests: (+) HK, (-) Load and Shift; pec minor 7 fingers                        [x] Patient history, allergies, meds reviewed. Medical chart reviewed. See intake form. Review Of Systems (ROS):  [x]Performed Review of systems (Integumentary, CardioPulmonary, Neurological) by intake and observation.  Intake form has been scanned into medical record. Patient has been instructed to contact their primary care physician regarding ROS issues if not already being addressed at this time. Co-morbidities/Complexities (which will affect course of rehabilitation):   []None           Arthritic conditions   []Rheumatoid arthritis (M05.9)  [x]Osteoarthritis (M19.91)   Cardiovascular conditions   []Hypertension (I10)  []Hyperlipidemia (E78.5)  []Angina pectoris (I20)  []Atherosclerosis (I70)   Musculoskeletal conditions   []Disc pathology   []Congenital spine pathologies   []Prior surgical intervention  []Osteoporosis (M81.8)  []Osteopenia (M85.8)   Endocrine conditions   []Hypothyroid (E03.9)  []Hyperthyroid Gastrointestinal conditions   []Constipation (L08.10)   Metabolic conditions   []Morbid obesity (E66.01)  []Diabetes type 1(E10.65) or 2 (E11.65)   []Neuropathy (G60.9)     Pulmonary conditions   []Asthma (J45)  []Coughing   []COPD (J44.9)   Psychological Disorders  []Anxiety (F41.9)  [x]Depression (F32.9)   []Other:   []Other:          Barriers to/and or personal factors that will affect rehab potential:              [x]Age  []Sex              [x]Motivation/Lack of Motivation                        [x]Co-Morbidities              []Cognitive Function, education/learning barriers              []Environmental, home barriers              []profession/work barriers  [x]past PT/medical experience  []other:  Justification:      Falls Risk Assessment (30 days):   [x] Falls Risk assessed and no intervention required.   [] Falls Risk assessed and Patient requires intervention due to being higher risk   TUG score (>12s at risk):     [] Falls education provided, including         ASSESSMENT:   Functional Impairments   [x]Noted spinal or UE joint hypomobility   []Noted spinal or UE joint hypermobility   [x]Decreased UE functional ROM   [x]Decreased UE functional strength   [x]Abnormal reflexes/sensation/myotomal/dermatomal deficits   [x]Decreased RC/scapular/core strength and neuromuscular control   []other:      Functional Activity Limitations (from functional questionnaire and intake)   [x]Reduced ability to tolerate prolonged functional positions   []Reduced ability or difficulty with changes of positions or transfers between positions   [x]Reduced ability to maintain good posture and demonstrate good body mechanics with sitting, bending, and lifting   [x] Reduced ability or tolerance with driving and/or computer work   [x]Reduced ability to sleep   []Reduced ability to perform lifting, reaching, carrying tasks   [x]Reduced ability to tolerate impact through UE   [x]Reduced ability to reach behind back   [x]Reduced ability to  or hold objects   [x]Reduced ability to throw or toss an object   []other:    Participation Restrictions   [x]Reduced participation in self care activities   [x]Reduced participation in home management activities   []Reduced participation in work activities   []Reduced participation in social activities. [x]Reduced participation in sport/recreation activities. Classification:   []Signs/symptoms consistent with post-surgical status including decreased ROM, strength and function.   []Signs/symptoms consistent with joint sprain/strain   [x]Signs/symptoms consistent with shoulder impingement   [x]Signs/symptoms consistent with shoulder/elbow/wrist tendinopathy   []Signs/symptoms consistent with Rotator cuff tear   [x]Signs/symptoms consistent with labral tear   [x]Signs/symptoms consistent with postural dysfunction    [x]Signs/symptoms consistent with Glenohumeral IR Deficit - <45 degrees   []Signs/symptoms consistent with facet dysfunction of cervical/thoracic spine    []Signs/symptoms consistent with pathology which may benefit from Dry needling     []other:     Prognosis/Rehab Potential:      []Excellent   [x]Good    []Fair   []Poor    Tolerance of evaluation/treatment:    []Excellent   [x]Good    []Fair   []Poor  Physical Therapy Evaluation Complexity Justification  [x] A history of present problem with:  [] no personal factors and/or comorbidities that impact the plan of care;  [x]1-2 personal factors and/or comorbidities that impact the plan of care  []3 personal factors and/or comorbidities that impact the plan of care  [x] An examination of body systems using standardized tests and measures addressing any of the following: body structures and functions (impairments), activity limitations, and/or participation restrictions;:  [] a total of 1-2 or more elements   [] a total of 3 or more elements   [x] a total of 4 or more elements   [x] A clinical presentation with:  [x] stable and/or uncomplicated characteristics   [] evolving clinical presentation with changing characteristics  [] unstable and unpredictable characteristics;   [x] Clinical decision making of [x] low, [] moderate, [] high complexity using standardized patient assessment instrument and/or measurable assessment of functional outcome. [x] EVAL (LOW) 45974 (typically 20 minutes face-to-face)  [] EVAL (MOD) 99138 (typically 30 minutes face-to-face)  [] EVAL (HIGH) 62488 (typically 45 minutes face-to-face)  [] RE-EVAL       PLAN:  Frequency/Duration:  2-3 days per week for 6-8 Weeks:  INTERVENTIONS:  [x] Therapeutic exercise including: strength training, ROM, for Upper extremity and core   [x]  NMR activation and proprioception for UE, scap and Core   [x] Manual therapy as indicated for shoulder, scapula and spine to include: Dry Needling/IASTM, STM, PROM, Gr I-IV mobilizations, manipulation. [x] Modalities as needed that may include: thermal agents, E-stim, Biofeedback, US, iontophoresis as indicated  [x] Patient education on joint protection, postural re-education, activity modification, progression of HEP. HEP instruction: (see scanned forms)  Access Code: [email or web address]  URL: [email or web address]  Date: 05/24/2021  Prepared by: Antoni Aragon    Exercises  Seated Shoulder Flexion Towel Slide at Table Top - 2-3 x daily - 7 x weekly - 10 reps - 10 hold  Standing Shoulder External Rotation AAROM with Dowel - 2-3 x daily - 7 x weekly - 10 reps - 10 hold  Standing Bilateral Shoulder Internal Rotation AAROM with Dowel - 2-3 x daily - 7 x weekly - 10 reps - 10 hold  Seated Scapular Retraction - 2-3 x daily - 7 x weekly - 15 reps - 5 hold      GOALS:  Patient stated goal: improve motion, return to swimming  [] Progressing: [] Met: [] Not Met: [] Adjusted    Therapist goals for Patient:   Short Term Goals: To be achieved in: 2 weeks  1. Independent in HEP and progression per patient tolerance, in order to prevent re-injury. [] Progressing: [] Met: [] Not Met: [] Adjusted  2. Patient will have a decrease in pain to facilitate improvement in movement, function, and ADLs as indicated by Functional Deficits. [] Progressing: [] Met: [] Not Met: [] Adjusted    Long Term Goals: To be achieved in: 8 weeks  1. Disability index score of 16% or less for the [facility] to assist with reaching prior level of function. [] Progressing: [] Met: [] Not Met: [] Adjusted  2. Patient will demonstrate increased AROM to [facility] to allow for proper joint functioning as indicated by patients Functional Deficits. [] Progressing: [] Met: [] Not Met: [] Adjusted  3. Patient will demonstrate an increase in Strength to 4/5 to allow for proper functional mobility as indicated by patients Functional Deficits. [] Progressing: [] Met: [] Not Met: [] Adjusted  4. Patient will return to 75% functional activities without increased symptoms or restriction. [] Progressing: [] Met: [] Not Met: [] Adjusted  5.  Pt will be able to reach behind her back and behind her head for non adapted hyigene strategies. (patient specific functional goal)    [] Progressing: [] Met: [] Not Met: [] Adjusted       Electronically signed by:  Ja Michael, [address] [place] [address]

## 2021-05-24 NOTE — FLOWSHEET NOTE
[facility],  [address]  Phone: [phone]  Fax [phone]        Date:  2021    Patient Name:  Rachael Jenkins    :  1965  MRN: [id number]  Restrictions/Precautions:    Medical/Treatment Diagnosis Information:  Diagnosis: Left shoulder pain M25.512  Treatment Diagnosis: L shoulder pain and stiffness  Insurance/Certification information:  PT Insurance Information: [facility] [place]- no auth, no co pay, 40 visits, no telehealth  Physician Information:  Referring Practitioner: Zhane Freeman  Has the plan of care been signed (Y/N):        []  Yes  [x]  No     Date of Patient follow up with Physician: 2 weeks for injection      Is this a Progress Report:     []  Yes  [x]  No        If Yes:  Date Range for reporting period:  Beginning 2021  Ending 2021    Progress report will be due (10 Rx or 30 days whichever is less): see above       Recertification will be due (POC Duration  / 90 days whichever is less): 8 weeks from IE ()     Visit # Insurance Allowable Auth Required   In-person 1 40 []  Yes [x]  No    Telehealth X X []  Yes []  No    Total            Functional Scale: 32% Q DASH    Date assessed:  2021      Therapy Diagnosis/Practice Pattern: L shoulder pain/ stiffness/ C      Number of Comorbidities:  []0     [x]1-2    []3+    Latex Allergy:  [x]NO      []YES  Preferred Language for Healthcare:   [x]English       []other:      Pain level:  3/10     SUBJECTIVE:  See eval    OBJECTIVE: See eval   Observation:    Test measurements:      RESTRICTIONS/PRECAUTIONS: none    Exercises/Interventions:     Therapeutic Ex (79472) Sets/sec Reps Notes/CUES   Table slide H 10 10    Cane ER standing at wall H 10 10    Cane behind the back (s) H 10 10    Scap retract H5 15                                                    Manual Intervention (29196)      PROM, joint mob- post/ inf glides GH; pec major/ minor (s) 10 mins                                   NMR re-education (12821)   CUES NEEDED                                                         Therapeutic Activity (25409)                                                Therapeutic Exercise and NMR EXR  [x] (44800) Provided verbal/tactile cueing for activities related to strengthening, flexibility, endurance, ROM  for improvements in scapular, scapulothoracic and UE control with self care, reaching, carrying, lifting, house/yardwork, driving/computer work. [x] (76597) Provided verbal/tactile cueing for activities related to improving balance, coordination, kinesthetic sense, posture, motor skill, proprioception  to assist with  scapular, scapulothoracic and UE control with self care, reaching, carrying, lifting, house/yardwork, driving/computer work. Therapeutic Activities:    [x] (21104 or 12142) Provided verbal/tactile cueing for activities related to improving balance, coordination, kinesthetic sense, posture, motor skill, proprioception and motor activation to allow for proper function of scapular, scapulothoracic and UE control with self care, carrying, lifting, driving/computer work.      Home Exercise Program:    [x] (69975) Reviewed/Progressed HEP activities related to strengthening, flexibility, endurance, ROM of scapular, scapulothoracic and UE control with self care, reaching, carrying, lifting, house/yardwork, driving/computer work  [x] (24485) Reviewed/Progressed HEP activities related to improving balance, coordination, kinesthetic sense, posture, motor skill, proprioception of scapular, scapulothoracic and UE control with self care, reaching, carrying, lifting, house/yardwork, driving/computer work      Manual Treatments:  PROM / STM / Oscillations-Mobs:  G-I, II, III, IV (PA's, Inf., Post.)  [x] (38849) Provided manual therapy to mobilize soft tissue/joints of cervical/CT, scapular GHJ and UE for the purpose of modulating pain, promoting relaxation,  increasing ROM, reducing/eliminating soft tissue swelling/inflammation/restriction, improving soft tissue extensibility and allowing for proper ROM for normal function with self care, reaching, carrying, lifting, house/yardwork, driving/computer work    Modalities:      Charges  Timed Code Treatment Minutes: 30   Total Treatment Minutes: 60       [x] EVAL (LOW) 76470   [] EVAL (MOD) 99765   [] EVAL (HIGH) 34041   [] RE-EVAL     [x] PU(37199) x 1    [] IONTO  [] NMR (87385) x     [] VASO  [x] Manual (12483) x 1     [] Other:  [] TA x      [] Mech Traction (51904)  [] ES(attended) (99207)      [] ES (un) (72015):     HEP instruction: (see scanned forms)  Access Code: [email or web address]  URL: [email or web address]  Date: 05/24/2021  Prepared by: Krunalylene Ringer     Exercises  Seated Shoulder Flexion Towel Slide at Table Top - 2-3 x daily - 7 x weekly - 10 reps - 10 hold  Standing Shoulder External Rotation AAROM with Dowel - 2-3 x daily - 7 x weekly - 10 reps - 10 hold  Standing Bilateral Shoulder Internal Rotation AAROM with Dowel - 2-3 x daily - 7 x weekly - 10 reps - 10 hold  Seated Scapular Retraction - 2-3 x daily - 7 x weekly - 15 reps - 5 hold     GOALS:  Patient stated goal: improve motion, return to swimming  []? Progressing: []? Met: []? Not Met: []? Adjusted     Therapist goals for Patient:   Short Term Goals: To be achieved in: 2 weeks  1. Independent in HEP and progression per patient tolerance, in order to prevent re-injury. []? Progressing: []? Met: []? Not Met: []? Adjusted  2. Patient will have a decrease in pain to facilitate improvement in movement, function, and ADLs as indicated by Functional Deficits. []? Progressing: []? Met: []? Not Met: []? Adjusted     Long Term Goals: To be achieved in: 8 weeks  1. Disability index score of 16% or less for the [facility] to assist with reaching prior level of function. []? Progressing: []? Met: []? Not Met: []? Adjusted  2.  Patient will demonstrate increased AROM to [facility] to allow for proper joint functioning as indicated by patients Functional Deficits. []? Progressing: []? Met: []? Not Met: []? Adjusted  3. Patient will demonstrate an increase in Strength to 4/5 to allow for proper functional mobility as indicated by patients Functional Deficits. []? Progressing: []? Met: []? Not Met: []? Adjusted  4. Patient will return to 75% functional activities without increased symptoms or restriction. []? Progressing: []? Met: []? Not Met: []? Adjusted  5. Pt will be able to reach behind her back and behind her head for non adapted hyigene strategies. (patient specific functional goal)    []? Progressing: []? Met: []? Not Met: []? Adjusted            Progression Towards Functional goals:  [] Patient is progressing as expected towards functional goals listed. [] Progression is slowed due to complexities listed. [] Progression has been slowed due to co-morbidities. [x] Plan just implemented, too soon to assess goals progression  [] Other:     ASSESSMENT:  See eval    Overall Progression Towards Functional goals/ Treatment Progress Update:  [] Patient is progressing as expected towards functional goals listed. [] Progression is slowed due to complexities/Impairments listed. [] Progression has been slowed due to co-morbidities.   [x] Plan just implemented, too soon to assess goals progression <30days   [] Goals require adjustment due to lack of progress  [] Patient is not progressing as expected and requires additional follow up with physician  [] Other    Prognosis for POC: [x] Good [] Fair  [] Poor      Patient requires continued skilled intervention: [x] Yes  [] No    Treatment/Activity Tolerance:  [x] Patient able to complete treatment  [] Patient limited by fatigue  [] Patient limited by pain    [] Patient limited by other medical complications  [] Other:         PLAN: See eval  [] Continue per plan of care [] Alter current plan (see comments above)  [x] Plan of care initiated [] Hold pending MD visit [] Discharge      Electronically signed by:  Juarez Honeycutt, [place] [id number]    Note: If patient does not return for scheduled/ recommended follow up visits, this note will serve as a discharge from care along with most recent update on progress.

## 2021-05-27 ENCOUNTER — IMMUNIZATION (OUTPATIENT)
Dept: PRIMARY CARE CLINIC | Age: 56
End: 2021-05-27
Payer: COMMERCIAL

## 2021-05-27 PROCEDURE — 91301 COVID-19, MODERNA VACCINE 100MCG/0.5ML DOSE: CPT

## 2021-05-27 PROCEDURE — 0012A COVID-19, MODERNA VACCINE 100MCG/0.5ML DOSE: CPT

## 2021-05-28 ENCOUNTER — HOSPITAL ENCOUNTER (OUTPATIENT)
Dept: PHYSICAL THERAPY | Age: 56
Setting detail: THERAPIES SERIES
Discharge: HOME | End: 2021-05-28
Payer: COMMERCIAL

## 2021-05-28 PROCEDURE — 97112 NEUROMUSCULAR REEDUCATION: CPT | Performed by: PHYSICAL THERAPIST

## 2021-05-28 PROCEDURE — 97140 MANUAL THERAPY 1/> REGIONS: CPT | Performed by: PHYSICAL THERAPIST

## 2021-05-28 PROCEDURE — 97110 THERAPEUTIC EXERCISES: CPT | Performed by: PHYSICAL THERAPIST

## 2021-05-28 NOTE — FLOWSHEET NOTE
[facility],  [address]  Phone: [phone]  Fax [phone]        Date:  2021    Patient Name:  Belen Felipe    :  1965  MRN: [id number]  Restrictions/Precautions:    Medical/Treatment Diagnosis Information:  · Diagnosis: Left shoulder pain M25.512  · Treatment Diagnosis: L shoulder pain and stiffness  Insurance/Certification information:  PT Insurance Information: Medical [place]- no auth, no co pay, 40 visits, no telehealth  Physician Information:  Referring Practitioner: Maria G Macdonald  Has the plan of care been signed (Y/N):        []? Yes                    [x]? No       Date of Patient follow up with Physician: 2 weeks for injection      Is this a Progress Report:     []  Yes  [x]  No        If Yes:  Date Range for reporting period:  Beginning 2021  Ending 2021    Progress report will be due (10 Rx or 30 days whichever is less): see above       Recertification will be due (POC Duration  / 90 days whichever is less): 8 weeks from IE ()     Visit # Insurance Allowable Auth Required   In-person 2 40 []  Yes [x]  No    Telehealth X X []  Yes []  No    Total            Functional Scale: 32% Q DASH    Date assessed:  2021      Therapy Diagnosis/Practice Pattern: L shoulder pain/ stiffness/ C      Number of Comorbidities:  []0     [x]1-2    []3+    Latex Allergy:  [x]NO      []YES  Preferred Language for Healthcare:   [x]English       []other:      Pain level:  3/10     SUBJECTIVE:  Pt reports that shoulder is not as sore with movement. She had a massage yesterday that also seemed to help. No trouble with exercises.      OBJECTIVE:    Observation:    Test measurements:  Flex: 160 AAROM, ER 41    RESTRICTIONS/PRECAUTIONS: none    Exercises/Interventions:     Therapeutic Ex (12858) Sets/sec Reps Notes/CUES   Nate H5 15     Table slide H 10 10    Cane ER standing at wall  Supine this date H 10 10    Cane behind the back (s) H 10 10    Scap retract H5 15    Wall slide 10 10    Cane flex 10 10    Beach chair 30 3                                  Manual Intervention (86358)      PROM, joint mob- post/ inf glides GH; pec major/ minor (s) 10 mins                                   NMR re-education (21736)   CUES NEEDED   Prone B scap retract + shoulder ext H5 15    TB row (GTB) 2 10    TB ext (RTB) 2 10                                        Therapeutic Activity (00616)                                                Therapeutic Exercise and NMR EXR  [x] (13103) Provided verbal/tactile cueing for activities related to strengthening, flexibility, endurance, ROM  for improvements in scapular, scapulothoracic and UE control with self care, reaching, carrying, lifting, house/yardwork, driving/computer work. [x] (12562) Provided verbal/tactile cueing for activities related to improving balance, coordination, kinesthetic sense, posture, motor skill, proprioception  to assist with  scapular, scapulothoracic and UE control with self care, reaching, carrying, lifting, house/yardwork, driving/computer work. Therapeutic Activities:    [x] (27819 or 23175) Provided verbal/tactile cueing for activities related to improving balance, coordination, kinesthetic sense, posture, motor skill, proprioception and motor activation to allow for proper function of scapular, scapulothoracic and UE control with self care, carrying, lifting, driving/computer work.      Home Exercise Program:    [x] (37723) Reviewed/Progressed HEP activities related to strengthening, flexibility, endurance, ROM of scapular, scapulothoracic and UE control with self care, reaching, carrying, lifting, house/yardwork, driving/computer work  [x] (26101) Reviewed/Progressed HEP activities related to improving balance, coordination, kinesthetic sense, posture, motor skill, proprioception of scapular, scapulothoracic and UE control with self care, reaching, carrying, lifting, house/yardwork, driving/computer work      Manual Treatments:  PROM / STM / Oscillations-Mobs:  G-I, II, III, IV (PA's, Inf., Post.)  [x] (26228) Provided manual therapy to mobilize soft tissue/joints of cervical/CT, scapular GHJ and UE for the purpose of modulating pain, promoting relaxation,  increasing ROM, reducing/eliminating soft tissue swelling/inflammation/restriction, improving soft tissue extensibility and allowing for proper ROM for normal function with self care, reaching, carrying, lifting, house/yardwork, driving/computer work    Modalities:      Charges  Timed Code Treatment Minutes: 42   Total Treatment Minutes: 45       [x] EVAL (LOW) 07293   [] EVAL (MOD) 45834   [] EVAL (HIGH) 41430   [] RE-EVAL     [x] HD(70697) x 1    [] IONTO  [x] NMR (83724) x  1   [] VASO  [x] Manual (32016) x 1     [] Other:  [] TA x      [] Mech Traction (61520)  [] ES(attended) (86371)      [] ES (un) (12085): Access Code: [email or web address]  URL: [email or web address]  Date: 05/24/2021  Prepared by: Cb Bazzi    Exercises  Seated Shoulder Flexion Towel Slide at Table Top - 2-3 x daily - 7 x weekly - 10 reps - 10 hold  Standing Shoulder External Rotation AAROM with Dowel - 2-3 x daily - 7 x weekly - 10 reps - 10 hold  Standing Bilateral Shoulder Internal Rotation AAROM with Dowel - 2-3 x daily - 7 x weekly - 10 reps - 10 hold  Supine Shoulder Flexion with Dowel - 2-3 x daily - 7 x weekly - 10 reps - 10 hold  Supine Shoulder External Rotation in 45 Degrees Abduction AAROM with Dowel - 1 x daily - 7 x weekly - 3 sets - 10 reps - 10 hold  Prone Scapular Slide with Shoulder Extension - 2-3 x daily - 7 x weekly - 10 reps - 5 hold      GOALS:  Patient stated goal: improve motion, return to swimming  []? Progressing: []? Met: []? Not Met: []? Adjusted     Therapist goals for Patient:   Short Term Goals: To be achieved in: 2 weeks  1.  Independent in HEP and progression per patient tolerance, in order to prevent re-injury. []? Progressing: []? Met: []? Not Met: []? Adjusted  2. Patient will have a decrease in pain to facilitate improvement in movement, function, and ADLs as indicated by Functional Deficits. []? Progressing: []? Met: []? Not Met: []? Adjusted     Long Term Goals: To be achieved in: 8 weeks  1. Disability index score of 16% or less for the [facility] to assist with reaching prior level of function. []? Progressing: []? Met: []? Not Met: []? Adjusted  2. Patient will demonstrate increased AROM to [facility] to allow for proper joint functioning as indicated by patients Functional Deficits. []? Progressing: []? Met: []? Not Met: []? Adjusted  3. Patient will demonstrate an increase in Strength to 4/5 to allow for proper functional mobility as indicated by patients Functional Deficits. []? Progressing: []? Met: []? Not Met: []? Adjusted  4. Patient will return to 75% functional activities without increased symptoms or restriction. []? Progressing: []? Met: []? Not Met: []? Adjusted  5. Pt will be able to reach behind her back and behind her head for non adapted hyigene strategies. (patient specific functional goal)    []? Progressing: []? Met: []? Not Met: []? Adjusted            Progression Towards Functional goals:  [] Patient is progressing as expected towards functional goals listed. [] Progression is slowed due to complexities listed. [] Progression has been slowed due to co-morbidities. [x] Plan just implemented, too soon to assess goals progression  [] Other:     ASSESSMENT:  Pt exhibits progress in ROM this date. Added additional stretching, ROM and scap stability exercises. [address] program was updated. Overall Progression Towards Functional goals/ Treatment Progress Update:  [] Patient is progressing as expected towards functional goals listed. [] Progression is slowed due to complexities/Impairments listed.   [] Progression has been slowed due to co-morbidities. [x] Plan just implemented, too soon to assess goals progression <30days   [] Goals require adjustment due to lack of progress  [] Patient is not progressing as expected and requires additional follow up with physician  [] Other    Prognosis for POC: [x] Good [] Fair  [] Poor      Patient requires continued skilled intervention: [x] Yes  [] No    Treatment/Activity Tolerance:  [x] Patient able to complete treatment  [] Patient limited by fatigue  [] Patient limited by pain    [] Patient limited by other medical complications  [] Other:         PLAN: See eval  [x] Continue per plan of care [] Alter current plan (see comments above)  [] Plan of care initiated [] Hold pending MD visit [] Discharge      Electronically signed by:  Kem Sheehan, [place] [id number]    Note: If patient does not return for scheduled/ recommended follow up visits, this note will serve as a discharge from care along with most recent update on progress.

## 2021-06-01 ENCOUNTER — HOSPITAL ENCOUNTER (OUTPATIENT)
Dept: PHYSICAL THERAPY | Age: 56
Setting detail: THERAPIES SERIES
Discharge: HOME | End: 2021-06-01
Payer: COMMERCIAL

## 2021-06-01 PROCEDURE — 97140 MANUAL THERAPY 1/> REGIONS: CPT | Performed by: PHYSICAL THERAPIST

## 2021-06-01 PROCEDURE — 97110 THERAPEUTIC EXERCISES: CPT | Performed by: PHYSICAL THERAPIST

## 2021-06-01 PROCEDURE — 97112 NEUROMUSCULAR REEDUCATION: CPT | Performed by: PHYSICAL THERAPIST

## 2021-06-01 NOTE — FLOWSHEET NOTE
[facility],  [address] [place]  Phone: [phone]  Fax [phone]        Date:  2021    Patient Name:  Rachael Jenkins    :  1965  MRN: [id number]  Restrictions/Precautions:    Medical/Treatment Diagnosis Information:  · Diagnosis: Left shoulder pain M25.512  · Treatment Diagnosis: L shoulder pain and stiffness  Insurance/Certification information:  PT Insurance Information: Medical [place]- no auth, no co pay, 40 visits, no telehealth  Physician Information:  Referring Practitioner: Zhane Freeman  Has the plan of care been signed (Y/N):        []? Yes                    [x]? No       Date of Patient follow up with Physician: 2 weeks for injection      Is this a Progress Report:     []  Yes  [x]  No        If Yes:  Date Range for reporting period:  Beginning 2021  Ending 2021    Progress report will be due (10 Rx or 30 days whichever is less): see above       Recertification will be due (POC Duration  / 90 days whichever is less): 8 weeks from IE ()     Visit # Insurance Allowable Auth Required   In-person 2 40 []  Yes [x]  No    Telehealth X X []  Yes []  No    Total            Functional Scale: 32% Q DASH    Date assessed:  2021      Therapy Diagnosis/Practice Pattern: L shoulder pain/ stiffness/ C      Number of Comorbidities:  []0     [x]1-2    []3+    Latex Allergy:  [x]NO      []YES  Preferred Language for Healthcare:   [x]English       []other:      Pain level:  3/10     SUBJECTIVE:  Pt reports that shoulder is not as sore with movement. She had a massage yesterday that also seemed to help. No trouble with exercises.      OBJECTIVE:    Observation:    Test measurements:  Flex: 160 AAROM, ER 41    RESTRICTIONS/PRECAUTIONS: none    Exercises/Interventions:     Therapeutic Ex (93069) Sets/sec Reps Notes/CUES   Nate H5 15     Table slide H 10 10    Attempted pec stretching but too painful in superior/ post capsule         Wall slide: 11, 12, 1 8 ea     Cane flex [address] chair 30 3    No Money GTB 20 reps    SL post capsule (s) 30\" 5                       Manual Intervention (91184)      PROM, joint mob- post/ inf glides GH; pec major/ minor (s) 16 mins                                   NMR re-education (66129)   CUES NEEDED   Prone B scap retract + shoulder ext  + MT H 10  H 5 15  15    TB row (GTB) 2 10    TB ext (GTB) 2 15                                        Therapeutic Activity (69922)                                                Therapeutic Exercise and NMR EXR  [x] (89417) Provided verbal/tactile cueing for activities related to strengthening, flexibility, endurance, ROM  for improvements in scapular, scapulothoracic and UE control with self care, reaching, carrying, lifting, house/yardwork, driving/computer work. [x] (54131) Provided verbal/tactile cueing for activities related to improving balance, coordination, kinesthetic sense, posture, motor skill, proprioception  to assist with  scapular, scapulothoracic and UE control with self care, reaching, carrying, lifting, house/yardwork, driving/computer work. Therapeutic Activities:    [x] (07083 or 30783) Provided verbal/tactile cueing for activities related to improving balance, coordination, kinesthetic sense, posture, motor skill, proprioception and motor activation to allow for proper function of scapular, scapulothoracic and UE control with self care, carrying, lifting, driving/computer work.      Home Exercise Program:    [x] (66851) Reviewed/Progressed HEP activities related to strengthening, flexibility, endurance, ROM of scapular, scapulothoracic and UE control with self care, reaching, carrying, lifting, house/yardwork, driving/computer work  [x] (87736) Reviewed/Progressed HEP activities related to improving balance, coordination, kinesthetic sense, posture, motor skill, proprioception of scapular, scapulothoracic and UE control with self care, reaching, carrying, lifting, house/yardwork, driving/computer work      Manual Treatments:  PROM / STM / Oscillations-Mobs:  G-I, II, III, IV (PA's, Inf., Post.)  [x] (63627) Provided manual therapy to mobilize soft tissue/joints of cervical/CT, scapular GHJ and UE for the purpose of modulating pain, promoting relaxation,  increasing ROM, reducing/eliminating soft tissue swelling/inflammation/restriction, improving soft tissue extensibility and allowing for proper ROM for normal function with self care, reaching, carrying, lifting, house/yardwork, driving/computer work    Modalities:      Charges  Timed Code Treatment Minutes: 45   Total Treatment Minutes: 50       [x] EVAL (LOW) 61953   [] EVAL (MOD) 54958   [] EVAL (HIGH) 04910   [] RE-EVAL     [x] ZN(83865) x 1    [] IONTO  [x] NMR (90607) x  1   [] VASO  [x] Manual (95829) x 1     [] Other:  [] TA x      [] Mech Traction (69080)  [] ES(attended) (17188)      [] ES (un) (72361): Access Code: [email or web address]  URL: [email or web address]  Date: 05/24/2021  Prepared by: Bonnie Smiley    Exercises  Seated Shoulder Flexion Towel Slide at Table Top - 2-3 x daily - 7 x weekly - 10 reps - 10 hold  Standing Shoulder External Rotation AAROM with Dowel - 2-3 x daily - 7 x weekly - 10 reps - 10 hold  Standing Bilateral Shoulder Internal Rotation AAROM with Dowel - 2-3 x daily - 7 x weekly - 10 reps - 10 hold  Supine Shoulder Flexion with Dowel - 2-3 x daily - 7 x weekly - 10 reps - 10 hold  Supine Shoulder External Rotation in 45 Degrees Abduction AAROM with Dowel - 1 x daily - 7 x weekly - 3 sets - 10 reps - 10 hold  Prone Scapular Slide with Shoulder Extension - 2-3 x daily - 7 x weekly - 10 reps - 5 hold      GOALS:  Patient stated goal: improve motion, return to swimming  []? Progressing: []? Met: []? Not Met: []? Adjusted     Therapist goals for Patient:   Short Term Goals: To be achieved in: 2 weeks  1.  Independent in HEP and progression per patient tolerance, in order to prevent re-injury. []? Progressing: []? Met: []? Not Met: []? Adjusted  2. Patient will have a decrease in pain to facilitate improvement in movement, function, and ADLs as indicated by Functional Deficits. []? Progressing: []? Met: []? Not Met: []? Adjusted     Long Term Goals: To be achieved in: 8 weeks  1. Disability index score of 16% or less for the [facility] to assist with reaching prior level of function. []? Progressing: []? Met: []? Not Met: []? Adjusted  2. Patient will demonstrate increased AROM to [facility] to allow for proper joint functioning as indicated by patients Functional Deficits. []? Progressing: []? Met: []? Not Met: []? Adjusted  3. Patient will demonstrate an increase in Strength to 4/5 to allow for proper functional mobility as indicated by patients Functional Deficits. []? Progressing: []? Met: []? Not Met: []? Adjusted  4. Patient will return to 75% functional activities without increased symptoms or restriction. []? Progressing: []? Met: []? Not Met: []? Adjusted  5. Pt will be able to reach behind her back and behind her head for non adapted hyigene strategies. (patient specific functional goal)    []? Progressing: []? Met: []? Not Met: []? Adjusted            Progression Towards Functional goals:  [] Patient is progressing as expected towards functional goals listed. [] Progression is slowed due to complexities listed. [] Progression has been slowed due to co-morbidities. [x] Plan just implemented, too soon to assess goals progression  [] Other:     ASSESSMENT:  Pt exhibits progress in ROM this date but with increased pain in [facility] joint. Requires postural re-ed. Tight pec with ant [facility] resting position    Overall Progression Towards Functional goals/ Treatment Progress Update:  [] Patient is progressing as expected towards functional goals listed. [] Progression is slowed due to complexities/Impairments listed.   [] Progression has been slowed due to co-morbidities. [x] Plan just implemented, too soon to assess goals progression <30days   [] Goals require adjustment due to lack of progress  [] Patient is not progressing as expected and requires additional follow up with physician  [] Other    Prognosis for POC: [x] Good [] Fair  [] Poor      Patient requires continued skilled intervention: [x] Yes  [] No    Treatment/Activity Tolerance:  [x] Patient able to complete treatment  [] Patient limited by fatigue  [] Patient limited by pain    [] Patient limited by other medical complications  [] Other:         PLAN: Continue PT 1-2 x per week. [x] Continue per plan of care [] Alter current plan (see comments above)  [] Plan of care initiated [] Hold pending MD visit [] Discharge      Electronically signed by:  Maricruz Martinez, PT [id number]    Note: If patient does not return for scheduled/ recommended follow up visits, this note will serve as a discharge from care along with most recent update on progress.

## 2021-06-04 ENCOUNTER — HOSPITAL ENCOUNTER (OUTPATIENT)
Dept: PHYSICAL THERAPY | Age: 56
Setting detail: THERAPIES SERIES
Discharge: HOME | End: 2021-06-04
Payer: COMMERCIAL

## 2021-06-04 PROCEDURE — 97530 THERAPEUTIC ACTIVITIES: CPT | Performed by: PHYSICAL THERAPIST

## 2021-06-04 PROCEDURE — 97110 THERAPEUTIC EXERCISES: CPT | Performed by: PHYSICAL THERAPIST

## 2021-06-04 PROCEDURE — 97140 MANUAL THERAPY 1/> REGIONS: CPT | Performed by: PHYSICAL THERAPIST

## 2021-06-04 NOTE — FLOWSHEET NOTE
[facility], [address] [place]  Phone: [phone]  Fax [phone]        Date:  2021    Patient Name:  Elmira Hairston    :  1965  MRN: [id number]  Restrictions/Precautions:    Medical/Treatment Diagnosis Information:  · Diagnosis: Left shoulder pain M25.512  · Treatment Diagnosis: L shoulder pain and stiffness  Insurance/Certification information:  PT Insurance Information: [employer] [place]- no auth, no co pay, 40 visits, no telehealth  Physician Information:  Referring Practitioner: Vasyl Beard  Has the plan of care been signed (Y/N):        []? Yes                    [x]? No       Date of Patient follow up with Physician: 2 weeks for injection      Is this a Progress Report:     []  Yes  [x]  No        If Yes:  Date Range for reporting period:  Beginning 2021  Ending 2021    Progress report will be due (10 Rx or 30 days whichever is less): see above       Recertification will be due (POC Duration  / 90 days whichever is less): 8 weeks from IE ()     Visit # Insurance Allowable Auth Required   In-person 4 40 []  Yes [x]  No    Telehealth X X []  Yes []  No    Total            Functional Scale: 32% Q DASH    Date assessed:  2021      Therapy Diagnosis/Practice Pattern: L shoulder pain/ stiffness/ C      Number of Comorbidities:  []0     [x]1-2    []3+    Latex Allergy:  [x]NO      []YES  Preferred Language for Healthcare:   [x]English       []other:      Pain level:  3/10     SUBJECTIVE:  Pt reports that shoulder is moving better overall. Less pain but still feels restricted.      OBJECTIVE:    Observation:    Test measurements:  PROM:  ER 54    RESTRICTIONS/PRECAUTIONS: none    Exercises/Interventions:     Therapeutic Ex (88832) Sets/sec Reps Notes/CUES   Nate H5 15     Table slide H 10 10    Attempted pec stretching but too painful in superior/ post capsule   IR strap (S) H30 3 Wall slide: 11, 12, 1 8 ea     Cane flex [address] chair 30 3    No Money GTB 20 reps    SL post capsule (s) 30\" 5     SL ABD  SL H ABD  SL ER 5\" 15 ea    T spine mob at wall with stepping  reaches  10 ea B  10 ea B          Manual Intervention (10831)      PROM, joint mob- post/ inf glides GH; pec major/ minor (s)  HR to ER 16 mins                                   NMR re-education (83326)   CUES NEEDED   Prone B scap retract + shoulder ext  + MT H 10  H 5 15  15       TB ext (GTB) 2 15                                        Therapeutic Activity (47123)      Timothy Stretch: squat in flex, ER at neutral/ 90 w/ man scap glide  Post capsule stretch with foot  8 mins                                         Therapeutic Exercise and NMR EXR  [x] (52222) Provided verbal/tactile cueing for activities related to strengthening, flexibility, endurance, ROM  for improvements in scapular, scapulothoracic and UE control with self care, reaching, carrying, lifting, house/yardwork, driving/computer work. [x] (18188) Provided verbal/tactile cueing for activities related to improving balance, coordination, kinesthetic sense, posture, motor skill, proprioception  to assist with  scapular, scapulothoracic and UE control with self care, reaching, carrying, lifting, house/yardwork, driving/computer work. Therapeutic Activities:    [x] (53094 or 25492) Provided verbal/tactile cueing for activities related to improving balance, coordination, kinesthetic sense, posture, motor skill, proprioception and motor activation to allow for proper function of scapular, scapulothoracic and UE control with self care, carrying, lifting, driving/computer work.      Home Exercise Program:    [x] (50143) Reviewed/Progressed HEP activities related to strengthening, flexibility, endurance, ROM of scapular, scapulothoracic and UE control with self care, reaching, carrying, lifting, house/yardwork, driving/computer work  [x] (42266) Reviewed/Progressed HEP activities related to improving balance, coordination, kinesthetic sense, posture, motor skill, proprioception of scapular, scapulothoracic and UE control with self care, reaching, carrying, lifting, house/yardwork, driving/computer work      Manual Treatments:  PROM / STM / Oscillations-Mobs:  G-I, II, III, IV (PA's, Inf., Post.)  [x] (43723) Provided manual therapy to mobilize soft tissue/joints of cervical/CT, scapular GHJ and UE for the purpose of modulating pain, promoting relaxation,  increasing ROM, reducing/eliminating soft tissue swelling/inflammation/restriction, improving soft tissue extensibility and allowing for proper ROM for normal function with self care, reaching, carrying, lifting, house/yardwork, driving/computer work    Modalities:      Charges  Timed Code Treatment Minutes: 45   Total Treatment Minutes: 50       [x] EVAL (LOW) 49876   [] EVAL (MOD) 33458   [] EVAL (HIGH) 36586   [] RE-EVAL     [x] RM(52782) x 1    [] IONTO  [x] NMR (82392) x  1   [] VASO  [x] Manual (90111) x 1     [] Other:  [] TA x      [] Mech Traction (99309)  [] ES(attended) (45172)      [] ES (un) (78925): Access Code: [email or web address]  URL: [email or web address]  Date: 06/04/2021  Prepared by: Sera Huynh    Exercises  Shoulder Flexion Wall Slide with Towel - 1 x daily - 7 x weekly - 10 reps  Supine Shoulder Flexion with Dowel - 2-3 x daily - 7 x weekly - 10 reps - 10 hold  Standing Shoulder External Rotation AAROM with Dowel - 2-3 x daily - 7 x weekly - 10 reps - 10 hold  Supine Shoulder External Rotation in 45 Degrees Abduction AAROM with Dowel - 1 x daily - 7 x weekly - 3 sets - 10 reps - 10 hold  Sidelying Shoulder Abduction Palm Forward - 1 x daily - 7 x weekly - 15 reps - 5 hold  Sidelying Shoulder External Rotation with Dumbbell - 1 x daily - 7 x weekly - 15 reps - 5 hold  Prone Scapular Slide with Shoulder Extension - 2-3 x daily - 7 x weekly - 10 reps - 5 hold  Standing Shoulder Internal Rotation Stretch with Towel - 1 x daily - 7 x weekly - 3 sets - 30 hold      GOALS:  Patient stated goal: improve motion, return to swimming  []? Progressing: []? Met: []? Not Met: []? Adjusted     Therapist goals for Patient:   Short Term Goals: To be achieved in: 2 weeks  1. Independent in HEP and progression per patient tolerance, in order to prevent re-injury. []? Progressing: []? Met: []? Not Met: []? Adjusted  2. Patient will have a decrease in pain to facilitate improvement in movement, function, and ADLs as indicated by Functional Deficits. []? Progressing: []? Met: []? Not Met: []? Adjusted     Long Term Goals: To be achieved in: 8 weeks  1. Disability index score of 16% or less for the [facility] to assist with reaching prior level of function. []? Progressing: []? Met: []? Not Met: []? Adjusted  2. Patient will demonstrate increased AROM to [facility] to allow for proper joint functioning as indicated by patients Functional Deficits. []? Progressing: []? Met: []? Not Met: []? Adjusted  3. Patient will demonstrate an increase in Strength to 4/5 to allow for proper functional mobility as indicated by patients Functional Deficits. []? Progressing: []? Met: []? Not Met: []? Adjusted  4. Patient will return to 75% functional activities without increased symptoms or restriction. []? Progressing: []? Met: []? Not Met: []? Adjusted  5. Pt will be able to reach behind her back and behind her head for non adapted hyigene strategies. (patient specific functional goal)    []? Progressing: []? Met: []? Not Met: []? Adjusted            Progression Towards Functional goals:  [] Patient is progressing as expected towards functional goals listed. [] Progression is slowed due to complexities listed. [] Progression has been slowed due to co-morbidities. [x] Plan just implemented, too soon to assess goals progression  [] Other:     ASSESSMENT:  Pt exhibits progress in ROM this date. Updated HEP.  Pt requires skilled intervention to restore ROM, strength, functional endurance and balance in order to perform ADLs without significant symptoms or limitations. Overall Progression Towards Functional goals/ Treatment Progress Update:  [x] Patient is progressing as expected towards functional goals listed. [] Progression is slowed due to complexities/Impairments listed. [] Progression has been slowed due to co-morbidities. [] Plan just implemented, too soon to assess goals progression <30days   [] Goals require adjustment due to lack of progress  [] Patient is not progressing as expected and requires additional follow up with physician  [] Other    Prognosis for POC: [x] Good [] Fair  [] Poor      Patient requires continued skilled intervention: [x] Yes  [] No    Treatment/Activity Tolerance:  [x] Patient able to complete treatment  [] Patient limited by fatigue  [] Patient limited by pain    [] Patient limited by other medical complications  [] Other:         PLAN: Continue PT 1-2 x per week. [x] Continue per plan of care [] Alter current plan (see comments above)  [] Plan of care initiated [] Hold pending MD visit [] Discharge      Electronically signed by:  Yulia Santos, PT [id number]    Note: If patient does not return for scheduled/ recommended follow up visits, this note will serve as a discharge from care along with most recent update on progress.

## 2021-06-08 ENCOUNTER — HOSPITAL ENCOUNTER (OUTPATIENT)
Dept: PHYSICAL THERAPY | Age: 56
Setting detail: THERAPIES SERIES
Discharge: HOME | End: 2021-06-08
Payer: COMMERCIAL

## 2021-06-08 PROCEDURE — 97110 THERAPEUTIC EXERCISES: CPT | Performed by: PHYSICAL THERAPIST

## 2021-06-08 PROCEDURE — 97140 MANUAL THERAPY 1/> REGIONS: CPT | Performed by: PHYSICAL THERAPIST

## 2021-06-08 PROCEDURE — 97112 NEUROMUSCULAR REEDUCATION: CPT | Performed by: PHYSICAL THERAPIST

## 2021-06-08 NOTE — FLOWSHEET NOTE
15 reps - 5 hold  Sidelying Shoulder External Rotation with Dumbbell - 1 x daily - 7 x weekly - 15 reps - 5 hold  Prone Scapular Slide with Shoulder Extension - 2-3 x daily - 7 x weekly - 10 reps - 5 hold  Standing Shoulder Internal Rotation Stretch with Towel - 1 x daily - 7 x weekly - 3 sets - 30 hold      GOALS:  Patient stated goal: improve motion, return to swimming  []? Progressing: []? Met: []? Not Met: []? Adjusted     Therapist goals for Patient:   Short Term Goals: To be achieved in: 2 weeks  1. Independent in HEP and progression per patient tolerance, in order to prevent re-injury. []? Progressing: []? Met: []? Not Met: []? Adjusted  2. Patient will have a decrease in pain to facilitate improvement in movement, function, and ADLs as indicated by Functional Deficits. []? Progressing: []? Met: []? Not Met: []? Adjusted     Long Term Goals: To be achieved in: 8 weeks  1. Disability index score of 16% or less for the [facility] to assist with reaching prior level of function. []? Progressing: []? Met: []? Not Met: []? Adjusted  2. Patient will demonstrate increased AROM to [facility] to allow for proper joint functioning as indicated by patients Functional Deficits. []? Progressing: []? Met: []? Not Met: []? Adjusted  3. Patient will demonstrate an increase in Strength to 4/5 to allow for proper functional mobility as indicated by patients Functional Deficits. []? Progressing: []? Met: []? Not Met: []? Adjusted  4. Patient will return to 75% functional activities without increased symptoms or restriction. []? Progressing: []? Met: []? Not Met: []? Adjusted  5. Pt will be able to reach behind her back and behind her head for non adapted hyigene strategies. (patient specific functional goal)    []? Progressing: []? Met: []? Not Met: []? Adjusted            Progression Towards Functional goals:  [x] Patient is progressing as expected towards functional goals listed.     [] Progression is slowed due to complexities listed. [] Progression has been slowed due to co-morbidities. [] Plan just implemented, too soon to assess goals progression  [] Other:     ASSESSMENT:  Pt exhibits progress in ROM this date. Pt fatigued after tx. Updated HEP. Pt requires skilled intervention to restore ROM, strength, functional endurance and balance in order to perform ADLs without significant symptoms or limitations. Overall Progression Towards Functional goals/ Treatment Progress Update:  [x] Patient is progressing as expected towards functional goals listed. [] Progression is slowed due to complexities/Impairments listed. [] Progression has been slowed due to co-morbidities. [] Plan just implemented, too soon to assess goals progression <30days   [] Goals require adjustment due to lack of progress  [] Patient is not progressing as expected and requires additional follow up with physician  [] Other    Prognosis for POC: [x] Good [] Fair  [] Poor      Patient requires continued skilled intervention: [x] Yes  [] No    Treatment/Activity Tolerance:  [x] Patient able to complete treatment  [] Patient limited by fatigue  [] Patient limited by pain    [] Patient limited by other medical complications  [] Other:         PLAN: Continue PT 1-2 x per week. [x] Continue per plan of care [] Alter current plan (see comments above)  [] Plan of care initiated [] Hold pending MD visit [] Discharge      Electronically signed by:  Wily Ceja, PT [id number]    Note: If patient does not return for scheduled/ recommended follow up visits, this note will serve as a discharge from care along with most recent update on progress.

## 2021-06-09 ENCOUNTER — OFFICE VISIT (OUTPATIENT)
Dept: ENDOCRINOLOGY | Age: 56
End: 2021-06-09
Payer: COMMERCIAL

## 2021-06-09 ENCOUNTER — HOSPITAL ENCOUNTER (OUTPATIENT)
Age: 56
Discharge: HOME | End: 2021-06-09
Payer: COMMERCIAL

## 2021-06-09 VITALS
WEIGHT: 181 LBS | OXYGEN SATURATION: 98 % | BODY MASS INDEX: 28.41 KG/M2 | SYSTOLIC BLOOD PRESSURE: 112 MMHG | HEIGHT: 67 IN | HEART RATE: 89 BPM | DIASTOLIC BLOOD PRESSURE: 70 MMHG

## 2021-06-09 DIAGNOSIS — R73.03 PREDIABETES: ICD-10-CM

## 2021-06-09 DIAGNOSIS — E03.9 ACQUIRED HYPOTHYROIDISM: ICD-10-CM

## 2021-06-09 DIAGNOSIS — E03.9 ACQUIRED HYPOTHYROIDISM: Primary | ICD-10-CM

## 2021-06-09 DIAGNOSIS — E04.1 THYROID NODULE: ICD-10-CM

## 2021-06-09 LAB
T4 FREE: 1.1 NG/DL (ref 0.9–1.8)
TSH SERPL DL<=0.05 MIU/L-ACNC: 2.49 UIU/ML (ref 0.27–4.2)

## 2021-06-09 PROCEDURE — 36415 COLL VENOUS BLD VENIPUNCTURE: CPT

## 2021-06-09 PROCEDURE — 99213 OFFICE O/P EST LOW 20 MIN: CPT | Performed by: NURSE PRACTITIONER

## 2021-06-09 PROCEDURE — 83036 HEMOGLOBIN GLYCOSYLATED A1C: CPT

## 2021-06-09 PROCEDURE — 84439 ASSAY OF FREE THYROXINE: CPT

## 2021-06-09 PROCEDURE — 84443 ASSAY THYROID STIM HORMONE: CPT

## 2021-06-09 ASSESSMENT — ENCOUNTER SYMPTOMS
SHORTNESS OF BREATH: 0
EYE PAIN: 0
CONSTIPATION: 0
COLOR CHANGE: 0
ABDOMINAL PAIN: 0
DIARRHEA: 0
BACK PAIN: 0

## 2021-06-09 NOTE — ASSESSMENT & PLAN NOTE
Check TSH, FT4, FT3   Furthur treatment based on results  Ensure off of biotin for 2-3 days prior to labs

## 2021-06-09 NOTE — PROGRESS NOTES
Endocrinology  Susan Reeves, [place], [address]  [place], [address]  Phone [phone]  Fax [phone]    Alexandra Koroma is a 50 y.o. female who presents for evaluation of  hypothyroidism. Referring Provider: Pallavi Black MD     Last A1C:   Lab Results   Component Value Date    LABA1C 5.4 2020    LABA1C 5.7 2019    LABA1C 6.0 2019     Last BP Readings:   BP Readings from Last 3 Encounters:   21 112/70   21 130/78   21 129/83     Last LDL:   Lab Results   Component Value Date    LDLCALC 110 (H) 2020     Aspirin Use: no    Tobacco/Alcohol History:   Social History     Tobacco Use   Smoking Status Former Smoker    Years: 20.00    Quit date: 2002    Years since quittin.3   Smokeless Tobacco Never Used      Social History     Substance and Sexual Activity   Alcohol Use Not Currently    Alcohol/week: 0.0 standard drinks    Comment: occasionally       Thyroid:   Beryle Soman A Sandman was diagnosed with Hypothyroidism in     TSH was 4.36 and she was started on  levothyroxine-50   mcg daily  She stopped taking it in  as she wanted to go off medications  Currently not on thyroid medication      Current symptoms include fatigue, weight gain, joint pain  Patient denies feeling cold and cold intolerance, anxiousness, feeling excessive energy, tremulousness, palpitations. Obstructive symptoms  - Change in voice no  - Trouble swallowing no    Predisposing factors for thyroid disease  Exposure to radiation (neck) no  Exposure to iodine no  FH of thyroid disease Mother had hyperthyroidism ( )   FH of thyroid or other cancers none       Lab Results   Component Value Date    TSH 2.87 2020    TSH 3.01 2019    TSH 1.85 2018    T4FREE 1.1 2019    T4FREE 1.3 2019    T4FREE 1.2 2018       Thyroid nodules  Was found to have multiple small thyroid nodules. Stable on follow-up US.   No difficulty swallowing or breathing    Narrative & Impression    Thyroid ultrasound     History: Neck fullness.     The right lobe measures 4.7 x 1.3 x 1.8 cm.     The left lobe measures 4.2 x 1.5 x 1.6 cm.     The isthmus measures 4 mm.     Within the superior pole of the left lobe of thyroid there is an oval well-circumscribed mildly hypoechoic solid nodule measuring 4  x 3 x 2 mm likely benign though indeterminate. Followup in 6 months to one year recommended.     Impression:     4 mm nodule left lobe of the thyroid likely benign though indeterminate. Followup ultrasound in 6 months to one year  recommended          Past Medical History:   Diagnosis Date    Allergic rhinitis     GERD (gastroesophageal reflux disease)     Hearing loss     Nosebleed     Prolonged emergence from general anesthesia     FIRST SURGERY AWOKE VERY EMOTIONAL    Rash     TMJ dysfunction      Family History   Problem Relation Age of Onset    Diabetes Maternal Grandmother     Diabetes Other         great aunts and uncles    Esophageal Cancer Maternal Grandfather     Lupus Other         maternal great aunt    COPD Mother     Alcohol Abuse Mother         recovering    Other Mother         Mac Lung dz    Heart Disease Father      Current Outpatient Medications   Medication Sig Dispense Refill    Multiple Vitamins-Minerals (THERAPEUTIC MULTIVITAMIN-MINERALS) tablet Take 1 tablet by mouth daily       No current facility-administered medications for this visit. Review of Systems   Constitutional: Negative for activity change, appetite change, diaphoresis, fatigue and unexpected weight change. Eyes: Negative for pain and visual disturbance. Respiratory: Negative for shortness of breath. Cardiovascular: Negative for palpitations and leg swelling. Gastrointestinal: Negative for abdominal pain, constipation and diarrhea. Endocrine: Negative for cold intolerance, heat intolerance, polydipsia, polyphagia and polyuria. Musculoskeletal: Negative for back pain, gait problem, myalgias and neck pain. Skin: Negative for color change and pallor. Neurological: Negative for dizziness, weakness, light-headedness and headaches. Hematological: Does not bruise/bleed easily. Psychiatric/Behavioral: Negative for sleep disturbance. The patient is not nervous/anxious and is not hyperactive. Vitals:    06/09/21 0755   BP: 112/70   Pulse: 89   SpO2: 98%   Weight: 181 lb (82.1 kg)   Height: 5' 7\" (1.702 m)     Physical Exam  Constitutional:       Appearance: She is well-developed. She is not diaphoretic. HENT:      Head: Normocephalic and atraumatic. Eyes:      Conjunctiva/sclera: Conjunctivae normal.      Pupils: Pupils are equal, round, and reactive to light. Comments: No evidence of proptosis   Neck:      Thyroid: No thyromegaly. Cardiovascular:      Rate and Rhythm: Normal rate and regular rhythm. Pulmonary:      Effort: Pulmonary effort is normal.      Breath sounds: Normal breath sounds. Abdominal:      General: Bowel sounds are normal.      Palpations: Abdomen is soft. Musculoskeletal:         General: No tenderness. Normal range of motion. Cervical back: Normal range of motion and neck supple. Skin:     General: Skin is warm and dry. Comments: No skin changes b/l shins   Neurological:      Mental Status: She is alert and oriented to person, place, and time. Comments: No tremors noted on outstretched arms   Psychiatric:         Behavior: Behavior normal.         Thought Content:  Thought content normal.         Judgment: Judgment normal.       Assessment  Mraky Long is a 50 y.o. female with a h/o hypothyroidism and associated with multiple thyroid nodules ( benign)    Plan  Problem List Items Addressed This Visit     Acquired hypothyroidism - Primary     Check TSH, FT4, FT3   Furthur treatment based on results  Ensure off of biotin for 2-3 days prior to labs         Relevant Orders T4, Free    TSH without Reflex    Thyroid nodule     Reviewed thyroid ultrasound   Tiny 4 mm nodule noted in 2016  No new symptoms; no follow up needed             Return in about 6 months (around 12/9/2021).

## 2021-06-10 ENCOUNTER — OFFICE VISIT (OUTPATIENT)
Dept: ORTHOPEDIC SURGERY | Age: 56
End: 2021-06-10
Payer: COMMERCIAL

## 2021-06-10 VITALS — BODY MASS INDEX: 28.41 KG/M2 | HEIGHT: 67 IN | WEIGHT: 181 LBS

## 2021-06-10 DIAGNOSIS — M25.512 LEFT SHOULDER PAIN, UNSPECIFIED CHRONICITY: ICD-10-CM

## 2021-06-10 DIAGNOSIS — M75.22 BICEPS TENDINITIS OF LEFT SHOULDER: ICD-10-CM

## 2021-06-10 DIAGNOSIS — M75.02 ADHESIVE CAPSULITIS OF LEFT SHOULDER: Primary | ICD-10-CM

## 2021-06-10 LAB
ESTIMATED AVERAGE GLUCOSE: 122.6 MG/DL
HBA1C MFR BLD: 5.9 %

## 2021-06-10 PROCEDURE — 99213 OFFICE O/P EST LOW 20 MIN: CPT | Performed by: ORTHOPAEDIC SURGERY

## 2021-06-10 RX ORDER — BUPIVACAINE HYDROCHLORIDE 2.5 MG/ML
10 INJECTION, SOLUTION INFILTRATION; PERINEURAL ONCE
Status: COMPLETED | OUTPATIENT
Start: 2021-06-10 | End: 2021-06-10

## 2021-06-10 RX ORDER — TRIAMCINOLONE ACETONIDE 40 MG/ML
80 INJECTION, SUSPENSION INTRA-ARTICULAR; INTRAMUSCULAR ONCE
Status: COMPLETED | OUTPATIENT
Start: 2021-06-10 | End: 2021-06-10

## 2021-06-10 RX ORDER — METHYLPREDNISOLONE 4 MG/1
TABLET ORAL
Qty: 2 KIT | Refills: 0 | Status: SHIPPED | OUTPATIENT
Start: 2021-06-10 | End: 2021-08-04 | Stop reason: ALTCHOICE

## 2021-06-10 RX ORDER — LIDOCAINE HYDROCHLORIDE 10 MG/ML
40 INJECTION, SOLUTION INFILTRATION; PERINEURAL ONCE
Status: COMPLETED | OUTPATIENT
Start: 2021-06-10 | End: 2021-06-10

## 2021-06-10 RX ADMIN — TRIAMCINOLONE ACETONIDE 80 MG: 40 INJECTION, SUSPENSION INTRA-ARTICULAR; INTRAMUSCULAR at 08:12

## 2021-06-10 RX ADMIN — BUPIVACAINE HYDROCHLORIDE 10 MG: 2.5 INJECTION, SOLUTION INFILTRATION; PERINEURAL at 08:12

## 2021-06-10 RX ADMIN — LIDOCAINE HYDROCHLORIDE 40 MG: 10 INJECTION, SOLUTION INFILTRATION; PERINEURAL at 08:13

## 2021-06-10 NOTE — PROGRESS NOTES
Dr Eddie De La Fuente      Date /Time 6/10/2021             1:39 PM EDT  Name Elisha Licea             1965   Location  [facility]  MRN [id number]                Chief Complaint   Patient presents with    Injections     LEFT SHOULDER CORTISONE INJ         History of Present Illness    Elisha Licea is a 50 y.o. female who presents with  left Shoulder pain. She is here for follow-up. Last time, we could not do a steroid injection for adhesive capsulitis left shoulder due to proximity to Covid vaccine. Today, she is clinically better than before. She has been working hard with physical therapy and feels that her pain is significantly improved compared to before. Previous history:  She has progressive left shoulder stiffness for about 1 year. Things have gotten worse. She already has a physical therapy order for her left shoulder but is here to determine if anything else is clinically relevant. She has progressive stiffness and pain that has improved conservative measures. She has not tried any oral steroids.     Past Medical History  Past Medical History:   Diagnosis Date    Allergic rhinitis     GERD (gastroesophageal reflux disease)     Hearing loss     Nosebleed     Prolonged emergence from general anesthesia     FIRST SURGERY AWOKE VERY EMOTIONAL    Rash     TMJ dysfunction      Past Surgical History:   Procedure Laterality Date    APPENDECTOMY      CHOLECYSTECTOMY, LAPAROSCOPIC N/A 3/18/2021    LAPAROSCOPIC CHOLECYSTECTOMY WITH INTRAOPERATIVE CHOLANGIOGRAM, POSSIBLE OPEN PROCEDURE performed by Monico Worley MD at [facility] COLONOSCOPY N/A 6/4/2019    COLONOSCOPY CONTROL HEMORRHAGE performed by Nahed Florian MD at [address] [place] [address]  6/4/2019    polypectomy per hot snare performed by Nahed Florian MD at [address] [place] [address] EXTRACTION       Social History     Tobacco Use    Smoking status: Former Smoker     Years: 20.00     Quit date: 2002     Years since quittin.3    Smokeless tobacco: Never Used   Substance Use Topics    Alcohol use: Not Currently     Alcohol/week: 0.0 standard drinks     Comment: occasionally       Current Outpatient Medications on File Prior to Visit   Medication Sig Dispense Refill    Multiple Vitamins-Minerals (THERAPEUTIC MULTIVITAMIN-MINERALS) tablet Take 1 tablet by mouth daily       No current facility-administered medications on file prior to visit. ASCVD 10-YEAR RISK SCORE  The 10-year ASCVD risk score (Manjula Ibarra, et al., 2013) is: 1.4%    Values used to calculate the score:      Age: 50 years      Sex: Female      Is Non- : No      Diabetic: No      Tobacco smoker: No      Systolic Blood Pressure: 357 mmHg      Is BP treated: No      HDL Cholesterol: 51 mg/dL      Total Cholesterol: 206 mg/dL     Review of Systems  10-point ROS is negative other than HPI. Physical Exam  Based off 1997 Exam Criteria  Ht 5' 7\" (1.702 m)   Wt 181 lb (82.1 kg)   BMI 28.35 kg/m²      Constitutional:       General: He is not in acute distress. Appearance: Normal appearance. Cardiovascular:      Rate and Rhythm: Normal rate and regular rhythm. Pulses: Normal pulses. Pulmonary:      Effort: Pulmonary effort is normal. No respiratory distress. Neurological:      Mental Status: He is alert and oriented to person, place, and time. Mental status is at baseline.      Musculoskeletal:  Gait:  normal    Cervical Spine / Shoulder:      RIGHT  LEFT    Cervical Spine Exam  [] All Neg    [] All Neg     Spurling's  []  []Not tested   []  []Not tested    Thomas's  []  []Not tested   []  []Not tested    Pain with rotation  []  []Not tested   []  []Not tested    Pain with lateral bending  []  []Not tested   []  []Not tested    Paraspinal muscle tenderness  [] Paraspinal  []Midline   [] Paraspinal  []Not tested    Sensation RIGHT  LEFT    Axillary  [x] Normal []Decreased conservative measures (rest, icing, activity modification, physical therapy, pain meds, cortisone injection) to surgical options. In terms of treatment, I recommended continuing with rest, icing, avoidance of painful activities, NSAIDs or pain meds as tolerated, and physical therapy. We can hold off on cortisone injection for today. She is already clinically better. I prescribed 2 Medrol packs, to be taken after her allergy testing is completed on 6/21/2021. We discussed surgical options as well, should conservative measures fail. Likelihood of requiring surgery here is low. Electronically signed by Holly Hand MD on 6/10/2021 at 9:17 AM  This dictation was generated by voice recognition computer software. Although all attempts are made to edit the dictation for accuracy, there may be errors in the transcription that are not intended.

## 2021-06-11 ENCOUNTER — HOSPITAL ENCOUNTER (OUTPATIENT)
Dept: PHYSICAL THERAPY | Age: 56
Setting detail: THERAPIES SERIES
Discharge: HOME | End: 2021-06-11
Payer: COMMERCIAL

## 2021-06-11 PROCEDURE — 97112 NEUROMUSCULAR REEDUCATION: CPT | Performed by: PHYSICAL THERAPIST

## 2021-06-11 PROCEDURE — 97140 MANUAL THERAPY 1/> REGIONS: CPT | Performed by: PHYSICAL THERAPIST

## 2021-06-11 PROCEDURE — 97110 THERAPEUTIC EXERCISES: CPT | Performed by: PHYSICAL THERAPIST

## 2021-06-11 NOTE — FLOWSHEET NOTE
[facility], [address]  Phone: [phone]  Fax [phone]        Date:  2021    Patient Name:  Estefanía Skelton    :  1965  MRN: [id number]  Restrictions/Precautions:    Medical/Treatment Diagnosis Information:  · Diagnosis: Left shoulder pain M25.512  · Treatment Diagnosis: L shoulder pain and stiffness  Insurance/Certification information:  PT Insurance Information: [employer] [place]- no auth, no co pay, 40 visits, no telehealth  Physician Information:  Referring Practitioner: Erin Coronel  Has the plan of care been signed (Y/N):        []? Yes                    [x]? No       Date of Patient follow up with Physician: 2 weeks for injection      Is this a Progress Report:     []  Yes  [x]  No        If Yes:  Date Range for reporting period:  Beginning 2021  Ending 2021    Progress report will be due (10 Rx or 30 days whichever is less): see above       Recertification will be due (POC Duration  / 90 days whichever is less): 8 weeks from IE ()     Visit # Insurance Allowable Auth Required   In-person 6 40 []  Yes [x]  No    Telehealth X X []  Yes []  No    Total            Functional Scale: 32% Q DASH    Date assessed:  2021      Therapy Diagnosis/Practice Pattern: L shoulder pain/ stiffness/ C      Number of Comorbidities:  []0     [x]1-2    []3+    Latex Allergy:  [x]NO      []YES  Preferred Language for Healthcare:   [x]English       []other:      Pain level:  3/10     SUBJECTIVE:  Pt states that she saw MD who is pleased with progress and wants her to continue PT. No injection required. Feels ROM is improving.      OBJECTIVE:    Observation:    Test measurements:      RESTRICTIONS/PRECAUTIONS: none    Exercises/Interventions:     Therapeutic Ex (98611) Sets/sec Reps Notes/CUES   Pulley: flex/ scaption H5 15     Table slide H 10 10    Attempted pec stretching but too painful in superior/ post capsule   IR strap (S) H30 3    Child's pose  +lat bias  H 30 3 ea    Wall slide: 11, 12, 1 15 ea     Cane flex  + hip bridge  10 10    [address] (s) 30 3    No Money GTB 20 reps    Push up at wall- 3D  10 ea     SL ABD  SL H ABD  SL ER 5\" 15 ea       Prayer (s)  10\" x 10    Manual Intervention (32278)      PROM, joint mob- post/ inf glides GH; pec major/ minor (s)   10 mins                                   NMR re-education (93655)   CUES NEEDED   Prone B scap retract + shoulder ext  + MT H 10  H 5 15  15       TB ext (GTB) 2 15    OVL retract at wall  LBW 2  12 B  3 laps    D1/2 flex double arm RTB 2 x 12 ea    TB H ABD RTB 3 x 10 Low anchor                     Therapeutic Activity (72883)                                              Therapeutic Exercise and NMR EXR  [x] (29324) Provided verbal/tactile cueing for activities related to strengthening, flexibility, endurance, ROM  for improvements in scapular, scapulothoracic and UE control with self care, reaching, carrying, lifting, house/yardwork, driving/computer work. [x] (83381) Provided verbal/tactile cueing for activities related to improving balance, coordination, kinesthetic sense, posture, motor skill, proprioception  to assist with  scapular, scapulothoracic and UE control with self care, reaching, carrying, lifting, house/yardwork, driving/computer work. Therapeutic Activities:    [x] (64415 or 58248) Provided verbal/tactile cueing for activities related to improving balance, coordination, kinesthetic sense, posture, motor skill, proprioception and motor activation to allow for proper function of scapular, scapulothoracic and UE control with self care, carrying, lifting, driving/computer work.      Home Exercise Program:    [x] (61205) Reviewed/Progressed HEP activities related to strengthening, flexibility, endurance, ROM of scapular, scapulothoracic and UE control with self care, reaching, carrying, lifting, house/yardwork, driving/computer work  [x] (28224) Reviewed/Progressed HEP activities related to improving balance, coordination, kinesthetic sense, posture, motor skill, proprioception of scapular, scapulothoracic and UE control with self care, reaching, carrying, lifting, house/yardwork, driving/computer work      Manual Treatments:  PROM / STM / Oscillations-Mobs:  G-I, II, III, IV (PA's, Inf., Post.)  [x] (86822) Provided manual therapy to mobilize soft tissue/joints of cervical/CT, scapular GHJ and UE for the purpose of modulating pain, promoting relaxation,  increasing ROM, reducing/eliminating soft tissue swelling/inflammation/restriction, improving soft tissue extensibility and allowing for proper ROM for normal function with self care, reaching, carrying, lifting, house/yardwork, driving/computer work    Modalities:      Charges  Timed Code Treatment Minutes: 45   Total Treatment Minutes: 45       [x] EVAL (LOW) 95231   [] EVAL (MOD) 85777   [] EVAL (HIGH) 41216   [] RE-EVAL     [x] HR(03869) x 1    [] IONTO  [x] NMR (23690) x  1   [] VASO  [x] Manual (61304) x 1     [] Other:  [] TA x      [] Mech Traction (95246)  [] ES(attended) (97482)      [] ES (un) (33359): Access Code: [email or web address]  URL: [email or web address]  Date: 06/04/2021  Prepared by: Marleen Pinto    Exercises  Shoulder Flexion Wall Slide with Towel - 1 x daily - 7 x weekly - 10 reps  Supine Shoulder Flexion with Dowel - 2-3 x daily - 7 x weekly - 10 reps - 10 hold  Standing Shoulder External Rotation AAROM with Dowel - 2-3 x daily - 7 x weekly - 10 reps - 10 hold  Supine Shoulder External Rotation in 45 Degrees Abduction AAROM with Dowel - 1 x daily - 7 x weekly - 3 sets - 10 reps - 10 hold  Sidelying Shoulder Abduction Palm Forward - 1 x daily - 7 x weekly - 15 reps - 5 hold  Sidelying Shoulder External Rotation with Dumbbell - 1 x daily - 7 x weekly - 15 reps - 5 hold  Prone Scapular Slide with Shoulder Extension - 2-3 x daily - 7 x weekly - 10 reps - 5 hold  Standing Shoulder Internal Rotation Stretch with Towel - 1 x daily - 7 x weekly - 3 sets - 30 hold      GOALS:  Patient stated goal: improve motion, return to swimming  []? Progressing: []? Met: []? Not Met: []? Adjusted     Therapist goals for Patient:   Short Term Goals: To be achieved in: 2 weeks  1. Independent in HEP and progression per patient tolerance, in order to prevent re-injury. []? Progressing: []? Met: []? Not Met: []? Adjusted  2. Patient will have a decrease in pain to facilitate improvement in movement, function, and ADLs as indicated by Functional Deficits. []? Progressing: []? Met: []? Not Met: []? Adjusted     Long Term Goals: To be achieved in: 8 weeks  1. Disability index score of 16% or less for the [facility] to assist with reaching prior level of function. []? Progressing: []? Met: []? Not Met: []? Adjusted  2. Patient will demonstrate increased AROM to [facility] to allow for proper joint functioning as indicated by patients Functional Deficits. []? Progressing: []? Met: []? Not Met: []? Adjusted  3. Patient will demonstrate an increase in Strength to 4/5 to allow for proper functional mobility as indicated by patients Functional Deficits. []? Progressing: []? Met: []? Not Met: []? Adjusted  4. Patient will return to 75% functional activities without increased symptoms or restriction. []? Progressing: []? Met: []? Not Met: []? Adjusted  5. Pt will be able to reach behind her back and behind her head for non adapted hyigene strategies. (patient specific functional goal)    []? Progressing: []? Met: []? Not Met: []? Adjusted            Progression Towards Functional goals:  [x] Patient is progressing as expected towards functional goals listed. [] Progression is slowed due to complexities listed. [] Progression has been slowed due to co-morbidities.   [] Plan just implemented, too soon to assess goals progression  [] Other:     ASSESSMENT:  Pt exhibits progress in ROM this date. Pt fatigued after tx. Updated HEP. Pt requires skilled intervention to restore ROM, strength, functional endurance and balance in order to perform ADLs without significant symptoms or limitations. Overall Progression Towards Functional goals/ Treatment Progress Update:  [x] Patient is progressing as expected towards functional goals listed. [] Progression is slowed due to complexities/Impairments listed. [] Progression has been slowed due to co-morbidities. [] Plan just implemented, too soon to assess goals progression <30days   [] Goals require adjustment due to lack of progress  [] Patient is not progressing as expected and requires additional follow up with physician  [] Other    Prognosis for POC: [x] Good [] Fair  [] Poor      Patient requires continued skilled intervention: [x] Yes  [] No    Treatment/Activity Tolerance:  [x] Patient able to complete treatment  [] Patient limited by fatigue  [] Patient limited by pain    [] Patient limited by other medical complications  [] Other:         PLAN: Continue PT 1-2 x per week. [x] Continue per plan of care [] Alter current plan (see comments above)  [] Plan of care initiated [] Hold pending MD visit [] Discharge      Electronically signed by:  Gene Rebollar PT [id number]    Note: If patient does not return for scheduled/ recommended follow up visits, this note will serve as a discharge from care along with most recent update on progress.

## 2021-06-15 ENCOUNTER — HOSPITAL ENCOUNTER (OUTPATIENT)
Dept: PHYSICAL THERAPY | Age: 56
Setting detail: THERAPIES SERIES
Discharge: HOME | End: 2021-06-15
Payer: COMMERCIAL

## 2021-06-15 PROCEDURE — 97112 NEUROMUSCULAR REEDUCATION: CPT | Performed by: PHYSICAL THERAPIST

## 2021-06-15 PROCEDURE — 97140 MANUAL THERAPY 1/> REGIONS: CPT | Performed by: PHYSICAL THERAPIST

## 2021-06-15 PROCEDURE — 97110 THERAPEUTIC EXERCISES: CPT | Performed by: PHYSICAL THERAPIST

## 2021-06-15 NOTE — FLOWSHEET NOTE
[facility],  [address]  Phone: [phone]  Fax [phone]        Date:  6/15/2021    Patient Name:  Carlos Silvestre    :  1965  MRN: [id number]  Restrictions/Precautions:    Medical/Treatment Diagnosis Information:  · Diagnosis: Left shoulder pain M25.512  · Treatment Diagnosis: L shoulder pain and stiffness  Insurance/Certification information:  PT Insurance Information: [employer] [place]- no auth, no co pay, 40 visits, no telehealth  Physician Information:  Referring Practitioner: Phoebe Nunn  Has the plan of care been signed (Y/N):        []? Yes                    [x]? No       Date of Patient follow up with Physician: 2 weeks for injection      Is this a Progress Report:     []  Yes  [x]  No        If Yes:  Date Range for reporting period:  Beginning 2021  Ending 2021    Progress report will be due (10 Rx or 30 days whichever is less): see above       Recertification will be due (POC Duration  / 90 days whichever is less): 8 weeks from IE ()     Visit # Insurance Allowable Auth Required   In-person 7 40 []  Yes [x]  No    Telehealth X X []  Yes []  No    Total            Functional Scale: 32% Q DASH    Date assessed:  2021      Therapy Diagnosis/Practice Pattern: L shoulder pain/ stiffness/ C      Number of Comorbidities:  []0     [x]1-2    []3+    Latex Allergy:  [x]NO      []YES  Preferred Language for Healthcare:   [x]English       []other:      Pain level:  3/10     SUBJECTIVE:  Pt states shoulder is feeling better. Reaching behind overhead/ back, driving is improving. She is still favoring her arm when lifting heavy things, more difficult to lift half gallon of milk but not painful.      OBJECTIVE:    Observation:    Test measurements: PROM ER 60      RESTRICTIONS/PRECAUTIONS: none    Exercises/Interventions:     Therapeutic Ex (97106) Sets/sec Reps Notes/CUES   Pulley: flex/ scaption H5 15     Table slide H 10 10    Attempted pec stretching but too painful in superior/ post capsule   IR strap (S) H30 3    Child's pose  +lat bias  H 30 3 ea    Wall slide: 11, 12, 1 15 ea     Cane flex  + hip bridge  10 10    [address] (s) 30 3    Supine OVL flex  No money OVL  2  10 ea     Push up at wall- 3D  10 ea     SL ABD  SL H ABD  SL ER 5\" 15 ea       Prayer (s)  10\" x 10    Manual Intervention (61522)      PROM, joint mob- post/ inf glides GH; pec major/ minor (s)   10 mins                                   NMR re-education (71997)   CUES NEEDED   Prone B scap retract + shoulder ext  + MT H 10  H 5 15  15       TB ext (GTB) 3 15    OVL retract at wall  LBW  Lift off  2  10 B  3 laps  10 ea     D1/2 flex double arm RTB 2 x 12 ea    TB H ABD RTB 3 x 10 Low anchor         TB protract/ retract 3  10 GTB         Therapeutic Activity (97957)                                              Therapeutic Exercise and NMR EXR  [x] (25726) Provided verbal/tactile cueing for activities related to strengthening, flexibility, endurance, ROM  for improvements in scapular, scapulothoracic and UE control with self care, reaching, carrying, lifting, house/yardwork, driving/computer work. [x] (62202) Provided verbal/tactile cueing for activities related to improving balance, coordination, kinesthetic sense, posture, motor skill, proprioception  to assist with  scapular, scapulothoracic and UE control with self care, reaching, carrying, lifting, house/yardwork, driving/computer work. Therapeutic Activities:    [x] (33391 or 10033) Provided verbal/tactile cueing for activities related to improving balance, coordination, kinesthetic sense, posture, motor skill, proprioception and motor activation to allow for proper function of scapular, scapulothoracic and UE control with self care, carrying, lifting, driving/computer work.      Home Exercise Program:    [x] (74691) Reviewed/Progressed HEP activities related to strengthening, flexibility, endurance, ROM of scapular, scapulothoracic and UE control with self care, reaching, carrying, lifting, house/yardwork, driving/computer work  [x] (73274) Reviewed/Progressed HEP activities related to improving balance, coordination, kinesthetic sense, posture, motor skill, proprioception of scapular, scapulothoracic and UE control with self care, reaching, carrying, lifting, house/yardwork, driving/computer work      Manual Treatments:  PROM / STM / Oscillations-Mobs:  G-I, II, III, IV (PA's, Inf., Post.)  [x] (06702) Provided manual therapy to mobilize soft tissue/joints of cervical/CT, scapular GHJ and UE for the purpose of modulating pain, promoting relaxation,  increasing ROM, reducing/eliminating soft tissue swelling/inflammation/restriction, improving soft tissue extensibility and allowing for proper ROM for normal function with self care, reaching, carrying, lifting, house/yardwork, driving/computer work    Modalities:      Charges  Timed Code Treatment Minutes: 45   Total Treatment Minutes: 45       [x] EVAL (LOW) 62147   [] EVAL (MOD) 14206   [] EVAL (HIGH) 62607   [] RE-EVAL     [x] DQ(06422) x 1    [] IONTO  [x] NMR (85005) x  1   [] VASO  [x] Manual (31303) x 1     [] Other:  [] TA x      [] Mech Traction (23329)  [] ES(attended) (64538)      [] ES (un) (33650): Access Code: [email or web address]  URL: [email or web address]  Date: 06/04/2021  Prepared by: Ankit Truong    Exercises  Shoulder Flexion Wall Slide with Towel - 1 x daily - 7 x weekly - 10 reps  Supine Shoulder Flexion with Dowel - 2-3 x daily - 7 x weekly - 10 reps - 10 hold  Standing Shoulder External Rotation AAROM with Dowel - 2-3 x daily - 7 x weekly - 10 reps - 10 hold  Supine Shoulder External Rotation in 45 Degrees Abduction AAROM with Dowel - 1 x daily - 7 x weekly - 3 sets - 10 reps - 10 hold  Sidelying Shoulder Abduction Palm Forward - 1 x daily - 7 x weekly - 15 reps - 5 hold  Sidelying Shoulder External Rotation with Dumbbell - 1 x daily - 7 x weekly - 15 reps - 5 hold  Prone Scapular Slide with Shoulder Extension - 2-3 x daily - 7 x weekly - 10 reps - 5 hold  Standing Shoulder Internal Rotation Stretch with Towel - 1 x daily - 7 x weekly - 3 sets - 30 hold      GOALS:  Patient stated goal: improve motion, return to swimming  []? Progressing: []? Met: []? Not Met: []? Adjusted     Therapist goals for Patient:   Short Term Goals: To be achieved in: 2 weeks  1. Independent in HEP and progression per patient tolerance, in order to prevent re-injury. [x]? Progressing: []? Met: []? Not Met: []? Adjusted  2. Patient will have a decrease in pain to facilitate improvement in movement, function, and ADLs as indicated by Functional Deficits. [x]? Progressing: []? Met: []? Not Met: []? Adjusted     Long Term Goals: To be achieved in: 8 weeks  1. Disability index score of 16% or less for the [facility] to assist with reaching prior level of function. []? Progressing: []? Met: []? Not Met: []? Adjusted  2. Patient will demonstrate increased AROM to [facility] to allow for proper joint functioning as indicated by patients Functional Deficits. [x]? Progressing: []? Met: []? Not Met: []? Adjusted  3. Patient will demonstrate an increase in Strength to 4/5 to allow for proper functional mobility as indicated by patients Functional Deficits. [x]? Progressing: []? Met: []? Not Met: []? Adjusted  4. Patient will return to 75% functional activities without increased symptoms or restriction. [x]? Progressing: []? Met: []? Not Met: []? Adjusted  5. Pt will be able to reach behind her back and behind her head for non adapted hyigene strategies. (patient specific functional goal)    [x]? Progressing: []? Met: []? Not Met: []? Adjusted            Progression Towards Functional goals:  [x] Patient is progressing as expected towards functional goals listed. [] Progression is slowed due to complexities listed.   [] Progression has been slowed due to co-morbidities. [] Plan just implemented, too soon to assess goals progression  [] Other:     ASSESSMENT:  Pt exhibits progress in ROM this date. Pt fatigued after tx. Updated HEP. Pt requires skilled intervention to restore ROM, strength, functional endurance and balance in order to perform ADLs without significant symptoms or limitations. Overall Progression Towards Functional goals/ Treatment Progress Update:  [x] Patient is progressing as expected towards functional goals listed. [] Progression is slowed due to complexities/Impairments listed. [] Progression has been slowed due to co-morbidities. [] Plan just implemented, too soon to assess goals progression <30days   [] Goals require adjustment due to lack of progress  [] Patient is not progressing as expected and requires additional follow up with physician  [] Other    Prognosis for POC: [x] Good [] Fair  [] Poor      Patient requires continued skilled intervention: [x] Yes  [] No    Treatment/Activity Tolerance:  [x] Patient able to complete treatment  [] Patient limited by fatigue  [] Patient limited by pain    [] Patient limited by other medical complications  [] Other:         PLAN: Continue PT 1-2 x per week. [x] Continue per plan of care [] Alter current plan (see comments above)  [] Plan of care initiated [] Hold pending MD visit [] Discharge      Electronically signed by:  Karla Albarado, PT [id number]    Note: If patient does not return for scheduled/ recommended follow up visits, this note will serve as a discharge from care along with most recent update on progress.

## 2021-06-18 ENCOUNTER — HOSPITAL ENCOUNTER (OUTPATIENT)
Dept: PHYSICAL THERAPY | Age: 56
Setting detail: THERAPIES SERIES
Discharge: HOME | End: 2021-06-18
Payer: COMMERCIAL

## 2021-06-18 PROCEDURE — 97112 NEUROMUSCULAR REEDUCATION: CPT | Performed by: PHYSICAL THERAPIST

## 2021-06-18 PROCEDURE — 97110 THERAPEUTIC EXERCISES: CPT | Performed by: PHYSICAL THERAPIST

## 2021-06-18 PROCEDURE — 97140 MANUAL THERAPY 1/> REGIONS: CPT | Performed by: PHYSICAL THERAPIST

## 2021-06-18 NOTE — FLOWSHEET NOTE
[facility], [address]  Phone: [phone]  Fax [phone]        Date:  2021    Patient Name:  Rob Oliveira    :  1965  MRN: [id number]  Restrictions/Precautions:    Medical/Treatment Diagnosis Information:  · Diagnosis: Left shoulder pain M25.512  · Treatment Diagnosis: L shoulder pain and stiffness  Insurance/Certification information:  PT Insurance Information: Medical [place]- no auth, no co pay, 40 visits, no telehealth  Physician Information:  Referring Practitioner: Lane Jones  Has the plan of care been signed (Y/N):        []? Yes                    [x]? No       Date of Patient follow up with Physician: 2 weeks for injection      Is this a Progress Report:     []  Yes  [x]  No        If Yes:  Date Range for reporting period:  Beginning 2021  Ending 2021    Progress report will be due (10 Rx or 30 days whichever is less): see above       Recertification will be due (POC Duration  / 90 days whichever is less): 8 weeks from IE ()     Visit # Insurance Allowable Auth Required   In-person 7 40 []  Yes [x]  No    Telehealth X X []  Yes []  No    Total            Functional Scale: 32% Q DASH    Date assessed:  2021      Therapy Diagnosis/Practice Pattern: L shoulder pain/ stiffness/ C      Number of Comorbidities:  []0     [x]1-2    []3+    Latex Allergy:  [x]NO      []YES  Preferred Language for Healthcare:   [x]English       []other:      Pain level:  3/10     SUBJECTIVE:  Pt states shoulder is tired and sore from weed eating in the yard. More in UT and hands than shoulder.      OBJECTIVE:    Observation:    Test measurements: PROM ER 60      RESTRICTIONS/PRECAUTIONS: none    Exercises/Interventions:     Therapeutic Ex (65325) Sets/sec Reps Notes/CUES   Pulley: flex/ scaption H5 15     Table slide- ER H 30 5    Attempted pec stretching but too painful in superior/ post capsule   IR strap (S)  Post capsule (s)/ Sleeper (s) H30 5  3 ea    Child's pose  +lat bias  H 30 3 ea    Wall slide: 11, 12, 1 15 ea     Cane flex  + hip bridge  10 10    [address] (s) 30 5    Supine OVL flex  No money OVL  2  10 ea     Push up at wall- 3D  10 ea       SL ER (2#) 5\"    3 15 ea    8       Prayer (s)  10\" x 10    Manual Intervention (22122)      PROM, joint mob- post/ inf glides GH; pec major/ minor (s)   10 mins                                   NMR re-education (09821)   CUES NEEDED   Prone B scap retract + shoulder ext  + MT H 10  H 5 15  15       TB ext (GTB) 3 15    OVL retract at wall  LBW  Lift off  2  10 B  3 laps  10 ea     D1/2 flex double arm RTB 2 x 12 ea    TB H ABD RTB 3 x 10 Low anchor         TB protract/ retract 3  10 GTB         Therapeutic Activity (30462)                                              Therapeutic Exercise and NMR EXR  [x] (20116) Provided verbal/tactile cueing for activities related to strengthening, flexibility, endurance, ROM  for improvements in scapular, scapulothoracic and UE control with self care, reaching, carrying, lifting, house/yardwork, driving/computer work. [x] (16310) Provided verbal/tactile cueing for activities related to improving balance, coordination, kinesthetic sense, posture, motor skill, proprioception  to assist with  scapular, scapulothoracic and UE control with self care, reaching, carrying, lifting, house/yardwork, driving/computer work. Therapeutic Activities:    [x] (92511 or 32785) Provided verbal/tactile cueing for activities related to improving balance, coordination, kinesthetic sense, posture, motor skill, proprioception and motor activation to allow for proper function of scapular, scapulothoracic and UE control with self care, carrying, lifting, driving/computer work.      Home Exercise Program:    [x] (77474) Reviewed/Progressed HEP activities related to strengthening, flexibility, endurance, ROM of scapular, reps - 5 hold  Prone Scapular Slide with Shoulder Extension - 2-3 x daily - 7 x weekly - 10 reps - 5 hold  Standing Shoulder Internal Rotation Stretch with Towel - 1 x daily - 7 x weekly - 3 sets - 30 hold      GOALS:  Patient stated goal: improve motion, return to swimming  []? Progressing: []? Met: []? Not Met: []? Adjusted     Therapist goals for Patient:   Short Term Goals: To be achieved in: 2 weeks  1. Independent in HEP and progression per patient tolerance, in order to prevent re-injury. [x]? Progressing: []? Met: []? Not Met: []? Adjusted  2. Patient will have a decrease in pain to facilitate improvement in movement, function, and ADLs as indicated by Functional Deficits. [x]? Progressing: []? Met: []? Not Met: []? Adjusted     Long Term Goals: To be achieved in: 8 weeks  1. Disability index score of 16% or less for the [facility] to assist with reaching prior level of function. []? Progressing: []? Met: []? Not Met: []? Adjusted  2. Patient will demonstrate increased AROM to [facility] to allow for proper joint functioning as indicated by patients Functional Deficits. [x]? Progressing: []? Met: []? Not Met: []? Adjusted  3. Patient will demonstrate an increase in Strength to 4/5 to allow for proper functional mobility as indicated by patients Functional Deficits. [x]? Progressing: []? Met: []? Not Met: []? Adjusted  4. Patient will return to 75% functional activities without increased symptoms or restriction. [x]? Progressing: []? Met: []? Not Met: []? Adjusted  5. Pt will be able to reach behind her back and behind her head for non adapted hyigene strategies. (patient specific functional goal)    [x]? Progressing: []? Met: []? Not Met: []? Adjusted            Progression Towards Functional goals:  [x] Patient is progressing as expected towards functional goals listed. [] Progression is slowed due to complexities listed. [] Progression has been slowed due to co-morbidities.   [] Plan just

## 2021-06-21 ENCOUNTER — NURSE ONLY (OUTPATIENT)
Dept: ENT CLINIC | Age: 56
End: 2021-06-21
Payer: COMMERCIAL

## 2021-06-21 VITALS — DIASTOLIC BLOOD PRESSURE: 82 MMHG | SYSTOLIC BLOOD PRESSURE: 131 MMHG | HEART RATE: 96 BPM | TEMPERATURE: 96.8 F

## 2021-06-21 DIAGNOSIS — J30.89 NON-SEASONAL ALLERGIC RHINITIS, UNSPECIFIED TRIGGER: ICD-10-CM

## 2021-06-21 PROCEDURE — 95024 IQ TESTS W/ALLERGENIC XTRCS: CPT | Performed by: STUDENT IN AN ORGANIZED HEALTH CARE EDUCATION/TRAINING PROGRAM

## 2021-06-21 PROCEDURE — 95004 PERQ TESTS W/ALRGNC XTRCS: CPT | Performed by: STUDENT IN AN ORGANIZED HEALTH CARE EDUCATION/TRAINING PROGRAM

## 2021-06-21 NOTE — PROGRESS NOTES
Allergy Testing Note        After obtaining consent, and per orders of Dr Clint Evans, injections of allergy serum given per documentation below by GUS Manriquez RN. Test Information  Consent: Yes  Time Antigens Placed: 4359  Location: Arm (left)  Allergen : ALK Su  Testing Nurse: GUS Manriquez RN  Reviewing Physician: Alisha  Amount Injected (mL): 0.01    Controls  Negative 0.05% Glycerine-Saline: Not tested  Positive Histamine 1 mg/ml: Not tested    Maria Elena [place]: Not tested  Paper Pilar Hayder: Not tested  [place]: 0  Red [place]: 0  American Elm: 0  [place]: Not tested  Mikhail Lisa: Not tested  Sugar Maple: Not tested  [employer] [place]: 0  Bur [place]: Not tested  Ch[employer]jini Corporation: Not tested  Sealed Air Corporation: Not tested  [employer] Corporation: 0  Black [address]: Not tested  Mikhail Lisa: Not tested  [employer]: 0  [employer] Birch: 0  Hughes Eastern: 0  [place]/Pecan Mix: 0  Red Maple: 0  White Oak: 0    Others  [employer] Family Insurance Mite: 4+  Dog Dander: 0  Cat Dander: 3+  Feather (Mixed): 0  Cockroach: 0    Grasses  Ky Bluegrass: Not tested  Smooth [place]: Not tested  [place]: Not tested  Selma's Pride Fescue: Not tested  Selma's Pride Fescue: 3+  Luxembourg [place]: Not tested  [place] List: 3+  B[place]: 3+  Jone: 3+    Weeds  Cocklebur: Not tested  Lamb's Quarter: 0  Burweed Common: Not tested  Mugwort: Not tested  English Plantain: 0  Giant Ragweed: Not tested  Short Ragweed: Not tested  Sheep Sorrel: 0  Kochia: 0  Red Root Pigweed: 0 (ROUGH PIGWEED-negative)  Mixed Ragweed: 0    Molds/Fungi  Alternaria Hormodendrum: 0  Dreschlera: Not tested  Epicoccum Nigrum: 0  Epidermorphyto Floccosum: Not tested  Fusarium Moniliforme: Not tested  Fusarium Solani: Not tested  Geotrichum Candidum: Not tested  Gliocladium: Not tested  Deliquescence: Not tested  Spondylocladium: Not tested  Atrovirens Microsporum: Not tested  Phoma Betae: Not tested  Rhizopus Oryzae: Not tested  Rhodotorula: 0  Saccharomyces Cerevisiae : Not tested  Stemphylium Solani: Not tested  Trichoderma Viride: Not tested  Trichophyton Mentagrophytes: Not tested  Cephalothecium Roseum: Not tested  Acremonium Strictum: Not tested  Aspergillus Flavus: Not tested  Aspergillus Fumigatus: 0  Aspergillus [place]: Not tested  Aureobasidium Pullulans: 0  Bipolaris Sorokiniana: 0  Candida Albicans: 0  Chaetomium Glosbosum: Not tested  Cladosporium Cladosporioides: 0  Gibberella Pulicaris: 0  Mucor Plumbeus: 0  Penniccillum Notatum: 0     Pt has not started taking her Medrol dose pack yet since allergy testing today. Will start after testing completed. Juani Viera  instructed to remain in clinic for 30 minutes post testing and to report any adverse reactions immediately. No changes noted in patient status post testing. Patient without significant allergies. Sensitive to Cat hair, Dust Mite Mix, and grasses. She doesn't own a cat and is not around them. Instructions given on minimizing dust mite exposure and grass exposure. It is my opinion that she wouldn't benefit from any immunotherapy and patient agrees. Currently she isn't even having any allergy symptoms over the past 2-3 weeks. Patient will call and schedule appointment with Dr. Chin Panda if she has any further issues. Please review testing and advise.

## 2021-06-21 NOTE — Clinical Note
Please review the note and testing results scanned into the media tab. I don't think she will benefit from any type of allergy immunotherapy. Thank you.

## 2021-06-22 ENCOUNTER — HOSPITAL ENCOUNTER (OUTPATIENT)
Dept: PHYSICAL THERAPY | Age: 56
Setting detail: THERAPIES SERIES
Discharge: HOME | End: 2021-06-22
Payer: COMMERCIAL

## 2021-06-22 PROCEDURE — 97112 NEUROMUSCULAR REEDUCATION: CPT | Performed by: PHYSICAL THERAPIST

## 2021-06-22 PROCEDURE — 97110 THERAPEUTIC EXERCISES: CPT | Performed by: PHYSICAL THERAPIST

## 2021-06-22 PROCEDURE — 97140 MANUAL THERAPY 1/> REGIONS: CPT | Performed by: PHYSICAL THERAPIST

## 2021-06-22 NOTE — FLOWSHEET NOTE
[facility], [address] [place]  Phone: [phone]  Fax [phone]        Date:  2021    Patient Name:  Malathi Earl    :  1965  MRN: [id number]  Restrictions/Precautions:    Medical/Treatment Diagnosis Information:  · Diagnosis: Left shoulder pain M25.512  · Treatment Diagnosis: L shoulder pain and stiffness  Insurance/Certification information:  PT Insurance Information: Medical [place]- no auth, no co pay, 40 visits, no telehealth  Physician Information:  Referring Practitioner: Dolores Hernandez  Has the plan of care been signed (Y/N):        []? Yes                    [x]? No       Date of Patient follow up with Physician: 2 weeks for injection      Is this a Progress Report:     []  Yes  [x]  No        If Yes:  Date Range for reporting period:  Beginning 2021  Ending 2021    Progress report will be due (10 Rx or 30 days whichever is less): see above       Recertification will be due (POC Duration  / 90 days whichever is less): 8 weeks from IE ()     Visit # Insurance Allowable Auth Required   In-person 8 40 []  Yes [x]  No    Telehealth X X []  Yes []  No    Total            Functional Scale: 32% Q DASH    Date assessed:  2021      Therapy Diagnosis/Practice Pattern: L shoulder pain/ stiffness/ C      Number of Comorbidities:  []0     [x]1-2    []3+    Latex Allergy:  [x]NO      []YES  Preferred Language for Healthcare:   [x]English       []other:      Pain level:  0-2/10     SUBJECTIVE:  Reports overall 80% improvement with ADL's. Can reach acrossed and behind back easier to put on lotion.      OBJECTIVE:    Observation:    Test measurements: PROM ER 60    OBJECTIVE  Test used Initial score 21   Pain Summary  3/10 No pain just weakness   Functional questionnaire Quick Dash  32% 9%   Functional Testing Shld IR A/P Iliac crest/ 35 L1, WFL    Shld Flex A/P 138/155 155/ 165   ROM Shoulder External Rotation AAROM with Dowel - 2-3 x daily - 7 x weekly - 10 reps - 10 hold  Supine Shoulder External Rotation in 45 Degrees Abduction AAROM with Dowel - 1 x daily - 7 x weekly - 3 sets - 10 reps - 10 hold  Sidelying Shoulder Abduction Palm Forward - 1 x daily - 7 x weekly - 15 reps - 5 hold  Sidelying Shoulder External Rotation with Dumbbell - 1 x daily - 7 x weekly - 15 reps - 5 hold  Prone Scapular Slide with Shoulder Extension - 2-3 x daily - 7 x weekly - 10 reps - 5 hold  Standing Shoulder Internal Rotation Stretch with Towel - 1 x daily - 7 x weekly - 3 sets - 30 hold      GOALS:  Patient stated goal: improve motion, return to swimming  []? Progressing: []? Met: []? Not Met: []? Adjusted     Therapist goals for Patient:   Short Term Goals: To be achieved in: 2 weeks  1. Independent in HEP and progression per patient tolerance, in order to prevent re-injury. []? Progressing: [x]? Met: []? Not Met: []? Adjusted  2. Patient will have a decrease in pain to facilitate improvement in movement, function, and ADLs as indicated by Functional Deficits. [x]? Progressing: [x]? Met: []? Not Met: []? Adjusted     Long Term Goals: To be achieved in: 8 weeks  1. Disability index score of 16% or less for the [facility] to assist with reaching prior level of function. []? Progressing: [x]? Met: []? Not Met: []? Adjusted  2. Patient will demonstrate increased AROM to [facility] to allow for proper joint functioning as indicated by patients Functional Deficits. [x]? Progressing: []? Met: []? Not Met: []? Adjusted  3. Patient will demonstrate an increase in Strength to 4/5 to allow for proper functional mobility as indicated by patients Functional Deficits. [x]? Progressing: []? Met: []? Not Met: []? Adjusted  4. Patient will return to 75% functional activities without increased symptoms or restriction. []? Progressing: [x]? Met: []? Not Met: []? Adjusted  5.  Pt will be able to reach behind her back and behind her head for non adapted hyigene strategies. (patient specific functional goal)    [x]? Progressing: []? Met: []? Not Met: []? Adjusted            Progression Towards Functional goals:  [x] Patient is progressing as expected towards functional goals listed. [] Progression is slowed due to complexities listed. [] Progression has been slowed due to co-morbidities. [] Plan just implemented, too soon to assess goals progression  [] Other:     ASSESSMENT:  Pt continues to progress with regards to strength and ROM- decreased rotation of UE still noted and decreased endurance/strength. Pt requires skilled intervention to restore ROM, strength, functional endurance and balance in order to perform ADLs without significant symptoms or limitations. Overall Progression Towards Functional goals/ Treatment Progress Update:  [x] Patient is progressing as expected towards functional goals listed. [] Progression is slowed due to complexities/Impairments listed. [] Progression has been slowed due to co-morbidities. [] Plan just implemented, too soon to assess goals progression <30days   [] Goals require adjustment due to lack of progress  [] Patient is not progressing as expected and requires additional follow up with physician  [] Other    Prognosis for POC: [x] Good [] Fair  [] Poor      Patient requires continued skilled intervention: [x] Yes  [] No    Treatment/Activity Tolerance:  [x] Patient able to complete treatment  [] Patient limited by fatigue  [] Patient limited by pain    [] Patient limited by other medical complications  [] Other:         PLAN: Continue PT 1-2 x per week. Progress as tolerated.   [x] Continue per plan of care [] Alter current plan (see comments above)  [] Plan of care initiated [] Hold pending MD visit [] Discharge      Electronically signed by:  Ann Marie Mayfield, [address]    Note: If patient does not return for scheduled/ recommended follow up visits, this note will serve as a discharge from

## 2021-06-25 ENCOUNTER — HOSPITAL ENCOUNTER (OUTPATIENT)
Dept: PHYSICAL THERAPY | Age: 56
Setting detail: THERAPIES SERIES
Discharge: HOME | End: 2021-06-25
Payer: COMMERCIAL

## 2021-06-25 PROCEDURE — 97112 NEUROMUSCULAR REEDUCATION: CPT | Performed by: PHYSICAL THERAPIST

## 2021-06-25 PROCEDURE — 97140 MANUAL THERAPY 1/> REGIONS: CPT | Performed by: PHYSICAL THERAPIST

## 2021-06-25 PROCEDURE — 97110 THERAPEUTIC EXERCISES: CPT | Performed by: PHYSICAL THERAPIST

## 2021-06-25 NOTE — FLOWSHEET NOTE
[facility], [address] [place]  Phone: [phone]  Fax [phone]        Date:  2021    Patient Name:  Matt Tran    :  1965  MRN: [id number]  Restrictions/Precautions:    Medical/Treatment Diagnosis Information:  · Diagnosis: Left shoulder pain M25.512  · Treatment Diagnosis: L shoulder pain and stiffness  Insurance/Certification information:  PT Insurance Information: Medical [place]- no auth, no co pay, 40 visits, no telehealth  Physician Information:  Referring Practitioner: [place]  Has the plan of care been signed (Y/N):        []? Yes                    [x]? No       Date of Patient follow up with Physician: 2 weeks for injection      Is this a Progress Report:     []  Yes  [x]  No        If Yes:  Date Range for reporting period:  Beginning 21  Ending 21    Progress report will be due (10 Rx or 30 days whichever is less):       Recertification will be due (POC Duration  / 90 days whichever is less): 8 weeks from IE ()     Visit # Insurance Allowable Auth Required   In-person 9 40 []  Yes [x]  No    Telehealth X X []  Yes []  No    Total            Functional Scale: 32% Q DASH    Date assessed:  2021      Therapy Diagnosis/Practice Pattern: L shoulder pain/ stiffness/ C      Number of Comorbidities:  []0     [x]1-2    []3+    Latex Allergy:  [x]NO      []YES  Preferred Language for Healthcare:   [x]English       []other:      Pain level:  0-2/10     SUBJECTIVE:  Reports overall 80% improvement with ADL's. Can reach acrossed and behind back easier to put on lotion. Steroid pills have been helping with less pain during exercises. Has taken the steroids for 3 days so far. At times has been pain free with activity.     OBJECTIVE:    Observation:    Test measurements: PROM ER 60    OBJECTIVE  Test used Initial score 21   Pain Summary  3/10 No pain just weakness Functional questionnaire Quick Dash  32% 9%   Functional Testing Shld IR A/P Iliac crest/ 35 L1, WFL    Shld Flex A/P 138/155 155/ 165   ROM Shld Abd  A 98 A 175    Shld ER A/P T1/ P 35 T1, 65-68   Strength Shld flex/abd 4-/5, 3/5 4/5, 4-/5    Shld ER/IR 4/5, 5-/5 4/5, 5-/5      RESTRICTIONS/PRECAUTIONS: none    Exercises/Interventions:     Therapeutic Ex (45223) Sets/sec Reps Notes/CUES   Pulley: flex/ scaption H5 15     Table slide- ER H 30 5    Attempted pec stretching but too painful in superior/ post capsule   IR strap (S)  Post capsule (s)/ Sleeper (s) H30 5  3 ea    Child's pose  +lat bias  H 30 3 ea    Wall slide: 11, 12, 1    Cane flex  + hip bridge  10 10    [address] (s) 30 5    Supine OVL flex  No money OVL     Push up at wall- 3D  10 ea     SL ABD  SL H ABD 2#  SL ER (2#)   2  3   15 reps  10       Prayer (s)  10\" x 10Manual Intervention (70442)      PROM, joint mob- post/ inf glides GH; pec major/ minor (s)   15 mins                                   NMR re-education (01929)   CUES NEEDED   Prone B scap retract + shoulder ext  + MT H 10  H 5 15  15       TB ext (GTB) 3 15    OVL retract at wall  LBW  Lift off  2  10 B  3 laps  10 ea     D1/2 flex double arm    TB H ABD Low anchor       TB protract/ retract 3  10 GTB         TB flex/IR walkaways  2x10 reps    Therapeutic Activity (63766)                                              Therapeutic Exercise and NMR EXR  [x] (46804) Provided verbal/tactile cueing for activities related to strengthening, flexibility, endurance, ROM  for improvements in scapular, scapulothoracic and UE control with self care, reaching, carrying, lifting, house/yardwork, driving/computer work.     [x] (48985) Provided verbal/tactile cueing for activities related to improving balance, coordination, kinesthetic sense, posture, motor skill, proprioception  to assist with  scapular, scapulothoracic and UE control with self care, reaching, carrying, lifting, house/yardwork, driving/computer work. Therapeutic Activities:    [x] (98463 or 91659) Provided verbal/tactile cueing for activities related to improving balance, coordination, kinesthetic sense, posture, motor skill, proprioception and motor activation to allow for proper function of scapular, scapulothoracic and UE control with self care, carrying, lifting, driving/computer work. Home Exercise Program:    [x] (70095) Reviewed/Progressed HEP activities related to strengthening, flexibility, endurance, ROM of scapular, scapulothoracic and UE control with self care, reaching, carrying, lifting, house/yardwork, driving/computer work  [x] (55702) Reviewed/Progressed HEP activities related to improving balance, coordination, kinesthetic sense, posture, motor skill, proprioception of scapular, scapulothoracic and UE control with self care, reaching, carrying, lifting, house/yardwork, driving/computer work      Manual Treatments:  PROM / STM / Oscillations-Mobs:  G-I, II, III, IV (PA's, Inf., Post.)  [x] (80428) Provided manual therapy to mobilize soft tissue/joints of cervical/CT, scapular GHJ and UE for the purpose of modulating pain, promoting relaxation,  increasing ROM, reducing/eliminating soft tissue swelling/inflammation/restriction, improving soft tissue extensibility and allowing for proper ROM for normal function with self care, reaching, carrying, lifting, house/yardwork, driving/computer work    Modalities:  Deferred modalities. Charges  Timed Code Treatment Minutes: 48'   Total Treatment Minutes: 48'       [] EVAL (LOW) 455 1011   [] EVAL (MOD) 03774   [] EVAL (HIGH) 62820   [] RE-EVAL     [x] IO(19854) x 1  [] IONTO  [x] NMR (86183) x  1   [] VASO  [x] Manual (97175) x 1     [] Other:  [] TA x      [] Mech Traction (02962)  [] ES(attended) (96270)      [] ES (un) (83083): Access Code: [email or web address]  URL: [email or web address]  Date: 06/04/2021  Prepared by: Javan Bermudez    Exercises  Shoulder activities without increased symptoms or restriction. []? Progressing: [x]? Met: []? Not Met: []? Adjusted  5. Pt will be able to reach behind her back and behind her head for non adapted hyigene strategies. (patient specific functional goal)    [x]? Progressing: []? Met: []? Not Met: []? Adjusted            Progression Towards Functional goals:  [x] Patient is progressing as expected towards functional goals listed. [] Progression is slowed due to complexities listed. [] Progression has been slowed due to co-morbidities. [] Plan just implemented, too soon to assess goals progression  [] Other:     ASSESSMENT:  Pt continues to progress with regards to strength and ROM- decreased rotation of UE still noted and decreased endurance/strength. Pt requires skilled intervention to restore ROM, strength, functional endurance and balance in order to perform ADLs without significant symptoms or limitations. Overall Progression Towards Functional goals/ Treatment Progress Update:  [x] Patient is progressing as expected towards functional goals listed. [] Progression is slowed due to complexities/Impairments listed. [] Progression has been slowed due to co-morbidities. [] Plan just implemented, too soon to assess goals progression <30days   [] Goals require adjustment due to lack of progress  [] Patient is not progressing as expected and requires additional follow up with physician  [] Other    Prognosis for POC: [x] Good [] Fair  [] Poor      Patient requires continued skilled intervention: [x] Yes  [] No    Treatment/Activity Tolerance:  [x] Patient able to complete treatment  [] Patient limited by fatigue  [] Patient limited by pain    [] Patient limited by other medical complications  [] Other:         PLAN: Continue PT 1-2 x per week. Progress as tolerated.   [x] Continue per plan of care [] Alter current plan (see comments above)  [] Plan of care initiated [] Hold pending MD visit [] Discharge      Electronically signed by:  Renetta Ernst, [address] [facility]    Note: If patient does not return for scheduled/ recommended follow up visits, this note will serve as a discharge from care along with most recent update on progress.

## 2021-06-29 ENCOUNTER — HOSPITAL ENCOUNTER (OUTPATIENT)
Dept: PHYSICAL THERAPY | Age: 56
Setting detail: THERAPIES SERIES
Discharge: HOME | End: 2021-06-29
Payer: COMMERCIAL

## 2021-06-29 PROCEDURE — 97110 THERAPEUTIC EXERCISES: CPT | Performed by: PHYSICAL THERAPIST

## 2021-06-29 PROCEDURE — 97112 NEUROMUSCULAR REEDUCATION: CPT | Performed by: PHYSICAL THERAPIST

## 2021-06-29 PROCEDURE — 97140 MANUAL THERAPY 1/> REGIONS: CPT | Performed by: PHYSICAL THERAPIST

## 2021-06-29 NOTE — FLOWSHEET NOTE
[facility],  [address]  Phone: [phone]  Fax [phone]        Date:  2021    Patient Name:  Dara Dave    :  1965  MRN: [id number]  Restrictions/Precautions:    Medical/Treatment Diagnosis Information:  · Diagnosis: Left shoulder pain M25.512  · Treatment Diagnosis: L shoulder pain and stiffness  Insurance/Certification information:  PT Insurance Information: Medical [place]- no auth, no co pay, 40 visits, no telehealth  Physician Information:  Referring Practitioner: Rosanna Mack  Has the plan of care been signed (Y/N):        []? Yes                    [x]? No       Date of Patient follow up with Physician: 2 weeks for injection      Is this a Progress Report:     []  Yes  [x]  No        If Yes:  Date Range for reporting period:  Beginning 21  Ending 21    Progress report will be due (10 Rx or 30 days whichever is less):       Recertification will be due (POC Duration  / 90 days whichever is less): 8 weeks from IE (-)     Visit # Insurance Allowable Auth Required   In-person 10 40 []  Yes [x]  No    Telehealth X X []  Yes []  No    Total            Functional Scale: 9% Q DASH    Date assessed: 21     Therapy Diagnosis/Practice Pattern: L shoulder pain/ stiffness/ C      Number of Comorbidities:  []0     [x]1-2    []3+    Latex Allergy:  [x]NO      []YES  Preferred Language for Healthcare:   [x]English       []other:      Pain level:  0-2/10     SUBJECTIVE:  Reports overall 80% improvement with ADL's. Can reach acrossed and behind back easier to put on lotion. Washed windows yesterday without difficulty.       OBJECTIVE:    Observation:    Test measurements: PROM ER 60    OBJECTIVE  Test used Initial score 21   Pain Summary  3/10 No pain just weakness   Functional questionnaire Quick Dash  32% 9%   Functional Testing Shld IR A/P Iliac crest/ 35 L1, WFL    Shld Flex A/P 138/155 155/ 165   ROM Shld Abd  A 98 A 175    Shld ER A/P T1/ P 35 T1, 65-68   Strength Shld flex/abd 4-/5, 3/5 4/5, 4-/5    Shld ER/IR 4/5, 5-/5 4/5, 5-/5      RESTRICTIONS/PRECAUTIONS: none    Exercises/Interventions:     Therapeutic Ex (82714) Sets/sec Reps Notes/CUES   Pulley: flex/ scaption H5 15     Table slide- ER H 30 5    Attempted pec stretching but too painful in superior/ post capsule   IR strap (S)  Post capsule (s)/ Sleeper (s) H30 5  3 ea    Child's pose  +lat bias  H 30 3 ea    Wall slide: 11, 12, 1 15 ea   Cane flex  + hip bridge  [place] chair  Corner (s) 30 5    Supine OVL flex  No money OVL     Push up at wall- 3D  10 ea     SL ABD  SL H ABD 2#  SL ER (2#)   2  3   15 reps  10         Prone shld ext H5 2# 2x10 Prone mid trap/lower trap no wt H5 2x10 repsPrayer (s)  10\" x 10Manual Intervention (85564)      PROM, joint mob- post/ inf glides GH; pec major/ minor (s)   15 mins                                   NMR re-education (10356)   CUES NEEDED   Prone B scap retract + shoulder ext  + MT+ lower trap H 10  H 5 2x15  2x15       TB ext (GTB) 3 15    OVL retract at wall/scapular clocks  LBW  Lift off  2  10 B  3 laps  10 ea     D1/2 flex double arm    TB H ABD Low anchor       TB protract/ retract 3  10 GTB         TB flex/IR walkaways  2x10 reps    Therapeutic Activity (07177)                                              Therapeutic Exercise and NMR EXR  [x] (79305) Provided verbal/tactile cueing for activities related to strengthening, flexibility, endurance, ROM  for improvements in scapular, scapulothoracic and UE control with self care, reaching, carrying, lifting, house/yardwork, driving/computer work.     [x] (64863) Provided verbal/tactile cueing for activities related to improving balance, coordination, kinesthetic sense, posture, motor skill, proprioception  to assist with  scapular, scapulothoracic and UE control with self care, reaching, carrying, lifting, house/yardwork, Slide with Towel - 1 x daily - 7 x weekly - 10 reps  Supine Shoulder Flexion with Dowel - 2-3 x daily - 7 x weekly - 10 reps - 10 hold  Standing Shoulder External Rotation AAROM with Dowel - 2-3 x daily - 7 x weekly - 10 reps - 10 hold  Supine Shoulder External Rotation in 45 Degrees Abduction AAROM with Dowel - 1 x daily - 7 x weekly - 3 sets - 10 reps - 10 hold  Sidelying Shoulder Abduction Palm Forward - 1 x daily - 7 x weekly - 15 reps - 5 hold  Sidelying Shoulder External Rotation with Dumbbell - 1 x daily - 7 x weekly - 15 reps - 5 hold  Prone Scapular Slide with Shoulder Extension - 2-3 x daily - 7 x weekly - 10 reps - 5 hold  Standing Shoulder Internal Rotation Stretch with Towel - 1 x daily - 7 x weekly - 3 sets - 30 hold      GOALS:  Patient stated goal: improve motion, return to swimming  []? Progressing: []? Met: []? Not Met: []? Adjusted     Therapist goals for Patient:   Short Term Goals: To be achieved in: 2 weeks  1. Independent in HEP and progression per patient tolerance, in order to prevent re-injury. []? Progressing: [x]? Met: []? Not Met: []? Adjusted  2. Patient will have a decrease in pain to facilitate improvement in movement, function, and ADLs as indicated by Functional Deficits. [x]? Progressing: [x]? Met: []? Not Met: []? Adjusted     Long Term Goals: To be achieved in: 8 weeks  1. Disability index score of 16% or less for the [facility] to assist with reaching prior level of function. []? Progressing: [x]? Met: []? Not Met: []? Adjusted  2. Patient will demonstrate increased AROM to [facility] to allow for proper joint functioning as indicated by patients Functional Deficits. [x]? Progressing: []? Met: []? Not Met: []? Adjusted  3. Patient will demonstrate an increase in Strength to 4/5 to allow for proper functional mobility as indicated by patients Functional Deficits. [x]? Progressing: []? Met: []? Not Met: []? Adjusted  4.  Patient will return to 75% functional activities without increased symptoms or restriction. []? Progressing: [x]? Met: []? Not Met: []? Adjusted  5. Pt will be able to reach behind her back and behind her head for non adapted hyigene strategies. (patient specific functional goal)    [x]? Progressing: []? Met: []? Not Met: []? Adjusted            Progression Towards Functional goals:  [x] Patient is progressing as expected towards functional goals listed. [] Progression is slowed due to complexities listed. [] Progression has been slowed due to co-morbidities. [] Plan just implemented, too soon to assess goals progression  [] Other:     ASSESSMENT:  Pt continues to progress with regards to strength and ROM- decreased rotation of UE still noted and decreased endurance/strength. Pt requires skilled intervention to restore ROM, strength, functional endurance and balance in order to perform ADLs without significant symptoms or limitations. Overall Progression Towards Functional goals/ Treatment Progress Update:  [x] Patient is progressing as expected towards functional goals listed. [] Progression is slowed due to complexities/Impairments listed. [] Progression has been slowed due to co-morbidities. [] Plan just implemented, too soon to assess goals progression <30days   [] Goals require adjustment due to lack of progress  [] Patient is not progressing as expected and requires additional follow up with physician  [] Other    Prognosis for POC: [x] Good [] Fair  [] Poor      Patient requires continued skilled intervention: [x] Yes  [] No    Treatment/Activity Tolerance:  [x] Patient able to complete treatment  [] Patient limited by fatigue  [] Patient limited by pain    [] Patient limited by other medical complications  [] Other:         PLAN: Continue PT 1-2 x per week. Progress as tolerated.   [x] Continue per plan of care [] Alter current plan (see comments above)  [] Plan of care initiated [] Hold pending MD visit [] Discharge      Electronically signed by:

## 2021-07-02 ENCOUNTER — HOSPITAL ENCOUNTER (OUTPATIENT)
Dept: PHYSICAL THERAPY | Age: 56
Setting detail: THERAPIES SERIES
Discharge: HOME | End: 2021-07-02
Payer: COMMERCIAL

## 2021-07-02 PROCEDURE — 97110 THERAPEUTIC EXERCISES: CPT | Performed by: PHYSICAL THERAPIST

## 2021-07-02 PROCEDURE — 97140 MANUAL THERAPY 1/> REGIONS: CPT | Performed by: PHYSICAL THERAPIST

## 2021-07-02 PROCEDURE — 97112 NEUROMUSCULAR REEDUCATION: CPT | Performed by: PHYSICAL THERAPIST

## 2021-07-02 NOTE — FLOWSHEET NOTE
[facility], [address] [place]  Phone: [phone]  Fax [phone]        Date:  2021    Patient Name:  Denver Meza    :  1965  MRN: [id number]  Restrictions/Precautions:    Medical/Treatment Diagnosis Information:  · Diagnosis: Left shoulder pain M25.512  · Treatment Diagnosis: L shoulder pain and stiffness  Insurance/Certification information:  PT Insurance Information: Medical [place]- no auth, no co pay, 40 visits, no telehealth  Physician Information:  Referring Practitioner: Preston Arm  Has the plan of care been signed (Y/N):        []? Yes                    [x]? No       Date of Patient follow up with Physician: 2 weeks for injection      Is this a Progress Report:     []  Yes  [x]  No        If Yes:  Date Range for reporting period:  Beginning 21  Ending 21    Progress report will be due (10 Rx or 30 days whichever is less): 72      Recertification will be due (POC Duration  / 90 days whichever is less): 8 weeks from IE ()     Visit # Insurance Allowable Auth Required   In-person 10 40 []  Yes [x]  No    Telehealth X X []  Yes []  No    Total            Functional Scale: 9% Q DASH    Date assessed: 21     Therapy Diagnosis/Practice Pattern: L shoulder pain/ stiffness/ C      Number of Comorbidities:  []0     [x]1-2    []3+    Latex Allergy:  [x]NO      []YES  Preferred Language for Healthcare:   [x]English       []other:      Pain level:  0-2/10     SUBJECTIVE:  Pt reports soreness with rotational motions. She is experiencing aching with use of the arm, but denies pain.       OBJECTIVE:    Observation:    Test measurements: PROM ER 60    OBJECTIVE  Test used Initial score 21   Pain Summary  3/10 No pain just weakness   Functional questionnaire Quick Dash  32% 9%   Functional Testing Shld IR A/P Iliac crest/ 35 L1, WFL    Shld Flex A/P 138/155 155/ 165   ROM Shld Abd A 98 A 175    Shld ER A/P T1/ P 35 T1, 65-68   Strength Shld flex/abd 4-/5, 3/5 4/5, 4-/5    Shld ER/IR 4/5, 5-/5 4/5, 5-/5      RESTRICTIONS/PRECAUTIONS: none    Exercises/Interventions:     Therapeutic Ex (00199) Sets/sec Reps Notes/CUES   Pulley: flex/ scaption H5 15     Table slide- ER H 30 5    Attempted pec stretching but too painful in superior/ post capsule   IR strap (S)  Post capsule (s)/ Sleeper (s) H30 5  3 ea    Child's pose  +lat bias  H 30 3 ea    Wall slide: 11, 12, 1 15 ea   Cane flex  + hip bridge  Beach chair  Corner (s) 30 5    Supine OVL flex  No money OVL     Push up at wall  10 ea  On SB   SL ABD  SL H ABD 2#  SL ER (2#)   2  3   15 reps  10         Prone shld ext H5 2# 2x10 Prone mid trap/lower trap no wt H5 2x10 repsPrayer (s)  10\" x 10Manual Intervention (12084)      PROM, joint mob- post/ inf glides GH; pec major/ minor (s)   15 mins     R.S. in 90 flex 15 3                            NMR re-education (89977)   CUES NEEDED   Prone B scap retract + shoulder ext  + MT+ lower trap 2 10  On SB      TB ext (GTB) 3 15    OVL retract at wall/scapular clocks  LBW  Lift off  2  10 B  3 laps  10 ea     D1/2 flex double arm YTB 2 x 12 ea    TB H ABD Low anchor   D1 ext    TB protract/ retract 3  10 GTB   Quadruped UE lift- flex/ abd  15 B    TB flex/IR walkaways  2x10 reps    Therapeutic Activity (49249)                                              Therapeutic Exercise and NMR EXR  [x] (59020) Provided verbal/tactile cueing for activities related to strengthening, flexibility, endurance, ROM  for improvements in scapular, scapulothoracic and UE control with self care, reaching, carrying, lifting, house/yardwork, driving/computer work.     [x] (41488) Provided verbal/tactile cueing for activities related to improving balance, coordination, kinesthetic sense, posture, motor skill, proprioception  to assist with  scapular, scapulothoracic and UE control with self care, reaching, carrying, lifting, house/yardwork, driving/computer work. Therapeutic Activities:    [x] (55255 or 68808) Provided verbal/tactile cueing for activities related to improving balance, coordination, kinesthetic sense, posture, motor skill, proprioception and motor activation to allow for proper function of scapular, scapulothoracic and UE control with self care, carrying, lifting, driving/computer work. Home Exercise Program:    [x] (46442) Reviewed/Progressed HEP activities related to strengthening, flexibility, endurance, ROM of scapular, scapulothoracic and UE control with self care, reaching, carrying, lifting, house/yardwork, driving/computer work  [x] (77961) Reviewed/Progressed HEP activities related to improving balance, coordination, kinesthetic sense, posture, motor skill, proprioception of scapular, scapulothoracic and UE control with self care, reaching, carrying, lifting, house/yardwork, driving/computer work      Manual Treatments:  PROM / STM / Oscillations-Mobs:  G-I, II, III, IV (PA's, Inf., Post.)  [x] (26236) Provided manual therapy to mobilize soft tissue/joints of cervical/CT, scapular GHJ and UE for the purpose of modulating pain, promoting relaxation,  increasing ROM, reducing/eliminating soft tissue swelling/inflammation/restriction, improving soft tissue extensibility and allowing for proper ROM for normal function with self care, reaching, carrying, lifting, house/yardwork, driving/computer work    Modalities:  Deferred modalities. Charges  Timed Code Treatment Minutes: 48'   Total Treatment Minutes: 48'       [] EVAL (LOW) 455 1011   [] EVAL (MOD) 18070   [] EVAL (HIGH) 12888   [] RE-EVAL     [x] XT(81301) x 1  [] IONTO  [x] NMR (28526) x  1   [] VASO  [x] Manual (94220) x 1     [] Other:  [] TA x      [] Mech Traction (40100)  [] ES(attended) (36501)      [] ES (un) (35036): Access Code: [email or web address]  URL: [email or web address]  Date: 06/04/2021  Prepared by: Alex Lemos return to 75% functional activities without increased symptoms or restriction. []? Progressing: [x]? Met: []? Not Met: []? Adjusted  5. Pt will be able to reach behind her back and behind her head for non adapted hyigene strategies. (patient specific functional goal)    [x]? Progressing: []? Met: []? Not Met: []? Adjusted            Progression Towards Functional goals:  [x] Patient is progressing as expected towards functional goals listed. [] Progression is slowed due to complexities listed. [] Progression has been slowed due to co-morbidities. [] Plan just implemented, too soon to assess goals progression  [] Other:     ASSESSMENT:  Pt continues to progress with regards to strength and ROM- decreased rotation of UE still noted and decreased endurance/strength. Pt requires skilled intervention to restore ROM, strength, functional endurance and balance in order to perform ADLs without significant symptoms or limitations. Overall Progression Towards Functional goals/ Treatment Progress Update:  [x] Patient is progressing as expected towards functional goals listed. [] Progression is slowed due to complexities/Impairments listed. [] Progression has been slowed due to co-morbidities. [] Plan just implemented, too soon to assess goals progression <30days   [] Goals require adjustment due to lack of progress  [] Patient is not progressing as expected and requires additional follow up with physician  [] Other    Prognosis for POC: [x] Good [] Fair  [] Poor      Patient requires continued skilled intervention: [x] Yes  [] No    Treatment/Activity Tolerance:  [x] Patient able to complete treatment  [] Patient limited by fatigue  [] Patient limited by pain    [] Patient limited by other medical complications  [] Other:         PLAN: Continue PT 1-2 x per week. Progress as tolerated.   [x] Continue per plan of care [] Alter current plan (see comments above)  [] Plan of care initiated [] Hold pending MD

## 2021-07-06 ENCOUNTER — APPOINTMENT (OUTPATIENT)
Dept: PHYSICAL THERAPY | Age: 56
End: 2021-07-06
Payer: COMMERCIAL

## 2021-07-09 ENCOUNTER — HOSPITAL ENCOUNTER (OUTPATIENT)
Dept: PHYSICAL THERAPY | Age: 56
Setting detail: THERAPIES SERIES
Discharge: HOME | End: 2021-07-09
Payer: COMMERCIAL

## 2021-07-09 PROCEDURE — 97140 MANUAL THERAPY 1/> REGIONS: CPT | Performed by: PHYSICAL THERAPIST

## 2021-07-09 PROCEDURE — 97110 THERAPEUTIC EXERCISES: CPT | Performed by: PHYSICAL THERAPIST

## 2021-07-09 PROCEDURE — 97112 NEUROMUSCULAR REEDUCATION: CPT | Performed by: PHYSICAL THERAPIST

## 2021-07-09 NOTE — FLOWSHEET NOTE
[facility], [address] [place]  Phone: [phone]  Fax [phone]        Date:  2021    Patient Name:  Denver Meza    :  1965  MRN: [id number]  Restrictions/Precautions:    Medical/Treatment Diagnosis Information:  · Diagnosis: Left shoulder pain M25.512  · Treatment Diagnosis: L shoulder pain and stiffness  Insurance/Certification information:  PT Insurance Information: Medical [place]- no auth, no co pay, 40 visits, no telehealth  Physician Information:  Referring Practitioner: Preston Arm  Has the plan of care been signed (Y/N):        []? Yes                    [x]? No       Date of Patient follow up with Physician: 2 weeks for injection      Is this a Progress Report:     []  Yes  [x]  No        If Yes:  Date Range for reporting period:  Beginning 21  Ending 21    Progress report will be due (10 Rx or 30 days whichever is less): 83      Recertification will be due (POC Duration  / 90 days whichever is less): 8 weeks from IE (-)     Visit # Insurance Allowable Auth Required   In-person 13 40 []  Yes [x]  No    Telehealth X X []  Yes []  No    Total            Functional Scale: 9% Q DASH    Date assessed: 21     Therapy Diagnosis/Practice Pattern: L shoulder pain/ stiffness/ C      Number of Comorbidities:  []0     [x]1-2    []3+    Latex Allergy:  [x]NO      []YES  Preferred Language for Healthcare:   [x]English       []other:      Pain level:  0-2/10     SUBJECTIVE:  Pt states she is almost done with steroid pack. She has had some soreness after a massage, but she says her pain is much better.      OBJECTIVE:    Observation:    Test measurements: PROM ER 60    OBJECTIVE  Test used Initial score 21   Pain Summary  3/10 No pain just weakness   Functional questionnaire Quick Dash  32% 9%   Functional Testing Shld IR A/P Iliac crest/ 35 L1, WFL    Shld Flex A/P 138/155 155/ 165   ROM Shld Abd  A 98 A 175    Shld ER A/P T1/ P 35 T1, 65-68   Strength Shld flex/abd 4-/5, 3/5 4/5, 4-/5    Shld ER/IR 4/5, 5-/5 4/5, 5-/5      RESTRICTIONS/PRECAUTIONS: none    Exercises/Interventions:     Therapeutic Ex (17238) Sets/sec Reps Notes/CUES   [place]  4 mins     Table slide- ER H 30 5    Attempted pec stretching but too painful in superior/ post capsule   IR strap (S)  Post capsule (s)/ Sleeper (s) H30 5  3 ea    Child's pose  +lat bias  H 30 3 ea    Wall slide: 11, 12, 1 12 eaYellow MB   Cane flex  + hip bridge  Beach chair  Corner (s) 30 5    Supine OVL flex  No money OVL     Push up at wall  10 ea  On SB   SL ABD  SL H ABD 2#  SL ER (2#)   2  3   15 reps  10       Wall ball- 4 direction 2 10 eaYellow MBProne shld ext H5 2# 2x10 Prone mid trap/lower trap no wt H5 2x10 repsPrayer (s)  10\" x 10Manual Intervention (59189)      PROM, joint mob- post/ inf glides GH; pec major/ minor (s)   15 mins     R.S. in 90 flex 15 3                            NMR re-education (66845)   CUES NEEDED   Prone B scap retract + shoulder ext  + MT+ lower trap 2 10  On SB      TB ext (BTB)  Tricep press (BTB) 3 10 ea    OVL retract at wall/scapular clocks  LBW  Lift off  2  8 B  3 laps  10 ea     D1/2 flex double arm YTB 2 x 12 ea    Serratus slide OVL 3 10    D1 ext    TB ER 3  10 RTB   Quadruped UE lift- flex/ abd  Quadruped threading  15 B    TB flex/IR walkaways  2x10 reps    Therapeutic Activity (80637)                                              Therapeutic Exercise and NMR EXR  [x] (58742) Provided verbal/tactile cueing for activities related to strengthening, flexibility, endurance, ROM  for improvements in scapular, scapulothoracic and UE control with self care, reaching, carrying, lifting, house/yardwork, driving/computer work.     [x] (66372) Provided verbal/tactile cueing for activities related to improving balance, coordination, kinesthetic sense, posture, motor skill, proprioception  to assist with Met: []? Adjusted  3. Patient will demonstrate an increase in Strength to 4/5 to allow for proper functional mobility as indicated by patients Functional Deficits. [x]? Progressing: []? Met: []? Not Met: []? Adjusted  4. Patient will return to 75% functional activities without increased symptoms or restriction. []? Progressing: [x]? Met: []? Not Met: []? Adjusted  5. Pt will be able to reach behind her back and behind her head for non adapted hyigene strategies. (patient specific functional goal)    [x]? Progressing: []? Met: []? Not Met: []? Adjusted            Progression Towards Functional goals:  [x] Patient is progressing as expected towards functional goals listed. [] Progression is slowed due to complexities listed. [] Progression has been slowed due to co-morbidities. [] Plan just implemented, too soon to assess goals progression  [] Other:     ASSESSMENT:  Updated HEP. Pt has drastically functionally improved. Pt requires skilled intervention to restore ROM, strength, functional endurance and balance in order to perform ADLs without significant symptoms or limitations. Overall Progression Towards Functional goals/ Treatment Progress Update:  [x] Patient is progressing as expected towards functional goals listed. [] Progression is slowed due to complexities/Impairments listed. [] Progression has been slowed due to co-morbidities.   [] Plan just implemented, too soon to assess goals progression <30days   [] Goals require adjustment due to lack of progress  [] Patient is not progressing as expected and requires additional follow up with physician  [] Other    Prognosis for POC: [x] Good [] Fair  [] Poor      Patient requires continued skilled intervention: [x] Yes  [] No    Treatment/Activity Tolerance:  [x] Patient able to complete treatment  [] Patient limited by fatigue  [] Patient limited by pain    [] Patient limited by other medical complications  [] Other:         PLAN: Pt close to d/c- likely [place]. [x] Continue per plan of care [] Alter current plan (see comments above)  [] Plan of care initiated [] Hold pending MD visit [] Discharge      Electronically signed by:  Kandi Marshall, [place] [id number]    Note: If patient does not return for scheduled/ recommended follow up visits, this note will serve as a discharge from care along with most recent update on progress.

## 2021-07-16 ENCOUNTER — APPOINTMENT (OUTPATIENT)
Dept: PHYSICAL THERAPY | Age: 56
End: 2021-07-16
Payer: COMMERCIAL

## 2021-07-23 ENCOUNTER — HOSPITAL ENCOUNTER (OUTPATIENT)
Dept: PHYSICAL THERAPY | Age: 56
Setting detail: THERAPIES SERIES
End: 2021-07-23
Payer: COMMERCIAL

## 2021-07-26 ENCOUNTER — HOSPITAL ENCOUNTER (OUTPATIENT)
Dept: PHYSICAL THERAPY | Age: 56
Setting detail: THERAPIES SERIES
Discharge: HOME | End: 2021-07-26
Payer: COMMERCIAL

## 2021-07-26 PROCEDURE — 97110 THERAPEUTIC EXERCISES: CPT | Performed by: PHYSICAL THERAPIST

## 2021-08-04 ENCOUNTER — OFFICE VISIT (OUTPATIENT)
Dept: PRIMARY CARE CLINIC | Age: 56
End: 2021-08-04
Payer: COMMERCIAL

## 2021-08-04 VITALS — HEART RATE: 95 BPM | OXYGEN SATURATION: 98 %

## 2021-08-04 DIAGNOSIS — R69 ILLNESS: ICD-10-CM

## 2021-08-04 DIAGNOSIS — R09.89 CHEST CONGESTION: Primary | ICD-10-CM

## 2021-08-04 PROCEDURE — 99202 OFFICE O/P NEW SF 15 MIN: CPT | Performed by: PHYSICIAN ASSISTANT

## 2021-08-04 ASSESSMENT — ENCOUNTER SYMPTOMS
COUGH: 0
ABDOMINAL PAIN: 0
CHEST TIGHTNESS: 0
SHORTNESS OF BREATH: 0
NAUSEA: 0
DIARRHEA: 0
VOMITING: 0
COLOR CHANGE: 0

## 2021-08-04 NOTE — PATIENT INSTRUCTIONS
Patient Education        Learning About Coronavirus [phone])  What is coronavirus (COVID-19)? COVID-19 is a disease caused by a new type of coronavirus. This illness was first found in December 2019. It has since spread worldwide. Coronaviruses are a large group of viruses. They cause the common cold. They also cause more serious illnesses like Middle East respiratory syndrome (MERS) and severe acute respiratory syndrome (SARS). COVID-19 is caused by a novel coronavirus. That means it's a new type that has not been seen in people before. What are the symptoms? Coronavirus (COVID-19) symptoms may include:  · Fever. · Cough. · Trouble breathing. · Chills or repeated shaking with chills. · Muscle pain. · Headache. · Sore throat. · New loss of taste or smell. · Vomiting. · Diarrhea. In severe cases, COVID-19 can cause pneumonia and make it hard to breathe without help from a machine. It can cause death. How is it diagnosed? COVID-19 is diagnosed with a viral test. This may also be called a PCR test or antigen test. It looks for evidence of the virus in your breathing passages or lungs (respiratory system). The test is most often done on a sample from the nose, throat, or lungs. It's sometimes done on a sample of saliva. One way a sample is collected is by putting a long swab into the back of your nose. How is it treated? Mild cases of COVID-19 can be treated at home. Serious cases need treatment in the hospital. Treatment may include medicines to reduce symptoms, plus breathing support such as oxygen therapy or a ventilator. Some people may be placed on their belly to help their oxygen levels. Treatments that may help people who have COVID-19 include:  Antiviral medicines. These medicines treat viral infections. Remdesivir is an example. Immune-based therapy. These medicines help the immune system fight COVID-19. One example is bamlanivimab. It's a monoclonal antibody. Blood thinners. These medicines help prevent blood clots. People with severe illness are at risk for blood clots. How can you protect yourself and others? The best way to protect yourself from getting sick is to:  · Avoid areas where there is an outbreak. · Avoid contact with people who may be infected. · Avoid crowds and try to stay at least 6 feet away from other people. · Wash your hands often, especially after you cough or sneeze. Use soap and water, and scrub for at least 20 seconds. If soap and water aren't available, use an alcohol-based hand . · Avoid touching your mouth, nose, and eyes. To help avoid spreading the virus to others:  · Stay home if you are sick or have been exposed to the virus. Don't go to school, work, or public areas. And don't use public transportation, ride-shares, or taxis unless you have no choice. · Wear a cloth face cover if you have to go to public areas. · Cover your mouth with a tissue when you cough or sneeze. Then throw the tissue in the trash and wash your hands right away. · If you're sick:  ? Leave your home only if you need to get medical care. But call the doctor's office first so they know you're coming. And wear a face cover. ? Wear the face cover whenever you're around other people. It can help stop the spread of the virus. ? Limit contact with pets and people in your home. If possible, stay in a separate bedroom and use a separate bathroom. ? Clean and disinfect your home every day. Use household  and disinfectant wipes or sprays. Take special care to clean things that you grab with your hands. These include doorknobs, remote controls, phones, and handles on your refrigerator and microwave. And don't forget countertops, tabletops, bathrooms, and computer keyboards. When should you call for help? Call 911 anytime you think you may need emergency care.  For example, call if you have life-threatening symptoms, such as:    · You have severe trouble breathing. (You can't talk at all.)     · You have constant chest pain or pressure.     · You are severely dizzy or lightheaded.     · You are confused or can't think clearly.     · Your face and lips have a blue color.     · You pass out (lose consciousness) or are very hard to wake up. Call your doctor now or seek immediate medical care if:    · You have moderate trouble breathing. (You can't speak a full sentence.)     · You are coughing up blood (more than about 1 teaspoon).     · You have signs of low blood pressure. These include feeling lightheaded; being too weak to stand; and having cold, pale, clammy skin. Watch closely for changes in your health, and be sure to contact your doctor if:    · Your symptoms get worse.     · You are not getting better as expected. Call before you go to the doctor's office. Follow their instructions. And wear a cloth face cover. Current as of: March 26, 2021               Content Version: 12.9  © 2006-2021 [employer]. Care instructions adapted under license by [facility] ([facility]). If you have questions about a medical condition or this instruction, always ask your healthcare professional. [place] [address] any warranty or liability for your use of this information.

## 2021-08-04 NOTE — PROGRESS NOTES
2021    Denver Meza (:  1965) is a 50 y.o. female, here for evaluation of the following medical concerns:     Chief Complaint   Patient presents with    Chest Congestion    Covid Testing        HPI 3 days of mild irritating, chest congestion, along with feeling very tired. Patient denies any chest pain or shortness of breath, patient denies any fevers. Patient received Covid vaccine Moderna in 2021. Patient has had no known Covid exposures however indicates that she has been to the [place] and to [facility] numerous times over the past several weeks. Patient advised that she is concerned about a breakthrough infection and would like to be tested for Covid. Review of Systems   Constitutional: Positive for fatigue. Negative for chills and fever. HENT: Negative. Respiratory: Negative for cough, chest tightness and shortness of breath. Cardiovascular: Negative for chest pain. Gastrointestinal: Negative for abdominal pain, diarrhea, nausea and vomiting. Genitourinary: Negative. Musculoskeletal: Negative. Skin: Negative for color change and rash. Neurological: Negative. Hematological: Negative. Psychiatric/Behavioral: Negative. All other systems reviewed and are negative. Prior to Visit Medications    Medication Sig Taking? Authorizing Provider   methylPREDNISolone (MEDROL, ERI,) 4 MG tablet TAKE ONE PACK, ONE WEEK OFF, TAKE SECOND PACK  Cipriano Junior MD   Multiple Vitamins-Minerals (THERAPEUTIC MULTIVITAMIN-MINERALS) tablet Take 1 tablet by mouth daily  Historical Provider, MD        Allergies   Allergen Reactions    Morphine Other (See Comments)     ARM FELT WARM THEN CHEST [address] DIDN'T LAST LONG.     Tetracyclines & Related      YEAST INFECTION       Past Medical History:   Diagnosis Date    Allergic rhinitis     GERD (gastroesophageal reflux disease)     Hearing loss     Nosebleed     Prolonged emergence from general anesthesia FIRST SURGERY AWOKE VERY EMOTIONAL    Rash     TMJ dysfunction        Past Surgical History:   Procedure Laterality Date    APPENDECTOMY      CHOLECYSTECTOMY, LAPAROSCOPIC N/A 3/18/2021    LAPAROSCOPIC CHOLECYSTECTOMY WITH INTRAOPERATIVE CHOLANGIOGRAM, POSSIBLE OPEN PROCEDURE performed by Cliff Pruitt MD at [facility] COLONOSCOPY N/A 2019    COLONOSCOPY CONTROL HEMORRHAGE performed by Miranda Baltazar MD at [facility]  2019    polypectomy per hot snare performed by Miranda Baltazar MD at [address] [place] [address] Road History     Socioeconomic History    Marital status:      Spouse name: Not on file    Number of children: 0    Years of education: Not on file    Highest education level: Not on file   Occupational History    Not on file   Tobacco Use    Smoking status: Former Smoker     Years: 20.00     Quit date: 2002     Years since quittin.5    Smokeless tobacco: Never Used   Vaping Use    Vaping Use: Never used   Substance and Sexual Activity    Alcohol use: Not Currently     Alcohol/week: 0.0 standard drinks     Comment: occasionally     Drug use: Never    Sexual activity: Not Currently   Other Topics Concern    Not on file   Social History Narrative    Not on file     Social Determinants of Health     Financial Resource Strain:     Difficulty of Paying Living Expenses:    Food Insecurity:     Worried About Running Out of Food in the Last Year:     Ran Out of Food in the Last Year:    Transportation Needs:     Lack of Transportation (Medical):      Lack of Transportation (Non-Medical):    Physical Activity:     Days of Exercise per Week:     Minutes of Exercise per Session:    Stress:     Feeling of Stress :    Social Connections:     Frequency of Communication with Friends and Family:     Frequency of Social Gatherings with Friends and Family:     Attends [employer] Services:     Active Member of Clubs or Organizations:     Attends Club or Organization Meetings:     Marital Status:    Intimate Partner Violence:     Fear of Current or Ex-Partner:     Emotionally Abused:     Physically Abused:     Sexually Abused:         Family History   Problem Relation Age of Onset    Diabetes Maternal Grandmother     Diabetes Other         great aunts and uncles    Esophageal Cancer Maternal Grandfather     Lupus Other         maternal great aunt    COPD Mother     Alcohol Abuse Mother         recovering    Other Mother         Mac Lung dz    Heart Disease Father        Vitals:    08/04/21 1110   Pulse: 95   SpO2: 98%     Estimated body mass index is 28.35 kg/m² as calculated from the following:    Height as of 6/10/21: 5' 7\" (1.702 m). Weight as of 6/10/21: 181 lb (82.1 kg). Physical Exam  Vitals and nursing note reviewed. Constitutional:       General: She is not in acute distress. Appearance: Normal appearance. She is well-developed and normal weight. She is not ill-appearing, toxic-appearing or diaphoretic. HENT:      Head: Normocephalic and atraumatic. Nose: Nose normal.      Mouth/Throat:      Pharynx: No oropharyngeal exudate. Eyes:      General:         Right eye: No discharge. Left eye: No discharge. Conjunctiva/sclera: Conjunctivae normal.      Pupils: Pupils are equal, round, and reactive to light. Cardiovascular:      Rate and Rhythm: Normal rate and regular rhythm. Heart sounds: Normal heart sounds. No murmur heard. No friction rub. No gallop. Pulmonary:      Effort: Pulmonary effort is normal. No respiratory distress. Breath sounds: Normal breath sounds. No wheezing. Abdominal:      General: There is no distension. Tenderness: There is no abdominal tenderness. Musculoskeletal:         General: Normal range of motion. Cervical back: Normal range of motion. Skin:     General: Skin is warm and dry.       Capillary Refill: Capillary refill takes less than 2 seconds. Neurological:      General: No focal deficit present. Mental Status: She is alert and oriented to person, place, and time. Psychiatric:         Mood and Affect: Mood normal.         Behavior: Behavior normal.         ASSESSMENT/PLAN: Nontoxic patient, in no acute distress. Patient is not hypoxic with an oxygen saturation of 98%. Patient evaluated in the parking lot of providers office, while wearing appropriate PPE. Covid pending at time of discharge. Discussed indications to got to ED. Follow-up with Dr. Ric Bagley as needed. 1. Chest congestion  - COVID-19    2. Illness  - COVID-19      Return if symptoms worsen or fail to improve. An  electronic signature was used to authenticate this note.          --Allison Brown PA-C on 8/4/2021 at 11:45 AM

## 2021-08-05 LAB — SARS-COV-2: NOT DETECTED

## 2022-01-26 ENCOUNTER — TELEPHONE (OUTPATIENT)
Dept: WOMENS IMAGING | Age: 57
End: 2022-01-26

## 2022-02-10 ENCOUNTER — HOSPITAL ENCOUNTER (OUTPATIENT)
Dept: WOMENS IMAGING | Age: 57
Discharge: HOME | End: 2022-02-10
Payer: COMMERCIAL

## 2022-02-10 DIAGNOSIS — Z12.31 ENCOUNTER FOR SCREENING MAMMOGRAM FOR MALIGNANT NEOPLASM OF BREAST: ICD-10-CM

## 2022-02-10 PROCEDURE — 77067 SCR MAMMO BI INCL CAD: CPT

## 2022-03-19 NOTE — PROGRESS NOTES
polypectomy per hot snare performed by Abdon Tolbert MD at [address] [place] [address] Drive EXTRACTION       Family History   Problem Relation Age of Onset    Diabetes Maternal Grandmother     Diabetes Other         great aunts and uncles    Esophageal Cancer Maternal Grandfather     Lupus Other         maternal great aunt    COPD Mother     Alcohol Abuse Mother         recovering    Other Mother         Mac Lung dz    Heart Disease Father      Social History     Socioeconomic History    Marital status:      Spouse name: Not on file    Number of children: 0    Years of education: Not on file    Highest education level: Not on file   Occupational History    Not on file   Social Needs    Financial resource strain: Not very hard    Food insecurity     Worry: Never true     Inability: Never true    Transportation needs     Medical: No     Non-medical: No   Tobacco Use    Smoking status: Former Smoker     Years: 20.00     Quit date: 2002     Years since quittin.2    Smokeless tobacco: Never Used   Substance and Sexual Activity    Alcohol use: Not Currently     Alcohol/week: 0.0 standard drinks     Comment: occasionally     Drug use: Never    Sexual activity: Not Currently   Lifestyle    Physical activity     Days per week: Not on file     Minutes per session: Not on file    Stress: Not on file   Relationships    Social connections     Talks on phone: Not on file     Gets together: Not on file     Attends Druze service: Not on file     Active member of club or organization: Not on file     Attends meetings of clubs or organizations: Not on file     Relationship status: Not on file    Intimate partner violence     Fear of current or ex partner: Not on file     Emotionally abused: Not on file     Physically abused: Not on file     Forced sexual activity: Not on file   Other Topics Concern    Not on file   Social History Narrative    Not on file       DRUG/FOOD ALLERGIES: Morphine and Tetracyclines & related    CURRENT MEDICATIONS  Prior to Admission medications    Medication Sig Start Date End Date Taking? Authorizing Provider   Multiple Vitamins-Minerals (THERAPEUTIC MULTIVITAMIN-MINERALS) tablet Take 1 tablet by mouth daily    Historical Provider, MD       REVIEW OF SYSTEMS  The following systems were reviewed and revealed the following in addition to any already discussed in the HPI:    Review of Systems   Constitutional: Negative for fatigue and fever. HENT: Positive for congestion and rhinorrhea. Negative for ear pain, postnasal drip and sneezing. Eyes: Negative for pain and visual disturbance. Respiratory: Negative for cough and shortness of breath. Cardiovascular: Negative for chest pain. Gastrointestinal: Negative for nausea and vomiting. Endocrine: Negative. Genitourinary: Negative. Musculoskeletal: Negative for neck pain and neck stiffness. Skin: Negative for rash. Neurological: Negative for dizziness and headaches.           PHYSICAL EXAM  /83 (Site: Right Upper Arm, Position: Sitting)   Pulse 100   Ht 5' 7\" (1.702 m)   Wt 170 lb (77.1 kg)   BMI 26.63 kg/m²     GENERAL: No Acute Distress, Alert and Oriented, no hoarseness  EYES: EOMI, Anti-icteric  NOSE: No epistaxis, nasal mucosa within normal limits, no purulent drainage, septum deviated to the left, inferior turbinate hypertrophy bilaterally, nasal valve collapse bilaterally  EARS: Normal external canal appearance, EAC patent bilaterally, TMs intact bilateral with no evidence effusions  FACE: 1/6 House-Brackmann Scale, symmetric, sensation equal bilaterally  ORAL CAVITY: No masses or lesions palpated, uvula is midline, moist mucous membranes,   NECK: Normal range of motion, no thyromegaly, trachea is midline, no lymphadenopathy, no neck masses, no crepitus  CHEST: Normal respiratory effort, no retractions, breathing comfortably  SKIN: No rashes, normal appearing skin, no evidence of skin lesions/tumors    RADIOLOGY  Summary of findings:  CT sinus without contrast from February 2021  4 mm rightward nasal septal deviation causing mild right sided nasal   passageway narrowing.  No structural anomaly otherwise.  Minor chronic   mucosal thickening of the inferior maxillary sinus on both sides.  Paranasal   sinuses otherwise clear. PROCEDURE      ASSESSMENT/PLAN  Isra Menendez is a very pleasant 50 y.o. female with     1. Allergic rhinitis, unspecified seasonality, unspecified trigger  - NC PERCUTANEOUS TESTS W/ALLERGENIC EXTRACTS  - NC ALLERGY SKIN TESTS  If in her significant allergies I will refer her for allergy testing and potential shots with immunotherapy. She will continue her allergy regimen. 2. Deviated nasal septum  3. Nasal valve collapse  4. Hypertrophy of inferior nasal turbinate    She is a candidate for correction of her nasal septum and reduction of her inferior turbinates. At the same time we would address her nasal valve collapse. We will try to control her allergies more effectively prior to moving forward with surgery. She will follow-up with me in 2 to 3 months. Medical Decision Making:   The following items were considered in medical decision making:  Independent review of images  Review / order clinical lab tests  Review / order radiology tests  Decision to obtain old records knowledge deficit/latch on difficulty

## 2022-03-28 ENCOUNTER — HOSPITAL ENCOUNTER (OUTPATIENT)
Dept: GENERAL RADIOLOGY | Age: 57
Discharge: HOME | End: 2022-03-28
Payer: COMMERCIAL

## 2022-03-28 ENCOUNTER — OFFICE VISIT (OUTPATIENT)
Dept: FAMILY MEDICINE CLINIC | Age: 57
End: 2022-03-28
Payer: COMMERCIAL

## 2022-03-28 ENCOUNTER — HOSPITAL ENCOUNTER (OUTPATIENT)
Age: 57
Discharge: HOME | End: 2022-03-28
Payer: COMMERCIAL

## 2022-03-28 VITALS
BODY MASS INDEX: 28.13 KG/M2 | OXYGEN SATURATION: 97 % | WEIGHT: 179.6 LBS | SYSTOLIC BLOOD PRESSURE: 122 MMHG | DIASTOLIC BLOOD PRESSURE: 80 MMHG | HEART RATE: 102 BPM

## 2022-03-28 DIAGNOSIS — R10.13 EPIGASTRIC PAIN: ICD-10-CM

## 2022-03-28 DIAGNOSIS — R73.03 PREDIABETES: ICD-10-CM

## 2022-03-28 DIAGNOSIS — R10.12 LUQ PAIN: ICD-10-CM

## 2022-03-28 DIAGNOSIS — R04.0 EPISTAXIS: ICD-10-CM

## 2022-03-28 DIAGNOSIS — R07.82 INTERCOSTAL PAIN: ICD-10-CM

## 2022-03-28 DIAGNOSIS — R10.12 LUQ PAIN: Primary | ICD-10-CM

## 2022-03-28 LAB
A/G RATIO: 2.2 (ref 1.1–2.2)
ALBUMIN SERPL-MCNC: 4.6 G/DL (ref 3.4–5)
ALP BLD-CCNC: 63 U/L (ref 40–129)
ALT SERPL-CCNC: 20 U/L (ref 10–40)
ANION GAP SERPL CALCULATED.3IONS-SCNC: 14 MMOL/L (ref 3–16)
APTT: 35 SEC (ref 26.2–38.6)
AST SERPL-CCNC: 15 U/L (ref 15–37)
BASOPHILS ABSOLUTE: 0.1 K/UL (ref 0–0.2)
BASOPHILS RELATIVE PERCENT: 0.8 %
BILIRUB SERPL-MCNC: <0.2 MG/DL (ref 0–1)
BUN BLDV-MCNC: 8 MG/DL (ref 7–20)
CALCIUM SERPL-MCNC: 9.1 MG/DL (ref 8.3–10.6)
CHLORIDE BLD-SCNC: 104 MMOL/L (ref 99–110)
CO2: 21 MMOL/L (ref 21–32)
CREAT SERPL-MCNC: 0.7 MG/DL (ref 0.6–1.1)
EOSINOPHILS ABSOLUTE: 0.2 K/UL (ref 0–0.6)
EOSINOPHILS RELATIVE PERCENT: 2.2 %
GFR AFRICAN AMERICAN: >60
GFR NON-AFRICAN AMERICAN: >60
GLUCOSE BLD-MCNC: 93 MG/DL (ref 70–99)
HCT VFR BLD CALC: 42.5 % (ref 36–48)
HEMOGLOBIN: 14.4 G/DL (ref 12–16)
INR BLD: 0.94 (ref 0.88–1.12)
LIPASE: 48 U/L (ref 13–60)
LYMPHOCYTES ABSOLUTE: 1.5 K/UL (ref 1–5.1)
LYMPHOCYTES RELATIVE PERCENT: 19.2 %
MCH RBC QN AUTO: 30.7 PG (ref 26–34)
MCHC RBC AUTO-ENTMCNC: 33.8 G/DL (ref 31–36)
MCV RBC AUTO: 90.7 FL (ref 80–100)
MONOCYTES ABSOLUTE: 0.5 K/UL (ref 0–1.3)
MONOCYTES RELATIVE PERCENT: 6.3 %
NEUTROPHILS ABSOLUTE: 5.5 K/UL (ref 1.7–7.7)
NEUTROPHILS RELATIVE PERCENT: 71.5 %
PDW BLD-RTO: 13.3 % (ref 12.4–15.4)
PLATELET # BLD: 327 K/UL (ref 135–450)
PMV BLD AUTO: 7.4 FL (ref 5–10.5)
POTASSIUM SERPL-SCNC: 4.4 MMOL/L (ref 3.5–5.1)
PROTHROMBIN TIME: 10.6 SEC (ref 9.9–12.7)
RBC # BLD: 4.69 M/UL (ref 4–5.2)
SODIUM BLD-SCNC: 139 MMOL/L (ref 136–145)
TOTAL PROTEIN: 6.7 G/DL (ref 6.4–8.2)
TSH REFLEX: 2.56 UIU/ML (ref 0.27–4.2)
WBC # BLD: 7.7 K/UL (ref 4–11)

## 2022-03-28 PROCEDURE — 99214 OFFICE O/P EST MOD 30 MIN: CPT | Performed by: FAMILY MEDICINE

## 2022-03-28 PROCEDURE — 71101 X-RAY EXAM UNILAT RIBS/CHEST: CPT

## 2022-03-28 ASSESSMENT — ENCOUNTER SYMPTOMS
SHORTNESS OF BREATH: 0
NAUSEA: 0
ABDOMINAL DISTENTION: 0
ABDOMINAL PAIN: 1
VOMITING: 0

## 2022-03-28 ASSESSMENT — PATIENT HEALTH QUESTIONNAIRE - PHQ9
10. IF YOU CHECKED OFF ANY PROBLEMS, HOW DIFFICULT HAVE THESE PROBLEMS MADE IT FOR YOU TO DO YOUR WORK, TAKE CARE OF THINGS AT HOME, OR GET ALONG WITH OTHER PEOPLE: 0
2. FEELING DOWN, DEPRESSED OR HOPELESS: 0
7. TROUBLE CONCENTRATING ON THINGS, SUCH AS READING THE NEWSPAPER OR WATCHING TELEVISION: 0
8. MOVING OR SPEAKING SO SLOWLY THAT OTHER PEOPLE COULD HAVE NOTICED. OR THE OPPOSITE, BEING SO FIGETY OR RESTLESS THAT YOU HAVE BEEN MOVING AROUND A LOT MORE THAN USUAL: 0
3. TROUBLE FALLING OR STAYING ASLEEP: 0
SUM OF ALL RESPONSES TO PHQ QUESTIONS 1-9: 1
SUM OF ALL RESPONSES TO PHQ9 QUESTIONS 1 & 2: 0
1. LITTLE INTEREST OR PLEASURE IN DOING THINGS: 0
SUM OF ALL RESPONSES TO PHQ QUESTIONS 1-9: 1
SUM OF ALL RESPONSES TO PHQ QUESTIONS 1-9: 1
9. THOUGHTS THAT YOU WOULD BE BETTER OFF DEAD, OR OF HURTING YOURSELF: 0
4. FEELING TIRED OR HAVING LITTLE ENERGY: 1
5. POOR APPETITE OR OVEREATING: 0
SUM OF ALL RESPONSES TO PHQ QUESTIONS 1-9: 1
6. FEELING BAD ABOUT YOURSELF - OR THAT YOU ARE A FAILURE OR HAVE LET YOURSELF OR YOUR FAMILY DOWN: 0

## 2022-03-28 NOTE — PROGRESS NOTES
education: Not on file    Highest education level: Not on file   Occupational History    Not on file   Tobacco Use    Smoking status: Former Smoker     Years: 20.00     Quit date: 2002     Years since quittin.1    Smokeless tobacco: Never Used   Vaping Use    Vaping Use: Never used   Substance and Sexual Activity    Alcohol use: Not Currently     Alcohol/week: 0.0 standard drinks     Comment: occasionally     Drug use: Never    Sexual activity: Not Currently   Other Topics Concern    Not on file   Social History Narrative    Not on file     Social Determinants of Health     Financial Resource Strain:     Difficulty of Paying Living Expenses: Not on file   Food Insecurity:     Worried About [address] in the Last Year: Not on file    Kacie of Food in the Last Year: Not on file   Transportation Needs:     Lack of Transportation (Medical): Not on file    Lack of Transportation (Non-Medical):  Not on file   Physical Activity:     Days of Exercise per Week: Not on file    Minutes of Exercise per Session: Not on file   Stress:     Feeling of Stress : Not on file   Social Connections:     Frequency of Communication with Friends and Family: Not on file    Frequency of Social Gatherings with Friends and Family: Not on file    Attends Worship Services: Not on file    Active Member of [address] [place] [address] [employer] or Organizations: Not on file    Attends Club or Organization Meetings: Not on file    Marital Status: Not on file   Intimate Partner Violence:     Fear of Current or Ex-Partner: Not on file    Emotionally Abused: Not on file    Physically Abused: Not on file    Sexually Abused: Not on file   Housing Stability:     Unable to Pay for Housing in the Last Year: Not on file    Number of [place] in the Last Year: Not on file    Unstable Housing in the Last Year: Not on file       Family History   Problem Relation Age of Onset    Diabetes Maternal Grandmother     Diabetes Other great aunts and uncles    Esophageal Cancer Maternal Grandfather     Lupus Other         maternal great aunt   Gema Hamilton COPD Mother     Alcohol Abuse Mother         recovering    Other Mother         Mac Lung dz    Heart Disease Father        No current outpatient medications on file. No current facility-administered medications for this visit. Immunization History   Administered Date(s) Administered    COVID-19, [place], Primary or Immunocompromised, PF, 100mcg/0.5mL 04/29/2021, 05/27/2021       Allergies   Allergen Reactions    Morphine Other (See Comments)     ARM FELT WARM THEN CHEST FELT HOT. DIDN'T LAST LONG.  Tetracyclines & Related      YEAST INFECTION       Review of Systems   Constitutional: Negative for activity change, appetite change, chills, diaphoresis, fatigue, fever and unexpected weight change. HENT: Positive for nosebleeds and postnasal drip. Respiratory: Negative for shortness of breath. Cardiovascular: Negative for chest pain. Gastrointestinal: Positive for abdominal pain. Negative for abdominal distention, nausea and vomiting. Genitourinary: Negative for decreased urine volume and difficulty urinating. Musculoskeletal: Negative for arthralgias. Skin: Negative for rash. Allergic/Immunologic: Negative for immunocompromised state. Neurological: Negative for dizziness, light-headedness and headaches. /80 (Site: Right Upper Arm, Position: Sitting, Cuff Size: Medium Adult)   Pulse 102   Wt 179 lb 9.6 oz (81.5 kg)   SpO2 97%   BMI 28.13 kg/m²     Physical Exam  Vitals and nursing note reviewed. Constitutional:       General: She is not in acute distress. Appearance: She is well-developed. She is not diaphoretic. HENT:      Head: Normocephalic and atraumatic. Eyes:      Pupils: Pupils are equal, round, and reactive to light. Cardiovascular:      Rate and Rhythm: Normal rate and regular rhythm.    Pulmonary:      Effort: Pulmonary effort is normal. No respiratory distress. Breath sounds: Normal breath sounds. Abdominal:      General: Bowel sounds are normal. There is no distension. Palpations: Abdomen is soft. There is no hepatomegaly, splenomegaly, mass or pulsatile mass. Tenderness: There is abdominal tenderness in the left upper quadrant. There is no guarding or rebound. Negative signs include Rivera's sign. Hernia: There is no hernia in the ventral area. Musculoskeletal:         General: Normal range of motion. Cervical back: Normal range of motion and neck supple. Skin:     General: Skin is warm and dry. Capillary Refill: Capillary refill takes 2 to 3 seconds. Coloration: Skin is not pale. Findings: No erythema or rash. Neurological:      Mental Status: She is alert. Psychiatric:         Behavior: Behavior normal.         Thought Content: Thought content normal.         Judgment: Judgment normal.         Plan  1. LUQ pain  Appears more MSK related based on hx and physical   If XR is normal, consider CT scan if it does not improve with NSAIDs/topical ice   - CBC with Auto Differential; Future  - Comprehensive Metabolic Panel; Future  - Protime-INR; Future  - Lipase; Future  - APTT; Future  - XR RIBS LEFT INCLUDE CHEST (MIN 3 VIEWS); Future  2. Epigastric pain  - CBC with Auto Differential; Future  - Comprehensive Metabolic Panel; Future  - Lipase; Future  3. Intercostal pain  - XR RIBS LEFT INCLUDE CHEST (MIN 3 VIEWS); Future    4. Epistaxis  - CBC with Auto Differential; Future  - TSH with Reflex; Future  - Protime-INR; Future  - APTT; Future    5. Prediabetes  - Hemoglobin A1C; Future        While assessing care for this patient, I have reviewed all pertinent lab work/imaging/ specialist notes and care in reference to those problems addressed above in detail. Appropriate medical decision making was based on this.      Please note that portions of this note may have been completed with a voice

## 2022-03-29 LAB
ESTIMATED AVERAGE GLUCOSE: 122.6 MG/DL
HBA1C MFR BLD: 5.9 %

## 2022-09-19 ENCOUNTER — HOSPITAL ENCOUNTER (OUTPATIENT)
Dept: GENERAL RADIOLOGY | Age: 57
Discharge: HOME | End: 2022-09-19
Payer: COMMERCIAL

## 2022-09-19 ENCOUNTER — HOSPITAL ENCOUNTER (OUTPATIENT)
Age: 57
Discharge: HOME | End: 2022-09-19
Payer: COMMERCIAL

## 2022-09-19 ENCOUNTER — OFFICE VISIT (OUTPATIENT)
Dept: FAMILY MEDICINE CLINIC | Age: 57
End: 2022-09-19
Payer: COMMERCIAL

## 2022-09-19 VITALS
HEART RATE: 85 BPM | HEIGHT: 67 IN | BODY MASS INDEX: 28.06 KG/M2 | WEIGHT: 178.8 LBS | DIASTOLIC BLOOD PRESSURE: 82 MMHG | SYSTOLIC BLOOD PRESSURE: 122 MMHG | OXYGEN SATURATION: 97 %

## 2022-09-19 DIAGNOSIS — M65.311 TRIGGER FINGER OF RIGHT THUMB: ICD-10-CM

## 2022-09-19 DIAGNOSIS — M65.311 TRIGGER FINGER OF RIGHT THUMB: Primary | ICD-10-CM

## 2022-09-19 DIAGNOSIS — R73.02 IGT (IMPAIRED GLUCOSE TOLERANCE): ICD-10-CM

## 2022-09-19 LAB — HBA1C MFR BLD: 5.7 %

## 2022-09-19 PROCEDURE — 99213 OFFICE O/P EST LOW 20 MIN: CPT | Performed by: FAMILY MEDICINE

## 2022-09-19 PROCEDURE — 83036 HEMOGLOBIN GLYCOSYLATED A1C: CPT | Performed by: FAMILY MEDICINE

## 2022-09-19 PROCEDURE — 73140 X-RAY EXAM OF FINGER(S): CPT

## 2022-09-19 ASSESSMENT — ANXIETY QUESTIONNAIRES
5. BEING SO RESTLESS THAT IT IS HARD TO SIT STILL: 2
6. BECOMING EASILY ANNOYED OR IRRITABLE: 0
IF YOU CHECKED OFF ANY PROBLEMS ON THIS QUESTIONNAIRE, HOW DIFFICULT HAVE THESE PROBLEMS MADE IT FOR YOU TO DO YOUR WORK, TAKE CARE OF THINGS AT HOME, OR GET ALONG WITH OTHER PEOPLE: NOT DIFFICULT AT ALL
4. TROUBLE RELAXING: 0
2. NOT BEING ABLE TO STOP OR CONTROL WORRYING: 1
7. FEELING AFRAID AS IF SOMETHING AWFUL MIGHT HAPPEN: 0
1. FEELING NERVOUS, ANXIOUS, OR ON EDGE: 0
3. WORRYING TOO MUCH ABOUT DIFFERENT THINGS: 1
GAD7 TOTAL SCORE: 4

## 2022-09-19 ASSESSMENT — PATIENT HEALTH QUESTIONNAIRE - PHQ9
4. FEELING TIRED OR HAVING LITTLE ENERGY: 1
2. FEELING DOWN, DEPRESSED OR HOPELESS: 0
SUM OF ALL RESPONSES TO PHQ QUESTIONS 1-9: 4
SUM OF ALL RESPONSES TO PHQ QUESTIONS 1-9: 4
10. IF YOU CHECKED OFF ANY PROBLEMS, HOW DIFFICULT HAVE THESE PROBLEMS MADE IT FOR YOU TO DO YOUR WORK, TAKE CARE OF THINGS AT HOME, OR GET ALONG WITH OTHER PEOPLE: 0
SUM OF ALL RESPONSES TO PHQ9 QUESTIONS 1 & 2: 0
3. TROUBLE FALLING OR STAYING ASLEEP: 1
8. MOVING OR SPEAKING SO SLOWLY THAT OTHER PEOPLE COULD HAVE NOTICED. OR THE OPPOSITE, BEING SO FIGETY OR RESTLESS THAT YOU HAVE BEEN MOVING AROUND A LOT MORE THAN USUAL: 0
6. FEELING BAD ABOUT YOURSELF - OR THAT YOU ARE A FAILURE OR HAVE LET YOURSELF OR YOUR FAMILY DOWN: 1
SUM OF ALL RESPONSES TO PHQ QUESTIONS 1-9: 4
5. POOR APPETITE OR OVEREATING: 0
9. THOUGHTS THAT YOU WOULD BE BETTER OFF DEAD, OR OF HURTING YOURSELF: 0
1. LITTLE INTEREST OR PLEASURE IN DOING THINGS: 0
SUM OF ALL RESPONSES TO PHQ QUESTIONS 1-9: 4
7. TROUBLE CONCENTRATING ON THINGS, SUCH AS READING THE NEWSPAPER OR WATCHING TELEVISION: 1

## 2022-09-20 NOTE — PROGRESS NOTES
Zoila Krueger (:  1965) is a 49 y.o. female,Established patient, here for evaluation of the following chief complaint(s):  Hand Pain (Trigger thumb? Been three weeks wants to have it checked out.)         ASSESSMENT/PLAN:  1. Trigger finger of right thumb  -     XR FINGER RIGHT (MIN 2 VIEWS); Future  -     External Referral To Hand Surgery  Will get x-ray to evaluate for OA. 2. IGT (impaired glucose tolerance)  -     POCT glycosylated hemoglobin (Hb A1C)      Return if symptoms worsen or fail to improve. Subjective   SUBJECTIVE/OBJECTIVE:  Chief Complaint   Patient presents with    Hand Pain     Trigger thumb? Been three weeks wants to have it checked out. Hand Pain   The incident occurred more than 1 week ago (right thumb x 3 weeks). The incident occurred at home. The injury mechanism was repetitive motion (using the thumb to scroll on her phone). Quality: catching, then unable to straighten without manually pulling on her thumb. The pain has been Intermittent since the incident. Pertinent negatives include no numbness or tingling. The symptoms are aggravated by movement. Treatments tried: has been bandaging the thumb to limit range of motion. The treatment provided no relief. She is concerned that her prediabetes has worsened into diabetes. She is worried this may be what has caused her thumb problem and would like her hemoglobin A1C checked while she is here today. Notes popping sensation in the thumb with movement. Cannot flex her thumb actively, but is able to do so passively. Review of Systems   Neurological:  Negative for tingling and numbness. Objective   Physical Exam  Right Hand Exam     Tenderness   The patient is experiencing no tenderness.      Range of Motion   Hand   MP Thumb: normal   DIP Thumb: abnormal     Tests   Phalens Sign: negative  Tinel's sign (median nerve): negative  Finkelstein's test: negative    Other   Erythema: absent  Scars: absent  Sensation: normal  Pulse: present    Comments:  Valgus stress negative for laxity      Left Hand Exam     Tests   Phalens Sign: negative            Results for POC orders placed in visit on 09/19/22   POCT glycosylated hemoglobin (Hb A1C)   Result Value Ref Range    Hemoglobin A1C 5.7 %             An electronic signature was used to authenticate this note.     --Cale Hyatt MD

## 2023-02-10 ENCOUNTER — HOSPITAL ENCOUNTER (OUTPATIENT)
Dept: WOMENS IMAGING | Age: 58
Discharge: HOME | End: 2023-02-10
Payer: COMMERCIAL

## 2023-02-10 DIAGNOSIS — Z12.31 ENCOUNTER FOR SCREENING MAMMOGRAM FOR BREAST CANCER: ICD-10-CM

## 2023-02-10 PROCEDURE — 77067 SCR MAMMO BI INCL CAD: CPT

## 2023-03-23 RX ORDER — M-VIT,TX,IRON,MINS/CALC/FOLIC 27MG-0.4MG
1 TABLET ORAL DAILY
COMMUNITY

## 2023-04-04 ENCOUNTER — HOSPITAL ENCOUNTER (OUTPATIENT)
Age: 58
Setting detail: OUTPATIENT SURGERY
Discharge: HOME | End: 2023-04-04
Attending: ORTHOPAEDIC SURGERY | Admitting: ORTHOPAEDIC SURGERY
Payer: COMMERCIAL

## 2023-04-04 VITALS
HEART RATE: 91 BPM | RESPIRATION RATE: 16 BRPM | BODY MASS INDEX: 29.03 KG/M2 | TEMPERATURE: 98 F | SYSTOLIC BLOOD PRESSURE: 147 MMHG | OXYGEN SATURATION: 98 % | HEIGHT: 67 IN | DIASTOLIC BLOOD PRESSURE: 87 MMHG | WEIGHT: 185 LBS

## 2023-04-04 DIAGNOSIS — M65.311 TRIGGER THUMB OF RIGHT HAND: Primary | ICD-10-CM

## 2023-04-04 PROCEDURE — 3600000003 HC SURGERY LEVEL 3 BASE: Performed by: ORTHOPAEDIC SURGERY

## 2023-04-04 PROCEDURE — 7100000010 HC PHASE II RECOVERY - FIRST 15 MIN: Performed by: ORTHOPAEDIC SURGERY

## 2023-04-04 PROCEDURE — 2500000003 HC RX 250 WO HCPCS: Performed by: ORTHOPAEDIC SURGERY

## 2023-04-04 PROCEDURE — 3600000013 HC SURGERY LEVEL 3 ADDTL 15MIN: Performed by: ORTHOPAEDIC SURGERY

## 2023-04-04 PROCEDURE — 2709999900 HC NON-CHARGEABLE SUPPLY: Performed by: ORTHOPAEDIC SURGERY

## 2023-04-04 RX ORDER — LIDOCAINE HYDROCHLORIDE 10 MG/ML
INJECTION, SOLUTION INFILTRATION; PERINEURAL
Status: DISCONTINUED
Start: 2023-04-04 | End: 2023-04-04 | Stop reason: HOSPADM

## 2023-04-04 RX ORDER — HYDROCODONE BITARTRATE AND ACETAMINOPHEN 5; 325 MG/1; MG/1
1 TABLET ORAL EVERY 8 HOURS PRN
Qty: 15 TABLET | Refills: 0 | Status: SHIPPED | OUTPATIENT
Start: 2023-04-04 | End: 2023-04-09

## 2023-04-04 RX ORDER — BUPIVACAINE HYDROCHLORIDE 2.5 MG/ML
INJECTION, SOLUTION EPIDURAL; INFILTRATION; INTRACAUDAL
Status: DISCONTINUED
Start: 2023-04-04 | End: 2023-04-04 | Stop reason: HOSPADM

## 2023-04-04 ASSESSMENT — PAIN DESCRIPTION - ORIENTATION: ORIENTATION: RIGHT

## 2023-04-04 ASSESSMENT — PAIN - FUNCTIONAL ASSESSMENT
PAIN_FUNCTIONAL_ASSESSMENT: 0-10
PAIN_FUNCTIONAL_ASSESSMENT: PREVENTS OR INTERFERES WITH MANY ACTIVE NOT PASSIVE ACTIVITIES

## 2023-04-04 ASSESSMENT — PAIN DESCRIPTION - DESCRIPTORS
DESCRIPTORS: TIGHTNESS
DESCRIPTORS: TINGLING

## 2023-04-04 ASSESSMENT — PAIN DESCRIPTION - LOCATION: LOCATION: FINGER (COMMENT WHICH ONE)

## 2023-04-04 ASSESSMENT — PAIN SCALES - GENERAL: PAINLEVEL_OUTOF10: 1

## 2023-04-04 NOTE — H&P
INR 0.94 03/28/2022 09:30 AM       Radiology:  No orders to display         ASSESSMENT: right trigger thumb    PLAN:  1)  to OR for right trigger thumb release  2)  consented, marked, wants local anesthesia only  3)  risks/benefits reviewed    All questions and concerns were addressed today. Patient is in agreement with the plan. Marlen Headley MD  Hand & Upper Extremity Surgery            Please note that this transcription was created using voice recognition software. Any errors are unintentional and may be due to voice recognition transcription.

## 2023-04-04 NOTE — BRIEF OP NOTE
Brief Postoperative Note      Patient: Sarah Hudson  YOB: 1965  MRN: [id number]    Date of Procedure: 4/4/2023    Pre-Op Diagnosis: Trigger finger of right thumb [M65.311]    Post-Op Diagnosis: Same       Procedure(s):  RIGHT TRIGGER DIGIT THUMB RELEASE    Surgeon(s):  Lola Mahan MD    Assistant:  Surgical Assistant: Lola Mahan    Anesthesia: Local    Estimated Blood Loss (mL): Minimal    Complications: None    Specimens:   * No specimens in log *    Implants:  * No implants in log *      Drains: * No LDAs found *    Findings:     Electronically signed by Lola Mahan MD on 4/4/2023 at 3:16 PM

## 2023-04-04 NOTE — OP NOTE
was deflated and all fingers including the thumb were warm and well perfused. Patient tolerated the case well, was taken to the postanesthesia recovery unit in good condition. No complications. Findings:           Intervention:  1% Xylocaine, 0.25% Marcaine, without epinephrine    Other Notes: Follow-up in 7-10 days for wound inspection and suture removal.  Patient will be taught a range of motion, scar massage, tendon gliding exercises. If there is any concern or desire, hand therapy will be ordered. Progressive activities. Christopher Duran MD  Hand & Upper Extremity Surgery            Please note that this transcription was created using voice recognition software. Any errors are unintentional and may be due to voice recognition transcription.

## 2023-04-04 NOTE — DISCHARGE INSTRUCTIONS
Post op Instructions for Hand Surgery    * Keep dressing dry and clean. It is ok to shower just keep dressing dry. * Elevate operative arm, attempt to be above heart level. * Ice 20 minutes on 20 minutes off, 3 times per day, first 1-2 days postoperatively. * Follow-up appointment as scheduled by office staff. Check paperwork from office. If no appointment scheduled call the office. * If dressing is too tight, you may loosen Ace bandage. * Ok to remove dressing POD#3, then cover incision with bandaid. * Keep sutures dry until follow-up appointment. * Light activities, no heavy lifting. * Finger motion encouraged. * If you have any questions, refer to the office number listed below. [phone] ([facility])   or   [phone])         [address] Kobi Yancey MD  Hand & Upper Extremity Surgery  [facility]    You are under the influence of drugs- do not drink alcohol, drive a car, operate machinery(such as power tools, kitchen appliances, etc), sign legal documents, or make any important decisions for 24 hours (or while on pain medications). Children should not ride bikes or [place] or play on gym sets  for 24 hours after surgery. A responsible adult should be with you for 24 hours. Rest at home today- increase activity as tolerated. Progress slowly to a regular diet unless your physician has instructed you otherwise. Drink plenty of water. CALL YOUR DOCTOR IF YOU:  Have moderate to severe nausea or vomiting AND are unable to hold down fluids or prescribed medications. Have bright red bloody drainage from your dressing that won't stop oozing.   Do not get relief with your pain medication    NORMAL (POSSIBLE) SIDE EFFECTS FROM ANESTHESIA:     Confusion, temporary memory loss, delayed reaction times in the first 24 hours  Lightheadedness, dizziness, difficulty focusing, blurred

## 2023-06-19 SDOH — HEALTH STABILITY: PHYSICAL HEALTH: ON AVERAGE, HOW MANY MINUTES DO YOU ENGAGE IN EXERCISE AT THIS LEVEL?: 30 MIN

## 2023-06-19 SDOH — HEALTH STABILITY: PHYSICAL HEALTH: ON AVERAGE, HOW MANY DAYS PER WEEK DO YOU ENGAGE IN MODERATE TO STRENUOUS EXERCISE (LIKE A BRISK WALK)?: 1 DAY

## 2023-06-19 ASSESSMENT — SOCIAL DETERMINANTS OF HEALTH (SDOH)
WITHIN THE LAST YEAR, HAVE TO BEEN RAPED OR FORCED TO HAVE ANY KIND OF SEXUAL ACTIVITY BY YOUR PARTNER OR EX-PARTNER?: NO
WITHIN THE LAST YEAR, HAVE YOU BEEN KICKED, HIT, SLAPPED, OR OTHERWISE PHYSICALLY HURT BY YOUR PARTNER OR EX-PARTNER?: NO
WITHIN THE LAST YEAR, HAVE YOU BEEN AFRAID OF YOUR PARTNER OR EX-PARTNER?: NO
WITHIN THE LAST YEAR, HAVE YOU BEEN HUMILIATED OR EMOTIONALLY ABUSED IN OTHER WAYS BY YOUR PARTNER OR EX-PARTNER?: NO

## 2023-06-20 ENCOUNTER — OFFICE VISIT (OUTPATIENT)
Dept: FAMILY MEDICINE CLINIC | Age: 58
End: 2023-06-20
Payer: COMMERCIAL

## 2023-06-20 VITALS
OXYGEN SATURATION: 96 % | HEART RATE: 101 BPM | BODY MASS INDEX: 28.91 KG/M2 | HEIGHT: 67 IN | SYSTOLIC BLOOD PRESSURE: 110 MMHG | DIASTOLIC BLOOD PRESSURE: 80 MMHG | WEIGHT: 184.2 LBS

## 2023-06-20 DIAGNOSIS — Z00.00 ANNUAL PHYSICAL EXAM: Primary | ICD-10-CM

## 2023-06-20 DIAGNOSIS — E04.1 THYROID NODULE: ICD-10-CM

## 2023-06-20 DIAGNOSIS — E03.9 ACQUIRED HYPOTHYROIDISM: ICD-10-CM

## 2023-06-20 DIAGNOSIS — Z76.89 ENCOUNTER TO ESTABLISH CARE: ICD-10-CM

## 2023-06-20 DIAGNOSIS — K21.9 GASTROESOPHAGEAL REFLUX DISEASE, UNSPECIFIED WHETHER ESOPHAGITIS PRESENT: ICD-10-CM

## 2023-06-20 DIAGNOSIS — F39 MOOD DISORDER (HCC): ICD-10-CM

## 2023-06-20 DIAGNOSIS — J30.89 NON-SEASONAL ALLERGIC RHINITIS, UNSPECIFIED TRIGGER: ICD-10-CM

## 2023-06-20 DIAGNOSIS — M77.11 LATERAL EPICONDYLITIS OF RIGHT ELBOW: ICD-10-CM

## 2023-06-20 PROCEDURE — 99213 OFFICE O/P EST LOW 20 MIN: CPT | Performed by: STUDENT IN AN ORGANIZED HEALTH CARE EDUCATION/TRAINING PROGRAM

## 2023-06-20 PROCEDURE — 99396 PREV VISIT EST AGE 40-64: CPT | Performed by: STUDENT IN AN ORGANIZED HEALTH CARE EDUCATION/TRAINING PROGRAM

## 2023-06-20 RX ORDER — MELOXICAM 15 MG/1
15 TABLET ORAL DAILY
Qty: 14 TABLET | Refills: 0 | Status: SHIPPED | OUTPATIENT
Start: 2023-06-20

## 2023-06-20 SDOH — ECONOMIC STABILITY: INCOME INSECURITY: HOW HARD IS IT FOR YOU TO PAY FOR THE VERY BASICS LIKE FOOD, HOUSING, MEDICAL CARE, AND HEATING?: NOT HARD AT ALL

## 2023-06-20 SDOH — ECONOMIC STABILITY: FOOD INSECURITY: WITHIN THE PAST 12 MONTHS, THE FOOD YOU BOUGHT JUST DIDN'T LAST AND YOU DIDN'T HAVE MONEY TO GET MORE.: NEVER TRUE

## 2023-06-20 SDOH — ECONOMIC STABILITY: FOOD INSECURITY: WITHIN THE PAST 12 MONTHS, YOU WORRIED THAT YOUR FOOD WOULD RUN OUT BEFORE YOU GOT MONEY TO BUY MORE.: NEVER TRUE

## 2023-06-20 SDOH — ECONOMIC STABILITY: HOUSING INSECURITY
IN THE LAST 12 MONTHS, WAS THERE A TIME WHEN YOU DID NOT HAVE A STEADY PLACE TO SLEEP OR SLEPT IN A SHELTER (INCLUDING NOW)?: NO

## 2023-06-20 ASSESSMENT — PATIENT HEALTH QUESTIONNAIRE - PHQ9
5. POOR APPETITE OR OVEREATING: 0
SUM OF ALL RESPONSES TO PHQ QUESTIONS 1-9: 6
6. FEELING BAD ABOUT YOURSELF - OR THAT YOU ARE A FAILURE OR HAVE LET YOURSELF OR YOUR FAMILY DOWN: 1
SUM OF ALL RESPONSES TO PHQ QUESTIONS 1-9: 6
SUM OF ALL RESPONSES TO PHQ9 QUESTIONS 1 & 2: 3
SUM OF ALL RESPONSES TO PHQ QUESTIONS 1-9: 6
8. MOVING OR SPEAKING SO SLOWLY THAT OTHER PEOPLE COULD HAVE NOTICED. OR THE OPPOSITE, BEING SO FIGETY OR RESTLESS THAT YOU HAVE BEEN MOVING AROUND A LOT MORE THAN USUAL: 0
4. FEELING TIRED OR HAVING LITTLE ENERGY: 1
3. TROUBLE FALLING OR STAYING ASLEEP: 1
1. LITTLE INTEREST OR PLEASURE IN DOING THINGS: 1
2. FEELING DOWN, DEPRESSED OR HOPELESS: 2
SUM OF ALL RESPONSES TO PHQ QUESTIONS 1-9: 6
9. THOUGHTS THAT YOU WOULD BE BETTER OFF DEAD, OR OF HURTING YOURSELF: 0
7. TROUBLE CONCENTRATING ON THINGS, SUCH AS READING THE NEWSPAPER OR WATCHING TELEVISION: 0
10. IF YOU CHECKED OFF ANY PROBLEMS, HOW DIFFICULT HAVE THESE PROBLEMS MADE IT FOR YOU TO DO YOUR WORK, TAKE CARE OF THINGS AT HOME, OR GET ALONG WITH OTHER PEOPLE: 1

## 2023-06-20 ASSESSMENT — ENCOUNTER SYMPTOMS
CHEST TIGHTNESS: 0
PHOTOPHOBIA: 0
WHEEZING: 0
BLOOD IN STOOL: 0
ABDOMINAL DISTENTION: 0
SHORTNESS OF BREATH: 0

## 2023-06-20 NOTE — PATIENT INSTRUCTIONS
[facility] ([facility]) (multiple locations)   [phone]  Psychology    [facility]   [address]   [address] (previously [address])  [address]   [address] [facility], Myrna Elder [address] Professional Service  [address], [facility]. [address] [facility], [address] [facility],[address]  [address] [facility], [address]  [phone]      Psychiatrists    Dr. Lynn Guzman [facility]    [address], [address]   [place], [address] [address] [facility]      Dr. Chaney Favorite   Dr. Tom Lacy, [address], [facility] [address]   [place], [address] [facility]  [address]      [facility]   Dr. Betzy Aguilar [address]   [address] [facility]. [place], [address]   [place], [address] [facility] [address]     [phone]    Dr. Aide Kohler. [place], [address]   [place], [address]  [address]      Dr. Barbara Sanchez  [facility], [address]   [address] [facility], [address]   [place], [address]  [phone]    Dr. Kylie Desouza. [address] [facility].  [address], [address] [facility]  [address] [facility] [address], [address]   [place], [address] [facility]  [phone]    [address] [facility]) [facility] THE [facility]  [address]    [address]  [facility], [address]    [place], [address] [facility],[address]  [phone]    [facility] ([address] [place] [address])  The

## 2023-06-20 NOTE — PROGRESS NOTES
Total Cholesterol: 206 mg/dL        Review of Systems:  Review of Systems   Constitutional:  Negative for fever and unexpected weight change. HENT:  Negative for nosebleeds. Eyes:  Negative for photophobia. Respiratory:  Negative for chest tightness, shortness of breath and wheezing. Cardiovascular:  Negative for chest pain and leg swelling. Gastrointestinal:  Negative for abdominal distention and blood in stool. Genitourinary:  Negative for dysuria. Musculoskeletal:  Negative for gait problem. Neurological:  Negative for seizures and syncope. Psychiatric/Behavioral:  Negative for behavioral problems. Physical Exam:   Vitals:    06/20/23 0828   BP: 110/80   Site: Right Upper Arm   Position: Sitting   Cuff Size: Medium Adult   Pulse: (!) 101   SpO2: 96%   Weight: 184 lb 3.2 oz (83.6 kg)   Height: 5' 7\" (1.702 m)     Body mass index is 28.85 kg/m². Physical Exam  Constitutional:       Appearance: Normal appearance. HENT:      Head: Atraumatic. Right Ear: External ear normal.      Left Ear: External ear normal.      Nose: Nose normal.      Mouth/Throat:      Mouth: Mucous membranes are moist.      Pharynx: Oropharynx is clear. No posterior oropharyngeal erythema. Eyes:      General: No scleral icterus. Extraocular Movements: Extraocular movements intact. Pupils: Pupils are equal, round, and reactive to light. Cardiovascular:      Rate and Rhythm: Normal rate and regular rhythm. Pulses: Normal pulses. Heart sounds: No murmur heard. Pulmonary:      Effort: Pulmonary effort is normal.      Breath sounds: Normal breath sounds. Abdominal:      General: Bowel sounds are normal.      Palpations: Abdomen is soft. Tenderness: There is no abdominal tenderness. Musculoskeletal:         General: Normal range of motion. Cervical back: Normal range of motion and neck supple. Skin:     General: Skin is warm and dry.       Capillary Refill: Capillary refill takes

## 2023-06-21 DIAGNOSIS — Z00.00 ANNUAL PHYSICAL EXAM: ICD-10-CM

## 2023-06-21 DIAGNOSIS — E04.1 THYROID NODULE: ICD-10-CM

## 2023-06-21 DIAGNOSIS — E03.9 ACQUIRED HYPOTHYROIDISM: ICD-10-CM

## 2023-06-21 LAB
DEPRECATED RDW RBC AUTO: 13 % (ref 12.4–15.4)
HCT VFR BLD AUTO: 43.7 % (ref 36–48)
HGB BLD-MCNC: 14.5 G/DL (ref 12–16)
MCH RBC QN AUTO: 30.3 PG (ref 26–34)
MCHC RBC AUTO-ENTMCNC: 33.1 G/DL (ref 31–36)
MCV RBC AUTO: 91.6 FL (ref 80–100)
PLATELET # BLD AUTO: 345 K/UL (ref 135–450)
PMV BLD AUTO: 7.8 FL (ref 5–10.5)
RBC # BLD AUTO: 4.77 M/UL (ref 4–5.2)
TSH SERPL DL<=0.005 MIU/L-ACNC: 3.97 UIU/ML (ref 0.27–4.2)
WBC # BLD AUTO: 9 K/UL (ref 4–11)

## 2023-06-22 LAB
ALBUMIN SERPL-MCNC: 4.8 G/DL (ref 3.4–5)
ALBUMIN/GLOB SERPL: 2.3 {RATIO} (ref 1.1–2.2)
ALP SERPL-CCNC: 74 U/L (ref 40–129)
ALT SERPL-CCNC: 26 U/L (ref 10–40)
ANION GAP SERPL CALCULATED.3IONS-SCNC: 17 MMOL/L (ref 3–16)
AST SERPL-CCNC: 21 U/L (ref 15–37)
BILIRUB SERPL-MCNC: <0.2 MG/DL (ref 0–1)
BUN SERPL-MCNC: 9 MG/DL (ref 7–20)
CALCIUM SERPL-MCNC: 9.8 MG/DL (ref 8.3–10.6)
CHLORIDE SERPL-SCNC: 103 MMOL/L (ref 99–110)
CHOLEST SERPL-MCNC: 221 MG/DL (ref 0–199)
CO2 SERPL-SCNC: 22 MMOL/L (ref 21–32)
CREAT SERPL-MCNC: 0.8 MG/DL (ref 0.6–1.1)
EST. AVERAGE GLUCOSE BLD GHB EST-MCNC: 128.4 MG/DL
GFR SERPLBLD CREATININE-BSD FMLA CKD-EPI: >60 ML/MIN/{1.73_M2}
GLUCOSE SERPL-MCNC: 109 MG/DL (ref 70–99)
HBA1C MFR BLD: 6.1 %
HDLC SERPL-MCNC: 58 MG/DL (ref 40–60)
LDL CHOLESTEROL CALCULATED: 109 MG/DL
POTASSIUM SERPL-SCNC: 4.6 MMOL/L (ref 3.5–5.1)
PROT SERPL-MCNC: 6.9 G/DL (ref 6.4–8.2)
SODIUM SERPL-SCNC: 142 MMOL/L (ref 136–145)
TRIGL SERPL-MCNC: 272 MG/DL (ref 0–150)
VLDLC SERPL CALC-MCNC: 54 MG/DL

## 2023-08-22 ENCOUNTER — OFFICE VISIT (OUTPATIENT)
Dept: PRIMARY CARE CLINIC | Age: 58
End: 2023-08-22
Payer: COMMERCIAL

## 2023-08-22 VITALS
OXYGEN SATURATION: 98 % | WEIGHT: 182 LBS | BODY MASS INDEX: 28.56 KG/M2 | HEART RATE: 97 BPM | DIASTOLIC BLOOD PRESSURE: 78 MMHG | SYSTOLIC BLOOD PRESSURE: 128 MMHG | HEIGHT: 67 IN | TEMPERATURE: 97.9 F

## 2023-08-22 DIAGNOSIS — H65.01 NON-RECURRENT ACUTE SEROUS OTITIS MEDIA OF RIGHT EAR: ICD-10-CM

## 2023-08-22 DIAGNOSIS — F32.81 PMDD (PREMENSTRUAL DYSPHORIC DISORDER): Primary | ICD-10-CM

## 2023-08-22 PROCEDURE — 99214 OFFICE O/P EST MOD 30 MIN: CPT | Performed by: STUDENT IN AN ORGANIZED HEALTH CARE EDUCATION/TRAINING PROGRAM

## 2023-08-22 RX ORDER — METHYLPREDNISOLONE 4 MG/1
TABLET ORAL
Qty: 1 KIT | Refills: 0 | Status: SHIPPED | OUTPATIENT
Start: 2023-08-22 | End: 2023-08-28

## 2023-08-22 ASSESSMENT — PATIENT HEALTH QUESTIONNAIRE - PHQ9
5. POOR APPETITE OR OVEREATING: 0
SUM OF ALL RESPONSES TO PHQ QUESTIONS 1-9: 4
3. TROUBLE FALLING OR STAYING ASLEEP: 0
SUM OF ALL RESPONSES TO PHQ9 QUESTIONS 1 & 2: 2
2. FEELING DOWN, DEPRESSED OR HOPELESS: 1
SUM OF ALL RESPONSES TO PHQ QUESTIONS 1-9: 4
6. FEELING BAD ABOUT YOURSELF - OR THAT YOU ARE A FAILURE OR HAVE LET YOURSELF OR YOUR FAMILY DOWN: 1
8. MOVING OR SPEAKING SO SLOWLY THAT OTHER PEOPLE COULD HAVE NOTICED. OR THE OPPOSITE, BEING SO FIGETY OR RESTLESS THAT YOU HAVE BEEN MOVING AROUND A LOT MORE THAN USUAL: 0
SUM OF ALL RESPONSES TO PHQ QUESTIONS 1-9: 4
9. THOUGHTS THAT YOU WOULD BE BETTER OFF DEAD, OR OF HURTING YOURSELF: 0
7. TROUBLE CONCENTRATING ON THINGS, SUCH AS READING THE NEWSPAPER OR WATCHING TELEVISION: 0
SUM OF ALL RESPONSES TO PHQ QUESTIONS 1-9: 4
4. FEELING TIRED OR HAVING LITTLE ENERGY: 1
1. LITTLE INTEREST OR PLEASURE IN DOING THINGS: 1

## 2023-08-22 ASSESSMENT — ANXIETY QUESTIONNAIRES
5. BEING SO RESTLESS THAT IT IS HARD TO SIT STILL: 0
7. FEELING AFRAID AS IF SOMETHING AWFUL MIGHT HAPPEN: 0
6. BECOMING EASILY ANNOYED OR IRRITABLE: 0
4. TROUBLE RELAXING: 0
3. WORRYING TOO MUCH ABOUT DIFFERENT THINGS: 0
GAD7 TOTAL SCORE: 0
2. NOT BEING ABLE TO STOP OR CONTROL WORRYING: 0
1. FEELING NERVOUS, ANXIOUS, OR ON EDGE: 0

## 2023-08-22 NOTE — PROGRESS NOTES
8/22/2023    This is a 56 y.o. female who presents for  Chief Complaint   Patient presents with    Follow-up     Stuffy and ear pain        HPI:     Left thumb pain  - limited rom   - no improvement with mobic  - other hand had 3 shots which had not helped    Mood  - home is the same, but has not eseen anyone lurking around her house so is not worried   - was having a lot of reactions to people around her house after she had had the home break in      Right ear pain  - feels full  - when she first got it, felt like an itch in her glands in her right ear  - has been taking benadryl at night  - has been taking iburprofen and tylenol              Past Medical History:   Diagnosis Date    Allergic rhinitis     Depression     most of my life    GERD (gastroesophageal reflux disease)     Hearing loss     Nosebleed     Prolonged emergence from general anesthesia     FIRST SURGERY AWOKE VERY EMOTIONAL    Rash     TMJ dysfunction        Past Surgical History:   Procedure Laterality Date    APPENDECTOMY      CHOLECYSTECTOMY, LAPAROSCOPIC N/A 3/18/2021    LAPAROSCOPIC CHOLECYSTECTOMY WITH INTRAOPERATIVE CHOLANGIOGRAM, POSSIBLE OPEN PROCEDURE performed by Debbie Tejeda MD at [facility] N/A 6/4/2019    COLONOSCOPY CONTROL HEMORRHAGE performed by Ramses Young MD at [facility]  6/4/2019    polypectomy per hot snare performed by Ramses Young MD at [address] [facility] Right 4/4/2023    RIGHT TRIGGER DIGIT THUMB RELEASE performed by Faviola Tracy MD at [facility] EG OR    WISDOM TOOTH EXTRACTION         Social History     Socioeconomic History    Marital status:      Spouse name: Not on file    Number of children: 0    Years of education: Not on file    Highest education level: Not on file   Occupational History    Not on file   Tobacco Use    Smoking status: Former     Packs/day: 1.00     Years: 19.00     Pack years: 19.00

## 2023-09-07 ENCOUNTER — OFFICE VISIT (OUTPATIENT)
Dept: PRIMARY CARE CLINIC | Age: 58
End: 2023-09-07

## 2023-09-07 VITALS
OXYGEN SATURATION: 98 % | HEART RATE: 95 BPM | DIASTOLIC BLOOD PRESSURE: 70 MMHG | WEIGHT: 183 LBS | HEIGHT: 67 IN | SYSTOLIC BLOOD PRESSURE: 132 MMHG | BODY MASS INDEX: 28.72 KG/M2 | TEMPERATURE: 98.1 F

## 2023-09-07 DIAGNOSIS — J34.2 DEVIATED NASAL SEPTUM: ICD-10-CM

## 2023-09-07 DIAGNOSIS — H93.8X1 PRESSURE SENSATION IN RIGHT EAR: Primary | ICD-10-CM

## 2023-09-07 DIAGNOSIS — R43.2 LOSS OF TASTE: ICD-10-CM

## 2023-09-07 ASSESSMENT — PATIENT HEALTH QUESTIONNAIRE - PHQ9
7. TROUBLE CONCENTRATING ON THINGS, SUCH AS READING THE NEWSPAPER OR WATCHING TELEVISION: 1
5. POOR APPETITE OR OVEREATING: 1
1. LITTLE INTEREST OR PLEASURE IN DOING THINGS: 1
SUM OF ALL RESPONSES TO PHQ QUESTIONS 1-9: 7
10. IF YOU CHECKED OFF ANY PROBLEMS, HOW DIFFICULT HAVE THESE PROBLEMS MADE IT FOR YOU TO DO YOUR WORK, TAKE CARE OF THINGS AT HOME, OR GET ALONG WITH OTHER PEOPLE: 1
9. THOUGHTS THAT YOU WOULD BE BETTER OFF DEAD, OR OF HURTING YOURSELF: 0
8. MOVING OR SPEAKING SO SLOWLY THAT OTHER PEOPLE COULD HAVE NOTICED. OR THE OPPOSITE, BEING SO FIGETY OR RESTLESS THAT YOU HAVE BEEN MOVING AROUND A LOT MORE THAN USUAL: 0
SUM OF ALL RESPONSES TO PHQ QUESTIONS 1-9: 7
SUM OF ALL RESPONSES TO PHQ QUESTIONS 1-9: 7
6. FEELING BAD ABOUT YOURSELF - OR THAT YOU ARE A FAILURE OR HAVE LET YOURSELF OR YOUR FAMILY DOWN: 1
4. FEELING TIRED OR HAVING LITTLE ENERGY: 1
3. TROUBLE FALLING OR STAYING ASLEEP: 1
SUM OF ALL RESPONSES TO PHQ9 QUESTIONS 1 & 2: 2
SUM OF ALL RESPONSES TO PHQ QUESTIONS 1-9: 7
2. FEELING DOWN, DEPRESSED OR HOPELESS: 1

## 2023-09-07 ASSESSMENT — ANXIETY QUESTIONNAIRES
2. NOT BEING ABLE TO STOP OR CONTROL WORRYING: 0
GAD7 TOTAL SCORE: 3
6. BECOMING EASILY ANNOYED OR IRRITABLE: 3
1. FEELING NERVOUS, ANXIOUS, OR ON EDGE: 0
4. TROUBLE RELAXING: 0
7. FEELING AFRAID AS IF SOMETHING AWFUL MIGHT HAPPEN: 0
3. WORRYING TOO MUCH ABOUT DIFFERENT THINGS: 0
5. BEING SO RESTLESS THAT IT IS HARD TO SIT STILL: 0
IF YOU CHECKED OFF ANY PROBLEMS ON THIS QUESTIONNAIRE, HOW DIFFICULT HAVE THESE PROBLEMS MADE IT FOR YOU TO DO YOUR WORK, TAKE CARE OF THINGS AT HOME, OR GET ALONG WITH OTHER PEOPLE: SOMEWHAT DIFFICULT

## 2023-09-07 NOTE — PATIENT INSTRUCTIONS
- zyrtec over the counter  - get over the counter nasocort 2 puffs in each nostril     -Recommended saline spray over-the-counter 2 puffs in each nostril.  - oRANGE THERAPY as discussed in the office.    Follow up with ENT

## 2023-09-07 NOTE — PROGRESS NOTES
[address] [place] [facility]                                  [address] [place] [address], [address] [facility], [address] [place] [address]         Phone: [phone]    Date of Service:  9/7/2023     Patient ID: Antonino Walls is a 56 y.o. female      Subjective:     CC:   Chief Complaint   Patient presents with    Congestion     Last couple weeks. Nasal congestion possible sinus infection    Oral Swelling     Patient reporting swollen gums and bad taste. Neck Pain     Having neck pressure. Breast Pain     Upper chest pressure and pain more so on the right side. HPI  Agustina Walls 56 y.o.  female with past medical history of allergic rhinitis, deviated nasal septum, nasal valve collapse, hypertrophy of inferior nasal turbinate, TMJ dysfunction, GERD presents to the office with below complaints. Right ear pain  Right sinus pressure   Right neck pressure  - Presented to office on 8/22/2023 complaining of right ear pain suspected to to have acute serous otitis media. At that time, patient was prescribed Medrol Dosepak.  -Patient said that Medrol Dosepak did not help with the symptoms. It caused her to be jittery and shaky.  -Patient continues to have right ear pressure.  -Patient denies any tinnitus, ear discharge, hearing loss, fever, chills.  -Patient did report 1 episode of nosebleed last week that resolved on its own.  -Patient also complaining of right ear pressure radiating to right neck. -Patient reported that she does have history of seasonal allergies, she has allergies to cats, dogs and dust.  -She has previously seen ENT last seen, on 4/26/2021. At that time per ENT note, patient was referred to allergist due to significant allergies and potential immunotherapy shots. Patient was also diagnosed with deviated nasal septum, nasal valve collapse and hypertrophy of inferior nasal turbinate.   Per ENT note, patient is a candidate for

## 2023-09-08 LAB — SARS-COV-2 RNA RESP QL NAA+PROBE: NOT DETECTED

## 2023-09-13 ASSESSMENT — ENCOUNTER SYMPTOMS
EYE PAIN: 0
SHORTNESS OF BREATH: 0
COUGH: 0
RHINORRHEA: 1
NAUSEA: 0
VOMITING: 0

## 2023-09-14 ENCOUNTER — OFFICE VISIT (OUTPATIENT)
Dept: ENT CLINIC | Age: 58
End: 2023-09-14
Payer: COMMERCIAL

## 2023-09-14 VITALS
WEIGHT: 183 LBS | SYSTOLIC BLOOD PRESSURE: 140 MMHG | HEIGHT: 67 IN | TEMPERATURE: 97.3 F | BODY MASS INDEX: 28.72 KG/M2 | RESPIRATION RATE: 16 BRPM | DIASTOLIC BLOOD PRESSURE: 93 MMHG | HEART RATE: 87 BPM

## 2023-09-14 DIAGNOSIS — M95.0 NASAL VALVE COLLAPSE: ICD-10-CM

## 2023-09-14 DIAGNOSIS — J30.2 SEASONAL ALLERGIES: ICD-10-CM

## 2023-09-14 DIAGNOSIS — J01.40 ACUTE NON-RECURRENT PANSINUSITIS: Primary | ICD-10-CM

## 2023-09-14 PROCEDURE — 99214 OFFICE O/P EST MOD 30 MIN: CPT | Performed by: STUDENT IN AN ORGANIZED HEALTH CARE EDUCATION/TRAINING PROGRAM

## 2023-09-14 RX ORDER — AZITHROMYCIN 250 MG/1
250 TABLET, FILM COATED ORAL SEE ADMIN INSTRUCTIONS
Qty: 6 TABLET | Refills: 0 | Status: SHIPPED | OUTPATIENT
Start: 2023-09-14 | End: 2023-09-19

## 2024-01-15 ENCOUNTER — OFFICE VISIT (OUTPATIENT)
Dept: PRIMARY CARE CLINIC | Age: 59
End: 2024-01-15
Payer: COMMERCIAL

## 2024-01-15 VITALS
HEART RATE: 119 BPM | BODY MASS INDEX: 29.19 KG/M2 | OXYGEN SATURATION: 99 % | RESPIRATION RATE: 16 BRPM | HEIGHT: 67 IN | WEIGHT: 186 LBS | SYSTOLIC BLOOD PRESSURE: 129 MMHG | DIASTOLIC BLOOD PRESSURE: 80 MMHG | TEMPERATURE: 98.6 F

## 2024-01-15 DIAGNOSIS — R39.15 URINARY URGENCY: Primary | ICD-10-CM

## 2024-01-15 DIAGNOSIS — R31.9 URINARY TRACT INFECTION WITH HEMATURIA, SITE UNSPECIFIED: ICD-10-CM

## 2024-01-15 DIAGNOSIS — N39.0 URINARY TRACT INFECTION WITH HEMATURIA, SITE UNSPECIFIED: ICD-10-CM

## 2024-01-15 LAB
BILIRUBIN, POC: ABNORMAL
BLOOD URINE, POC: ABNORMAL
CLARITY, POC: CLEAR
COLOR, POC: CLEAR
GLUCOSE URINE, POC: ABNORMAL
KETONES, POC: ABNORMAL
LEUKOCYTE EST, POC: ABNORMAL
NITRITE, POC: ABNORMAL
PH, POC: 6.5
PROTEIN, POC: ABNORMAL
SPECIFIC GRAVITY, POC: 1.02
UROBILINOGEN, POC: 0.2

## 2024-01-15 PROCEDURE — 99213 OFFICE O/P EST LOW 20 MIN: CPT

## 2024-01-15 PROCEDURE — 81002 URINALYSIS NONAUTO W/O SCOPE: CPT

## 2024-01-15 RX ORDER — SULFAMETHOXAZOLE AND TRIMETHOPRIM 800; 160 MG/1; MG/1
1 TABLET ORAL 2 TIMES DAILY
Qty: 10 TABLET | Refills: 0 | Status: SHIPPED | OUTPATIENT
Start: 2024-01-15 | End: 2024-01-20

## 2024-01-15 ASSESSMENT — PATIENT HEALTH QUESTIONNAIRE - PHQ9
SUM OF ALL RESPONSES TO PHQ QUESTIONS 1-9: 1
5. POOR APPETITE OR OVEREATING: 0
9. THOUGHTS THAT YOU WOULD BE BETTER OFF DEAD, OR OF HURTING YOURSELF: 0
4. FEELING TIRED OR HAVING LITTLE ENERGY: SEVERAL DAYS
SUM OF ALL RESPONSES TO PHQ QUESTIONS 1-9: 1
SUM OF ALL RESPONSES TO PHQ QUESTIONS 1-9: 1
1. LITTLE INTEREST OR PLEASURE IN DOING THINGS: NOT AT ALL
SUM OF ALL RESPONSES TO PHQ9 QUESTIONS 1 & 2: 0
10. IF YOU CHECKED OFF ANY PROBLEMS, HOW DIFFICULT HAVE THESE PROBLEMS MADE IT FOR YOU TO DO YOUR WORK, TAKE CARE OF THINGS AT HOME, OR GET ALONG WITH OTHER PEOPLE: NOT DIFFICULT AT ALL
1. LITTLE INTEREST OR PLEASURE IN DOING THINGS: 0
SUM OF ALL RESPONSES TO PHQ QUESTIONS 1-9: 1
9. THOUGHTS THAT YOU WOULD BE BETTER OFF DEAD, OR OF HURTING YOURSELF: NOT AT ALL
3. TROUBLE FALLING OR STAYING ASLEEP: 0
5. POOR APPETITE OR OVEREATING: NOT AT ALL
8. MOVING OR SPEAKING SO SLOWLY THAT OTHER PEOPLE COULD HAVE NOTICED. OR THE OPPOSITE, BEING SO FIGETY OR RESTLESS THAT YOU HAVE BEEN MOVING AROUND A LOT MORE THAN USUAL: 0
3. TROUBLE FALLING OR STAYING ASLEEP: NOT AT ALL
6. FEELING BAD ABOUT YOURSELF - OR THAT YOU ARE A FAILURE OR HAVE LET YOURSELF OR YOUR FAMILY DOWN: NOT AT ALL
4. FEELING TIRED OR HAVING LITTLE ENERGY: 1
2. FEELING DOWN, DEPRESSED OR HOPELESS: 0
6. FEELING BAD ABOUT YOURSELF - OR THAT YOU ARE A FAILURE OR HAVE LET YOURSELF OR YOUR FAMILY DOWN: 0
2. FEELING DOWN, DEPRESSED OR HOPELESS: NOT AT ALL
10. IF YOU CHECKED OFF ANY PROBLEMS, HOW DIFFICULT HAVE THESE PROBLEMS MADE IT FOR YOU TO DO YOUR WORK, TAKE CARE OF THINGS AT HOME, OR GET ALONG WITH OTHER PEOPLE: 0
7. TROUBLE CONCENTRATING ON THINGS, SUCH AS READING THE NEWSPAPER OR WATCHING TELEVISION: NOT AT ALL
SUM OF ALL RESPONSES TO PHQ QUESTIONS 1-9: 1
8. MOVING OR SPEAKING SO SLOWLY THAT OTHER PEOPLE COULD HAVE NOTICED. OR THE OPPOSITE - BEING SO FIDGETY OR RESTLESS THAT YOU HAVE BEEN MOVING AROUND A LOT MORE THAN USUAL: NOT AT ALL
7. TROUBLE CONCENTRATING ON THINGS, SUCH AS READING THE NEWSPAPER OR WATCHING TELEVISION: 0

## 2024-01-15 NOTE — PROGRESS NOTES
PROGRESS NOTE   [facility]                                  [address], [address], [place] [address]         Phone: [phone]    Date of Service:  1/15/2024     Patient ID: Negar Quan is a 58 y.o. female      Subjective:     CC: UTI-like symptoms    HPI  Patient is a 58-year-old female who presents today for UTI-like symptoms.    Patient has had 5 days of UTI-like symptoms.  She states that she continues to have lower belly cramping and a foul odor to her urine.  She states that her urgency and frequency have improved.  She denies any dysuria.  She denies any hematuria or vaginal discharge.  She denies any fever or chills.  She has been doing cranberry juice and water to \"flush\" her kidneys.    ROS:    Negative expect for pertinent positives listed in the HPI        Vitals:    01/15/24 1244   BP: 129/80   Site: Right Upper Arm   Position: Sitting   Cuff Size: Large Adult   Pulse: (!) 119   Resp: 16   Temp: 98.6 °F (37 °C)   TempSrc: Oral   SpO2: 99%   Weight: 84.4 kg (186 lb)   Height: 1.702 m (5' 7\")       Allergies:  Codeine, Morphine, and Tetracyclines & related    Outpatient Medications Marked as Taking for the 1/15/24 encounter (Office Visit) with Zena Huggins, DO   Medication Sig Dispense Refill    Emollient (COLLAGEN EX) Apply topically      Specialty Vitamins Products (MENOPAUSE RELIEF PO) Take by mouth OTC supplement \"Happy hormones\"      Multiple Vitamins-Minerals (THERAPEUTIC MULTIVITAMIN-MINERALS) tablet Take 1 tablet by mouth daily           Objective:     Constitutional:   Reviewed vitals above  Well Nourished, well developed, no distress       HENT:  Normal external nose without lesions  Resp:  Normal effort  Clear to auscultation bilaterally without rhonchi, wheezing or crackles  Cardiovascular:  On auscultation, regular rate and rhythm without murmurs, rubs or gallops  No

## 2024-01-17 LAB
BACTERIA UR CULT: ABNORMAL
ORGANISM: ABNORMAL

## 2024-02-22 ENCOUNTER — OFFICE VISIT (OUTPATIENT)
Dept: PRIMARY CARE CLINIC | Age: 59
End: 2024-02-22
Payer: COMMERCIAL

## 2024-02-22 VITALS
TEMPERATURE: 98.4 F | OXYGEN SATURATION: 98 % | SYSTOLIC BLOOD PRESSURE: 132 MMHG | WEIGHT: 194 LBS | HEIGHT: 67 IN | BODY MASS INDEX: 30.45 KG/M2 | HEART RATE: 100 BPM | DIASTOLIC BLOOD PRESSURE: 82 MMHG

## 2024-02-22 DIAGNOSIS — K21.9 GASTROESOPHAGEAL REFLUX DISEASE, UNSPECIFIED WHETHER ESOPHAGITIS PRESENT: ICD-10-CM

## 2024-02-22 DIAGNOSIS — E03.9 ACQUIRED HYPOTHYROIDISM: ICD-10-CM

## 2024-02-22 DIAGNOSIS — R73.03 PREDIABETES: Primary | ICD-10-CM

## 2024-02-22 DIAGNOSIS — E78.5 HYPERLIPIDEMIA, UNSPECIFIED HYPERLIPIDEMIA TYPE: ICD-10-CM

## 2024-02-22 PROCEDURE — 99214 OFFICE O/P EST MOD 30 MIN: CPT | Performed by: STUDENT IN AN ORGANIZED HEALTH CARE EDUCATION/TRAINING PROGRAM

## 2024-02-22 NOTE — PATIENT INSTRUCTIONS
We are drawing some labs today.  If you have MyChart, you will see the results first, but our office will reach out to you in the next few days to over the results.

## 2024-02-22 NOTE — PROGRESS NOTES
date: 2000     Years since quittin.0    Smokeless tobacco: Never    Tobacco comments:     smoked for 20 years a pack a day.  was around secondhand smoke most of my life   Vaping Use    Vaping Use: Never used   Substance and Sexual Activity    Alcohol use: Not Currently     Comment: occasionally     Drug use: Yes     Types: Marijuana (Weed)    Sexual activity: Not Currently   Other Topics Concern    Not on file   Social History Narrative    Not on file     Social Determinants of Health     Financial Resource Strain: Low Risk  (2023)    Overall Financial Resource Strain (CARDIA)     Difficulty of Paying Living Expenses: Not hard at all   Food Insecurity: Not on file (2023)   Transportation Needs: Unknown (2023)    PRAPARE - Transportation     Lack of Transportation (Medical): Not on file     Lack of Transportation (Non-Medical): No   Physical Activity: Insufficiently Active (2023)    Exercise Vital Sign     Days of Exercise per Week: 1 day     Minutes of Exercise per Session: 30 min   Stress: Not on file   Social Connections: Not on file   Intimate Partner Violence: Not At Risk (2023)    Humiliation, Afraid, Rape, and Kick questionnaire     Fear of Current or Ex-Partner: No     Emotionally Abused: No     Physically Abused: No     Sexually Abused: No   Housing Stability: Unknown (2023)    Housing Stability Vital Sign     Unable to Pay for Housing in the Last Year: Not on file     Number of Places Lived in the Last Year: Not on file     Unstable Housing in the Last Year: No       Family History   Problem Relation Age of Onset    Diabetes Maternal Grandmother     Depression Maternal Grandmother     Stroke Maternal Grandmother     Other Maternal Grandmother     Diabetes Other         great aunts and uncles    Esophageal Cancer Maternal Grandfather     Substance Abuse Maternal Grandfather     Other Maternal Grandfather     Lupus Other         maternal great aunt    COPD Mother

## 2024-02-23 DIAGNOSIS — E78.5 HYPERLIPIDEMIA, UNSPECIFIED HYPERLIPIDEMIA TYPE: ICD-10-CM

## 2024-02-23 DIAGNOSIS — E03.9 ACQUIRED HYPOTHYROIDISM: ICD-10-CM

## 2024-02-23 DIAGNOSIS — R73.03 PREDIABETES: ICD-10-CM

## 2024-02-23 LAB
CHOLEST SERPL-MCNC: 210 MG/DL (ref 0–199)
HDLC SERPL-MCNC: 51 MG/DL (ref 40–60)
LDL CHOLESTEROL CALCULATED: 110 MG/DL
TRIGL SERPL-MCNC: 243 MG/DL (ref 0–150)
TSH SERPL DL<=0.005 MIU/L-ACNC: 3.27 UIU/ML (ref 0.27–4.2)
VLDLC SERPL CALC-MCNC: 49 MG/DL

## 2024-02-24 LAB
EST. AVERAGE GLUCOSE BLD GHB EST-MCNC: 125.5 MG/DL
HBA1C MFR BLD: 6 %

## 2025-03-03 ENCOUNTER — OFFICE VISIT (OUTPATIENT)
Dept: OBGYN CLINIC | Age: 60
End: 2025-03-03
Payer: COMMERCIAL

## 2025-03-03 VITALS
HEART RATE: 98 BPM | BODY MASS INDEX: 30.92 KG/M2 | SYSTOLIC BLOOD PRESSURE: 123 MMHG | HEIGHT: 67 IN | WEIGHT: 197 LBS | DIASTOLIC BLOOD PRESSURE: 87 MMHG | OXYGEN SATURATION: 99 %

## 2025-03-03 DIAGNOSIS — Z01.419 WOMEN'S ANNUAL ROUTINE GYNECOLOGICAL EXAMINATION: Primary | ICD-10-CM

## 2025-03-03 DIAGNOSIS — Z12.11 ENCOUNTER FOR SCREENING COLONOSCOPY: ICD-10-CM

## 2025-03-03 DIAGNOSIS — Z12.4 SCREENING FOR CERVICAL CANCER: ICD-10-CM

## 2025-03-03 DIAGNOSIS — Z12.31 ENCOUNTER FOR SCREENING MAMMOGRAM FOR MALIGNANT NEOPLASM OF BREAST: ICD-10-CM

## 2025-03-03 PROCEDURE — 99459 PELVIC EXAMINATION: CPT | Performed by: OBSTETRICS & GYNECOLOGY

## 2025-03-03 PROCEDURE — 99396 PREV VISIT EST AGE 40-64: CPT | Performed by: OBSTETRICS & GYNECOLOGY

## 2025-03-03 ASSESSMENT — ENCOUNTER SYMPTOMS
BLOOD IN STOOL: 0
BACK PAIN: 0
DIARRHEA: 0
CONSTIPATION: 0

## 2025-03-03 NOTE — PROGRESS NOTES
HENT:      Head: Normocephalic and atraumatic.   Eyes:      General:         Right eye: No discharge.         Left eye: No discharge.      Conjunctiva/sclera: Conjunctivae normal.   Cardiovascular:      Rate and Rhythm: Normal rate and regular rhythm.      Heart sounds: Normal heart sounds.   Pulmonary:      Effort: Pulmonary effort is normal.      Breath sounds: No stridor. No wheezing or rhonchi.   Chest:   Breasts:     Right: No inverted nipple, mass, nipple discharge, skin change or tenderness.      Left: No inverted nipple, mass, nipple discharge, skin change or tenderness.   Abdominal:      General: Abdomen is flat.      Palpations: Abdomen is soft.      Tenderness: There is no abdominal tenderness. There is no guarding or rebound.   Genitourinary:     General: Normal vulva.      Exam position: Lithotomy position.      Labia:         Right: No tenderness or lesion.         Left: No tenderness or lesion.       Vagina: No vaginal discharge, tenderness or bleeding.      Cervix: No discharge, friability or lesion.      Uterus: Normal.       Adnexa:         Right: No mass or tenderness.          Left: No mass or tenderness.     Musculoskeletal:      Cervical back: Normal range of motion and neck supple.   Skin:     General: Skin is warm and dry.   Neurological:      General: No focal deficit present.      Mental Status: She is alert and oriented to person, place, and time.   Psychiatric:         Mood and Affect: Mood normal.         Behavior: Behavior normal.            Assessment/Plan:  59 y.o.  presenting for her annual exam:     Diagnosis Orders   1. Women's annual routine gynecological examination        2. Screening for cervical cancer  PAP SMEAR      3. Encounter for screening mammogram for malignant neoplasm of breast  ANGY DIGITAL SCREEN W OR WO CAD BILATERAL      4. Encounter for screening colonoscopy  Katharina Anne MD, Gastroenterology, [facility]           Annual Visit Recommendations:

## 2025-03-04 LAB
HPV HR 12 DNA SPEC QL NAA+PROBE: NOT DETECTED
HPV16 DNA SPEC QL NAA+PROBE: NOT DETECTED
HPV16+18+H RISK 12 DNA SPEC-IMP: NORMAL
HPV18 DNA SPEC QL NAA+PROBE: NOT DETECTED

## 2025-03-20 ENCOUNTER — HOSPITAL ENCOUNTER (OUTPATIENT)
Dept: WOMENS IMAGING | Age: 60
Discharge: HOME | End: 2025-03-20
Payer: COMMERCIAL

## 2025-03-20 VITALS — BODY MASS INDEX: 30.61 KG/M2 | HEIGHT: 67 IN | WEIGHT: 195 LBS

## 2025-03-20 DIAGNOSIS — Z12.31 ENCOUNTER FOR SCREENING MAMMOGRAM FOR MALIGNANT NEOPLASM OF BREAST: ICD-10-CM

## 2025-03-20 PROCEDURE — 77063 BREAST TOMOSYNTHESIS BI: CPT

## 2025-03-24 ENCOUNTER — RESULTS FOLLOW-UP (OUTPATIENT)
Dept: WOMENS IMAGING | Age: 60
End: 2025-03-24

## 2025-03-24 DIAGNOSIS — R92.8 ABNORMAL MAMMOGRAM: Primary | ICD-10-CM

## 2025-03-28 ENCOUNTER — HOSPITAL ENCOUNTER (OUTPATIENT)
Dept: WOMENS IMAGING | Age: 60
Discharge: HOME | End: 2025-03-28
Payer: COMMERCIAL

## 2025-03-28 ENCOUNTER — TELEPHONE (OUTPATIENT)
Dept: WOMENS IMAGING | Age: 60
End: 2025-03-28

## 2025-03-28 DIAGNOSIS — R92.8 ABNORMAL MAMMOGRAM: Primary | ICD-10-CM

## 2025-03-28 DIAGNOSIS — R92.8 ABNORMAL MAMMOGRAM: ICD-10-CM

## 2025-03-28 PROCEDURE — G0279 TOMOSYNTHESIS, MAMMO: HCPCS

## 2025-03-28 PROCEDURE — 76642 ULTRASOUND BREAST LIMITED: CPT

## 2025-03-28 NOTE — TELEPHONE ENCOUNTER
Dr. Amy Diaz.  Your patient had imaging and was recommended for a Breast MRI to gather further information prior to biopsy.    Please sign the attached/pended order. Click \"Unsigned Order\" at bottom right. The order will open and show order information. Click \"Sign orders\".    Thanks,  ADRIÁN Parish, LAM-ANKUR  Patient Navigator  [facility]  [phone]

## 2025-03-29 ENCOUNTER — RESULTS FOLLOW-UP (OUTPATIENT)
Dept: OBGYN | Age: 60
End: 2025-03-29

## 2025-04-14 ENCOUNTER — HOSPITAL ENCOUNTER (OUTPATIENT)
Dept: MRI IMAGING | Age: 60
Discharge: HOME | End: 2025-04-14
Payer: COMMERCIAL

## 2025-04-14 DIAGNOSIS — R92.8 ABNORMAL MAMMOGRAM: ICD-10-CM

## 2025-04-14 PROCEDURE — A9579 GAD-BASE MR CONTRAST NOS,1ML: HCPCS | Performed by: OBSTETRICS & GYNECOLOGY

## 2025-04-14 PROCEDURE — C8908 MRI W/O FOL W/CONT, BREAST,: HCPCS

## 2025-04-14 PROCEDURE — 6360000004 HC RX CONTRAST MEDICATION: Performed by: OBSTETRICS & GYNECOLOGY

## 2025-04-14 RX ADMIN — GADOTERIDOL 17 ML: 279.3 INJECTION, SOLUTION INTRAVENOUS at 15:00

## 2025-04-15 ENCOUNTER — TELEPHONE (OUTPATIENT)
Dept: WOMENS IMAGING | Age: 60
End: 2025-04-15

## 2025-04-15 ENCOUNTER — RESULTS FOLLOW-UP (OUTPATIENT)
Dept: OBGYN CLINIC | Age: 60
End: 2025-04-15

## 2025-04-15 DIAGNOSIS — R92.8 ABNORMAL MAMMOGRAM: Primary | ICD-10-CM

## 2025-04-15 NOTE — TELEPHONE ENCOUNTER
Discussed Breast MRI results.  Stereotactic biopsy still recommended.    Dr. Ross Diaz.  Your patient had imaging and was recommended for a breast biopsy.  She is aware, educated, and scheduled for 4/25/25.    Please sign the attached/pended order. Click \"Unsigned Order\" at bottom right. The order will open and show order information. Click \"Sign orders\".    Biopsy order pended below for provider signature.      Thanks,  Marge Paez, ADRIÁN, CN-ANKUR  Patient Navigator  [facility]  [phone]         [facility]  [address], [address]  [place], [place] [address]   Phone: [phone]          NAME:  Opal Quan  YOB: 1965  MEDICAL RECORD NUMBER:  [id number]  TODAY'S DATE:  4/15/2025      Referring Physician: Dr. Zena Hawkins    Procedure: Stereotactic Breast Biopsy    Left Breast    Date of biopsy: 4/25/25    Patient taking blood thinners: no    Medicine allergies: no    Special Instructions:     Reviewed pre and post biopsy instructions/information with patient.  Pt verbalized understanding.     Biopsy order form faxed to referring MD.      Stereotactic Biopsy Education     What is it?  Stereotactic breast biopsy is a test that uses imaging, such as  X-ray, to find an area of your breast where a tissue sample will be taken. The sample is viewed  under a microscope to check for signs of breast cancer.     Why is this test done?  This type of breast biopsy is usually done to check for cancer in a lump or microcalcifications found during a mammogram.     How can you prepare for the test?    You may take your regular medications as prescribed.   You may eat and drink before the test.  Take a shower the evening or morning before the biopsy.    What happens before the test?   Mammogram images are taken to find the exact site for the biopsy.  Your skin is washed with an alcohol prep.    You will be given an injection of medication to numb

## 2025-04-25 ENCOUNTER — HOSPITAL ENCOUNTER (OUTPATIENT)
Dept: WOMENS IMAGING | Age: 60
Discharge: HOME | End: 2025-04-25
Payer: COMMERCIAL

## 2025-04-25 DIAGNOSIS — R92.8 ABNORMAL MAMMOGRAM: ICD-10-CM

## 2025-04-25 PROCEDURE — 77065 DX MAMMO INCL CAD UNI: CPT

## 2025-04-25 PROCEDURE — 2720000010 MAM STEREO BREAST BX W LOC DEVICE 1ST LESION LEFT

## 2025-04-25 PROCEDURE — 76098 X-RAY EXAM SURGICAL SPECIMEN: CPT

## 2025-04-25 NOTE — PROGRESS NOTES
Patient in the Breast Center for a breast biopsy. Radiologist reviewed procedure with patient, consent signed. Patient tolerated procedure well. Compression held. Site cleansed with chloraprep. Liquid Band-Aid and a dry dressing applied. Ice packs provided. Reviewed discharge instructions with patient and instructions given to patient.  Patient verbalized understanding and agreed to contact the Breast Navigator with any questions. Patient was A&Ox3 and steady on feet and discharged to waiting area.      The [facility]   Discharge Instructions  [facility]  [address]  [address]  Telephone: [phone]   FAX [phone]    NAME:  Opal Quan  YOB: 1965  GENDER: female  MEDICAL RECORD NUMBER:  [id number]  TODAY'S DATE:  4/25/2025    Discharge Instructions Breast Center:    Post Breast Biopsy Instructions     [x] You may remove your outer dressing in 24 hours and you may shower. The surgical glue will fall off after 7 days. Do not pick, scratch, or rub the film.     [x] Place a cold pack inside your bra on top of the dressing for at least 3 hours, removing it every 15 minutes for 15 minutes after your biopsy.     [x] Wear a firm fitting bra for at least 24 hours after your biopsy, including while you sleep.    [x] Place an ice pack inside your bra on top of the dressing for at least 3 hours, removing it every 15 minutes for 15 minutes after your biopsy.    [x] You may resume your held medications tomorrow, unless otherwise directed by your physician.    [x] Your physician has instructed you to take Tylenol (Acetaminophen) the day of your biopsy for any discomfort.    [x] Watch for excessive bleeding. If bleeding occurs apply pressure to site. Watch for signs of infection, increased pain, redness, swelling and heat.  If this occurs call your physician.     [x] Do not participate in any strenuous exercise for 48 hours after your biopsy and do not lift, pull

## 2025-04-28 ENCOUNTER — TELEPHONE (OUTPATIENT)
Dept: WOMENS IMAGING | Age: 60
End: 2025-04-28

## 2025-04-28 NOTE — TELEPHONE ENCOUNTER
Pathology results are complete from breast biopsy. The radiologist has confirmed concordance.     Breast Navigator reviewed results of breast biopsy with patient. Results are negative for any malignancy on the pathology report.  The radiologist is recommending that the patient return for a left breast mammogram in 6 months. Patient verbalized understanding.  Path report faxed to referring physician.     Junie Munroe RN

## 2025-05-12 NOTE — PROGRESS NOTES
Opal A Sandman    Age 59 y.o.    female    1965    MRN [id number]    5/20/2025  Arrival Time_____________  OR Time____________30 Min     Procedure(s):  COLONOSCOPY                      Monitor Anesthesia Care    Surgeon(s):  Katharina Pond, MD       Phone [phone] (home)     Initals  Date  Info Source  Home  Cell         Work  _____________________________________________________________________  _____________________________________________________________________  _____________________________________________________________________  _____________________________________________________________________  _____________________________________________________________________    PCP _____________________________ Phone_________________     H&P  ________________  Bringing      Chart              Epic      DOS      Called________  EKG ________________   Bringing      Chart              Epic      DOS      Called________  LABS________________   Bringing     Chart              Epic      DOS      Called________  Cardiac Clearance ______ Bringing      Chart              Epic      DOS      Called________  Pulmonary Clearance____ Bringing      Chart              Epic      DOS      Called________    Cardiologist________________________ Phone___________________________  Pulmonologist_______________________Phone___________________________    ? Advance Directives   ? Judaism concerns / Waiver on Chart            PAT Communications________________  ? Pre-op Instructions Given /Understood          _________________________________  ? Directions to Surgery Center                          _________________________________  ? Transportation Home_______________      __________________________________  ? Crutches/Walker__________________        __________________________________    Orders: Hard copy/ EPIC                 Transcribed/ EPIC              _______Wt.    ________Pharmacy                         _______SCD

## 2025-05-15 NOTE — PROGRESS NOTES
Date and time of surgery : 5/20/25 at 0800             Arrival Time: 0700      Bring Picture ID and insurance card.  Please wear simple, loose fitting clothing to the hospital.   Do not bring valuables (money, credit cards, checkbooks, etc.)   Do not wear any makeup (including  eye makeup) and no nail polish or artificial nails on your fingers or toes.  DO NOT wear any jewelry or piercings on day of surgery.  All body piercing jewelry must be removed.  If you have dentures, they will be removed before going to the OR; we will provide you a container.  If you wear contact lenses or glasses, they will be removed; please bring a case for them.  Shower the evening before or morning of surgery with antibacterial soap.  Nothing to eat or drink after 300 am the morning of your procedure  You may brush your teeth and gargle the morning of surgery.  DO NOT SWALLOW WATER.   Do not take any morning meds the day of your surgery.  Aspirin, Ibuprofen, Advil, Naproxen, Vitamin E and other Anti-inflammatory products and supplements should be stopped for 5 -7days before surgery or as directed by your physician.  Do not smoke or drink any alcoholic beverages 24 hours prior to surgery.  This includes NA Beer. Refrain from the usage of any recreational drugs, including non-prescribed prescription drugs.   You MUST plan for a responsible adult to stay on site while you are here and take you home after your surgery. You will not be allowed to leave alone or drive yourself home. It is strongly suggested someone stay with you the first 24 hrs. Your surgery will be cancelled if you do not have a ride home.  To help prevent infection, change your sheets the night before surgery.   If you  have a Living Will and Durable Power of  for Healthcare, please bring in a copy.  Notify your Surgeon if you develop any illness between now and time of surgery. Cough, cold, fever, sore throat, nausea, vomiting, etc.  Please notify your surgeon

## 2025-05-19 ENCOUNTER — ANESTHESIA EVENT (OUTPATIENT)
Dept: ENDOSCOPY | Age: 60
End: 2025-05-19
Payer: COMMERCIAL

## 2025-05-20 ENCOUNTER — HOSPITAL ENCOUNTER (OUTPATIENT)
Age: 60
Setting detail: OUTPATIENT SURGERY
Discharge: HOME | End: 2025-05-20
Attending: INTERNAL MEDICINE | Admitting: INTERNAL MEDICINE
Payer: COMMERCIAL

## 2025-05-20 ENCOUNTER — ANESTHESIA (OUTPATIENT)
Dept: ENDOSCOPY | Age: 60
End: 2025-05-20
Payer: COMMERCIAL

## 2025-05-20 VITALS
RESPIRATION RATE: 16 BRPM | HEIGHT: 67 IN | HEART RATE: 99 BPM | WEIGHT: 190 LBS | TEMPERATURE: 98.2 F | DIASTOLIC BLOOD PRESSURE: 86 MMHG | OXYGEN SATURATION: 99 % | SYSTOLIC BLOOD PRESSURE: 155 MMHG | BODY MASS INDEX: 29.82 KG/M2

## 2025-05-20 DIAGNOSIS — Z12.11 COLON CANCER SCREENING: ICD-10-CM

## 2025-05-20 DIAGNOSIS — Z86.0100 HISTORY OF COLON POLYPS: ICD-10-CM

## 2025-05-20 PROCEDURE — C1889 IMPLANT/INSERT DEVICE, NOC: HCPCS | Performed by: INTERNAL MEDICINE

## 2025-05-20 PROCEDURE — 7100000010 HC PHASE II RECOVERY - FIRST 15 MIN: Performed by: INTERNAL MEDICINE

## 2025-05-20 PROCEDURE — 3609010600 HC COLONOSCOPY POLYPECTOMY SNARE/COLD BIOPSY: Performed by: INTERNAL MEDICINE

## 2025-05-20 PROCEDURE — 7100000011 HC PHASE II RECOVERY - ADDTL 15 MIN: Performed by: INTERNAL MEDICINE

## 2025-05-20 PROCEDURE — 2580000003 HC RX 258: Performed by: NURSE ANESTHETIST, CERTIFIED REGISTERED

## 2025-05-20 PROCEDURE — 88305 TISSUE EXAM BY PATHOLOGIST: CPT

## 2025-05-20 PROCEDURE — 3700000001 HC ADD 15 MINUTES (ANESTHESIA): Performed by: INTERNAL MEDICINE

## 2025-05-20 PROCEDURE — 3700000000 HC ANESTHESIA ATTENDED CARE: Performed by: INTERNAL MEDICINE

## 2025-05-20 PROCEDURE — 3609009900 HC COLONOSCOPY W/CONTROL BLEEDING ANY METHOD: Performed by: INTERNAL MEDICINE

## 2025-05-20 PROCEDURE — 2580000003 HC RX 258: Performed by: ANESTHESIOLOGY

## 2025-05-20 PROCEDURE — 6360000002 HC RX W HCPCS: Performed by: NURSE ANESTHETIST, CERTIFIED REGISTERED

## 2025-05-20 PROCEDURE — 2709999900 HC NON-CHARGEABLE SUPPLY: Performed by: INTERNAL MEDICINE

## 2025-05-20 DEVICE — CLIP
Type: IMPLANTABLE DEVICE | Site: SIGMOID COLON | Status: FUNCTIONAL
Brand: RESOLUTION 360™ ULTRA CLIP

## 2025-05-20 RX ORDER — SODIUM CHLORIDE 9 MG/ML
INJECTION, SOLUTION INTRAVENOUS PRN
Status: CANCELLED | OUTPATIENT
Start: 2025-05-20

## 2025-05-20 RX ORDER — ONDANSETRON 2 MG/ML
4 INJECTION INTRAMUSCULAR; INTRAVENOUS
Status: CANCELLED | OUTPATIENT
Start: 2025-05-20

## 2025-05-20 RX ORDER — NALOXONE HYDROCHLORIDE 0.4 MG/ML
INJECTION, SOLUTION INTRAMUSCULAR; INTRAVENOUS; SUBCUTANEOUS PRN
Status: CANCELLED | OUTPATIENT
Start: 2025-05-20

## 2025-05-20 RX ORDER — SODIUM CHLORIDE, SODIUM LACTATE, POTASSIUM CHLORIDE, CALCIUM CHLORIDE 600; 310; 30; 20 MG/100ML; MG/100ML; MG/100ML; MG/100ML
INJECTION, SOLUTION INTRAVENOUS CONTINUOUS
Status: DISCONTINUED | OUTPATIENT
Start: 2025-05-20 | End: 2025-05-20 | Stop reason: HOSPADM

## 2025-05-20 RX ORDER — SODIUM CHLORIDE 0.9 % (FLUSH) 0.9 %
5-40 SYRINGE (ML) INJECTION PRN
Status: DISCONTINUED | OUTPATIENT
Start: 2025-05-20 | End: 2025-05-20 | Stop reason: HOSPADM

## 2025-05-20 RX ORDER — OXYCODONE HYDROCHLORIDE 5 MG/1
5 TABLET ORAL PRN
Refills: 0 | Status: CANCELLED | OUTPATIENT
Start: 2025-05-20 | End: 2025-05-20

## 2025-05-20 RX ORDER — LIDOCAINE HYDROCHLORIDE 10 MG/ML
1 INJECTION, SOLUTION EPIDURAL; INFILTRATION; INTRACAUDAL; PERINEURAL
Status: DISCONTINUED | OUTPATIENT
Start: 2025-05-20 | End: 2025-05-20 | Stop reason: HOSPADM

## 2025-05-20 RX ORDER — DIPHENHYDRAMINE HYDROCHLORIDE 50 MG/ML
12.5 INJECTION, SOLUTION INTRAMUSCULAR; INTRAVENOUS
Status: CANCELLED | OUTPATIENT
Start: 2025-05-20

## 2025-05-20 RX ORDER — OXYCODONE HYDROCHLORIDE 5 MG/1
10 TABLET ORAL PRN
Refills: 0 | Status: CANCELLED | OUTPATIENT
Start: 2025-05-20 | End: 2025-05-20

## 2025-05-20 RX ORDER — SODIUM CHLORIDE 0.9 % (FLUSH) 0.9 %
5-40 SYRINGE (ML) INJECTION EVERY 12 HOURS SCHEDULED
Status: DISCONTINUED | OUTPATIENT
Start: 2025-05-20 | End: 2025-05-20 | Stop reason: HOSPADM

## 2025-05-20 RX ORDER — PROPOFOL 10 MG/ML
INJECTION, EMULSION INTRAVENOUS
Status: DISCONTINUED | OUTPATIENT
Start: 2025-05-20 | End: 2025-05-20 | Stop reason: SDUPTHER

## 2025-05-20 RX ORDER — SODIUM CHLORIDE 0.9 % (FLUSH) 0.9 %
5-40 SYRINGE (ML) INJECTION EVERY 12 HOURS SCHEDULED
Status: CANCELLED | OUTPATIENT
Start: 2025-05-20

## 2025-05-20 RX ORDER — SODIUM CHLORIDE, SODIUM LACTATE, POTASSIUM CHLORIDE, CALCIUM CHLORIDE 600; 310; 30; 20 MG/100ML; MG/100ML; MG/100ML; MG/100ML
INJECTION, SOLUTION INTRAVENOUS
Status: DISCONTINUED | OUTPATIENT
Start: 2025-05-20 | End: 2025-05-20 | Stop reason: SDUPTHER

## 2025-05-20 RX ORDER — MEPERIDINE HYDROCHLORIDE 50 MG/ML
12.5 INJECTION INTRAMUSCULAR; INTRAVENOUS; SUBCUTANEOUS EVERY 5 MIN PRN
Refills: 0 | Status: CANCELLED | OUTPATIENT
Start: 2025-05-20

## 2025-05-20 RX ORDER — LIDOCAINE HYDROCHLORIDE 20 MG/ML
INJECTION, SOLUTION INFILTRATION; PERINEURAL
Status: DISCONTINUED | OUTPATIENT
Start: 2025-05-20 | End: 2025-05-20 | Stop reason: SDUPTHER

## 2025-05-20 RX ORDER — SODIUM CHLORIDE 0.9 % (FLUSH) 0.9 %
5-40 SYRINGE (ML) INJECTION PRN
Status: CANCELLED | OUTPATIENT
Start: 2025-05-20

## 2025-05-20 RX ORDER — LABETALOL HYDROCHLORIDE 5 MG/ML
5 INJECTION, SOLUTION INTRAVENOUS EVERY 10 MIN PRN
Status: CANCELLED | OUTPATIENT
Start: 2025-05-20

## 2025-05-20 RX ORDER — SODIUM CHLORIDE 9 MG/ML
INJECTION, SOLUTION INTRAVENOUS PRN
Status: DISCONTINUED | OUTPATIENT
Start: 2025-05-20 | End: 2025-05-20 | Stop reason: HOSPADM

## 2025-05-20 RX ORDER — MIDAZOLAM HYDROCHLORIDE 1 MG/ML
2 INJECTION, SOLUTION INTRAMUSCULAR; INTRAVENOUS
Status: DISCONTINUED | OUTPATIENT
Start: 2025-05-20 | End: 2025-05-20 | Stop reason: HOSPADM

## 2025-05-20 RX ADMIN — PROPOFOL 50 MG: 10 INJECTION, EMULSION INTRAVENOUS at 08:18

## 2025-05-20 RX ADMIN — PROPOFOL 50 MG: 10 INJECTION, EMULSION INTRAVENOUS at 08:23

## 2025-05-20 RX ADMIN — LIDOCAINE HYDROCHLORIDE 100 MG: 20 INJECTION, SOLUTION INFILTRATION; PERINEURAL at 08:08

## 2025-05-20 RX ADMIN — PROPOFOL 50 MG: 10 INJECTION, EMULSION INTRAVENOUS at 08:10

## 2025-05-20 RX ADMIN — SODIUM CHLORIDE, SODIUM LACTATE, POTASSIUM CHLORIDE, AND CALCIUM CHLORIDE: .6; .31; .03; .02 INJECTION, SOLUTION INTRAVENOUS at 07:22

## 2025-05-20 RX ADMIN — SODIUM CHLORIDE, SODIUM LACTATE, POTASSIUM CHLORIDE, AND CALCIUM CHLORIDE: .6; .31; .03; .02 INJECTION, SOLUTION INTRAVENOUS at 08:04

## 2025-05-20 RX ADMIN — PROPOFOL 50 MG: 10 INJECTION, EMULSION INTRAVENOUS at 08:11

## 2025-05-20 RX ADMIN — PROPOFOL 100 MG: 10 INJECTION, EMULSION INTRAVENOUS at 08:08

## 2025-05-20 ASSESSMENT — PAIN - FUNCTIONAL ASSESSMENT
PAIN_FUNCTIONAL_ASSESSMENT: 0-10

## 2025-05-20 NOTE — H&P
Gastroenterology Note             Pre-operative History and Physical    Patient: Opal Quan  : 1965  CSN:     History Obtained From:  patient and/or guardian.     HISTORY OF PRESENT ILLNESS:    The patient is a 59 y.o. female  here for colonoscopy.   Past Medical History:    Past Medical History:   Diagnosis Date    Allergic rhinitis     Depression     most of my life    GERD (gastroesophageal reflux disease)     Hearing loss     Nosebleed     Prolonged emergence from general anesthesia     AWaKE VERY EMOTIONAL and gasping     Past Surgical History:    Past Surgical History:   Procedure Laterality Date    APPENDECTOMY      CHOLECYSTECTOMY, LAPAROSCOPIC N/A 3/18/2021    LAPAROSCOPIC CHOLECYSTECTOMY WITH INTRAOPERATIVE CHOLANGIOGRAM, POSSIBLE OPEN PROCEDURE performed by Leopoldo Cain MD at [facility] OR    COLONOSCOPY N/A 2019    COLONOSCOPY CONTROL HEMORRHAGE performed by Gregorio Lott MD at [facility] ENDOSCOPY    COLONOSCOPY  2019    polypectomy per hot snare performed by Gregorio Lott MD at [facility] ENDOSCOPY    FINGER TRIGGER RELEASE Right 2023    RIGHT TRIGGER DIGIT THUMB RELEASE performed by Zacarias Spann MD at [facility] EG OR    [facility] STEREO BREAST BX W LOC DEVICE 1ST LESION LEFT Left 2025    [facility] STEREO BREAST BX W LOC DEVICE 1ST LESION LEFT 2025 [facility]    WISDOM TOOTH EXTRACTION       Medications Prior to Admission:   No current facility-administered medications on file prior to encounter.     Current Outpatient Medications on File Prior to Encounter   Medication Sig Dispense Refill    IBUPROFEN PO Take by mouth as needed      Fluticasone Propionate (FLONASE NA) by Nasal route as needed      diphenhydrAMINE HCl (BENADRYL PO) Take by mouth as needed      Emollient (COLLAGEN EX) Apply topically as needed      Multiple Vitamins-Minerals (THERAPEUTIC MULTIVITAMIN-MINERALS) tablet Take 1 tablet by mouth daily          Allergies:

## 2025-05-20 NOTE — ANESTHESIA PRE PROCEDURE
Department of Anesthesiology  Preprocedure Note       Name:  Opal Quan   Age:  59 y.o.  :  1965                                          MRN:  [id number]         Date:  2025      Surgeon: Surgeon(s):  Katharina Pond MD    Procedure: Procedure(s):  COLONOSCOPY    Medications prior to admission:   Prior to Admission medications    Medication Sig Start Date End Date Taking? Authorizing Provider   IBUPROFEN PO Take by mouth as needed   Yes Iman Yadav MD   Fluticasone Propionate (FLONASE NA) by Nasal route as needed   Yes ProviderIman MD   diphenhydrAMINE HCl (BENADRYL PO) Take by mouth as needed   Yes ProviderIman MD   Emollient (COLLAGEN EX) Apply topically as needed   Yes ProviderIman MD   Multiple Vitamins-Minerals (THERAPEUTIC MULTIVITAMIN-MINERALS) tablet Take 1 tablet by mouth daily   Yes ProviderIman MD       Current medications:    Current Facility-Administered Medications   Medication Dose Route Frequency Provider Last Rate Last Admin    lidocaine PF 1 % injection 1 mL  1 mL IntraDERmal Once PRN Roland Monson MD        lactated ringers infusion   IntraVENous Continuous Roland Monson  mL/hr at 25 0722 New Bag at 25 0722    sodium chloride flush 0.9 % injection 5-40 mL  5-40 mL IntraVENous 2 times per day Roland Monson MD        sodium chloride flush 0.9 % injection 5-40 mL  5-40 mL IntraVENous PRN Roland Monson MD        0.9 % sodium chloride infusion   IntraVENous PRN Roland Monson MD        midazolam (VERSED) injection 2 mg  2 mg IntraVENous Once PRN Roland Monson MD           Allergies:    Allergies   Allergen Reactions    Adhesive Tape     Codeine Other (See Comments)     2019 -    2019 -        Morphine Other (See Comments)     ARM FELT WARM THEN CHEST FELT HOT. DIDN'T LAST LONG.    Tetracyclines & Related      YEAST INFECTION

## 2025-05-20 NOTE — ANESTHESIA POSTPROCEDURE EVALUATION
Department of Anesthesiology  Postprocedure Note    Patient: Opal Quan  MRN: [id number]  YOB: 1965  Date of evaluation: 5/20/2025    Procedure Summary       Date: 05/20/25 Room / Location: [place] [address] / [facility]    Anesthesia Start: 0804 Anesthesia Stop: 0834    Procedures:       COLONOSCOPY POLYPECTOMY SNARE/BIOPSY      COLONOSCOPY CONTROL HEMORRHAGE Diagnosis:       Colon cancer screening      History of colon polyps      (Colon cancer screening [Z12.11])      (History of colon polyps [Z86.0100])    Surgeons: Katharina Pond MD Responsible Provider: Susie Minaya MD    Anesthesia Type: TIVA ASA Status: 2            Anesthesia Type: No value filed.    Jerry Phase I: Jerry Score: 10    Jerry Phase II: Jerry Score: 10    Anesthesia Post Evaluation    Comments: Postoperative Anesthesia Note    Name:    Opal Quan  MRN:      [id number]    Patient Vitals in the past 12 hrs:  05/20/25 0847, BP:(!) 155/86, Pulse:99, Resp:16, SpO2:99 %  05/20/25 0832, BP:(!) 132/94, Pulse:(!) 110, Resp:16, SpO2:96 %  05/20/25 0719, BP:(!) 141/98, Temp:98.2 °F (36.8 °C), Temp src:Oral, Pulse:(!) 115, Resp:16, SpO2:93 %     LABS:    CBC  Lab Results       Component                Value               Date/Time                  WBC                      9.0                 06/21/2023 08:40 AM        HGB                      14.5                06/21/2023 08:40 AM        HCT                      43.7                06/21/2023 08:40 AM        PLT                      345                 06/21/2023 08:40 AM   RENAL  Lab Results       Component                Value               Date/Time                  NA                       142                 06/21/2023 08:40 AM        K                        4.6                 06/21/2023 08:40 AM        CL                       103                 06/21/2023 08:40 AM        CO2                      22                  06/21/2023 08:40 AM        BUN

## 2025-05-20 NOTE — PROGRESS NOTES
Pt arrives in PACU resting quietly.  Resp easy and regular.  VS stable.  Pt awake and talking with the staff.  LR infusing 600  cc LTC.  Report from Andres AKBAR from Endo.

## 2025-05-20 NOTE — DISCHARGE INSTRUCTIONS
2 weeks.   2.Sometimes you receive an appropriate antibiotic within 60 minutes before your surgery or take one for several days after surgery depending on your surgeon's instructions and/or the type of surgery you are having.    3. Family and/or friends who visit you should NOT touch the surgical wound or dressings until advised by your surgeon.    4. Be sure to elevate and decrease the swelling after your surgery to help prevent infection.   5. If you are a diabetic, you need to closely monitor your blood sugar levels and report any significant increases or changes to your surgeon to help promote the healing process.

## 2025-05-27 ENCOUNTER — RESULTS FOLLOW-UP (OUTPATIENT)
Dept: GASTROENTEROLOGY | Age: 60
End: 2025-05-27

## 2025-09-03 ASSESSMENT — ENCOUNTER SYMPTOMS
COUGH: 0
SHORTNESS OF BREATH: 0
EYE PAIN: 0
RHINORRHEA: 1
VOMITING: 0
NAUSEA: 0

## 2025-09-04 ENCOUNTER — OFFICE VISIT (OUTPATIENT)
Dept: ENT CLINIC | Age: 60
End: 2025-09-04
Payer: COMMERCIAL

## 2025-09-04 VITALS
DIASTOLIC BLOOD PRESSURE: 86 MMHG | BODY MASS INDEX: 30.76 KG/M2 | WEIGHT: 196 LBS | SYSTOLIC BLOOD PRESSURE: 135 MMHG | HEIGHT: 67 IN | HEART RATE: 105 BPM

## 2025-09-04 DIAGNOSIS — J01.40 ACUTE NON-RECURRENT PANSINUSITIS: Primary | ICD-10-CM

## 2025-09-04 DIAGNOSIS — J30.2 SEASONAL ALLERGIES: ICD-10-CM

## 2025-09-04 PROCEDURE — 99214 OFFICE O/P EST MOD 30 MIN: CPT | Performed by: STUDENT IN AN ORGANIZED HEALTH CARE EDUCATION/TRAINING PROGRAM

## 2025-09-04 PROCEDURE — 31231 NASAL ENDOSCOPY DX: CPT | Performed by: STUDENT IN AN ORGANIZED HEALTH CARE EDUCATION/TRAINING PROGRAM

## 2025-09-04 RX ORDER — METHYLPREDNISOLONE 4 MG/1
TABLET ORAL
Qty: 1 KIT | Refills: 0 | Status: SHIPPED | OUTPATIENT
Start: 2025-09-04 | End: 2025-09-10

## (undated) DEVICE — PUMP SUC IRR TBNG L10FT W/ HNDPC ASSEMB STRYKEFLOW 2

## (undated) DEVICE — AIRLIFE™ NASAL OXYGEN CANNULA CURVED, FLARED TIP, WITH 7 FEET (2.1 M) CRUSH RESISTANT TUBING, OVER-THE-EAR STYLE: Brand: AIRLIFE™

## (undated) DEVICE — SUTURE ETHLN SZ 4-0 L18IN NONABSORBABLE BLK L19MM PS-2 3/8 1667H

## (undated) DEVICE — TROCAR: Brand: KII SLEEVE

## (undated) DEVICE — SINGLE USE LIGATING DEVICE: Brand: SINGLE USE LIGATING DEVICE

## (undated) DEVICE — SYRINGE MED 30ML STD CLR PLAS LUERLOCK TIP N CTRL DISP

## (undated) DEVICE — DRAPE C ARM W46XL120IN XLN

## (undated) DEVICE — SYRINGE MED 10ML LUERLOCK TIP W/O SFTY DISP

## (undated) DEVICE — ELECTRODE,ECG,STRESS,FOAM,3PK: Brand: MEDLINE

## (undated) DEVICE — NEEDLE HYPO 22GA L1 1/2IN PIVOTING SHLD FOR LUERLOCK SYR

## (undated) DEVICE — TROCAR: Brand: KII FIOS FIRST ENTRY

## (undated) DEVICE — FORCEPS BX L240CM JAW DIA2.8MM L CAP W/ NDL MIC MESH TOOTH

## (undated) DEVICE — GLOVE,SURG,SENSICARE SLT,LF,PF,7.5: Brand: MEDLINE

## (undated) DEVICE — SYRINGE, LUER LOCK, 60ML: Brand: MEDLINE

## (undated) DEVICE — BLADE OPHTH 180DEG CUT SURF BLU STR SHRP DBL BVL GRINDLESS

## (undated) DEVICE — NEEDLE 25GAX5.0MM INJ CARR LOCKE

## (undated) DEVICE — ENDOSCOPIC KIT 2 12 FT OP4 DE2 GWN SYR

## (undated) DEVICE — ELECTRODE PT RET AD L9FT HI MOIST COND ADH HYDRGEL CORDED

## (undated) DEVICE — Z CONVERTED USE 2273163 BANDAGE COMPR W3INXL4.5YD LTWT E EC SGL LAYERED CLP CLSR

## (undated) DEVICE — TRAP SPEC POLYPR SGL CHMBR FN MESH SCRN

## (undated) DEVICE — Z DISCONTINUED USE 2276105 GOWN PROTCT UNIV CHST W28IN L49IN SL 24IN BLU SPUNBOND FLM

## (undated) DEVICE — NEEDLE HYPO 18GA L1.5IN THN WALL PIVOTING SHLD BVL ORIENTED

## (undated) DEVICE — Z INACTIVE USE 2641839 CLIP INT M L POLYMER LOK LIG HEM O LOK

## (undated) DEVICE — [HIGH FLOW INSUFFLATOR,  DO NOT USE IF PACKAGE IS DAMAGED,  KEEP DRY,  KEEP AWAY FROM SUNLIGHT,  PROTECT FROM HEAT AND RADIOACTIVE SOURCES.]: Brand: PNEUMOSURE

## (undated) DEVICE — SNARE ENDOSCP POLYP 2.4 MM 240 CM 10 MM 2.8 MM CAPTIVATOR

## (undated) DEVICE — COTTON UNDERCAST PADDING,CRIMPED FINISH: Brand: WEBRIL

## (undated) DEVICE — STERILE LATEX POWDER-FREE SURGICAL GLOVESWITH NITRILE COATING: Brand: PROTEXIS

## (undated) DEVICE — CANNULA NSL AD TBNG L7FT PVC STR NONFLARED PRNG O2 DEL W STD

## (undated) DEVICE — TISSUE RETRIEVAL SYSTEM: Brand: INZII RETRIEVAL SYSTEM

## (undated) DEVICE — SKIN AFFIX SURG ADHESIVE 72/CS 0.55ML: Brand: MEDLINE

## (undated) DEVICE — FORCEPS BX L240CM DIA2.4MM L NDL RAD JAW 4 133340

## (undated) DEVICE — GLOVE SURG SZ 7 L12IN FNGR THK94MIL STD WHT ISOLEX LTX FREE

## (undated) DEVICE — Device

## (undated) DEVICE — INDICATED FOR USE DURING OPEN AND LAPAROSCOPIC CHOLECYSTECTOMY PROCEDURES TO INJECT RADIOPAQUE MEDIA THROUGH THE CYSTIC DUCT INTO THE BILIARY TREE.: Brand: AEROSTAT®